# Patient Record
Sex: FEMALE | Race: BLACK OR AFRICAN AMERICAN | NOT HISPANIC OR LATINO | Employment: OTHER | URBAN - METROPOLITAN AREA
[De-identification: names, ages, dates, MRNs, and addresses within clinical notes are randomized per-mention and may not be internally consistent; named-entity substitution may affect disease eponyms.]

---

## 2017-05-18 PROBLEM — M81.0 OSTEOPOROSIS: Status: ACTIVE | Noted: 2017-05-18

## 2017-10-31 PROBLEM — M85.89 OSTEOPENIA OF MULTIPLE SITES: Status: ACTIVE | Noted: 2017-05-18

## 2018-01-08 ENCOUNTER — TELEPHONE (OUTPATIENT)
Dept: ADMINISTRATIVE | Facility: HOSPITAL | Age: 78
End: 2018-01-08

## 2018-03-14 PROBLEM — M05.749 RHEUMATOID ARTHRITIS INVOLVING HAND WITH POSITIVE RHEUMATOID FACTOR: Status: ACTIVE | Noted: 2018-03-14

## 2018-03-14 PROBLEM — M81.8 OTHER OSTEOPOROSIS WITHOUT CURRENT PATHOLOGICAL FRACTURE: Status: ACTIVE | Noted: 2018-03-14

## 2018-03-14 PROBLEM — Z78.9 INTOLERANCE OF ORAL BISPHOSPHONATE THERAPY: Status: ACTIVE | Noted: 2018-03-14

## 2019-01-10 ENCOUNTER — TELEPHONE (OUTPATIENT)
Dept: ADMINISTRATIVE | Facility: HOSPITAL | Age: 79
End: 2019-01-10

## 2019-01-31 PROBLEM — R00.0 TACHYCARDIA: Status: ACTIVE | Noted: 2019-01-31

## 2019-01-31 PROBLEM — I48.3 TYPICAL ATRIAL FLUTTER: Status: ACTIVE | Noted: 2019-01-31

## 2019-03-08 PROBLEM — I48.91 ATRIAL FIBRILLATION WITH RVR: Status: ACTIVE | Noted: 2019-03-08

## 2019-03-08 PROBLEM — I48.92 ATRIAL FLUTTER, PAROXYSMAL: Status: ACTIVE | Noted: 2019-03-08

## 2019-03-08 PROBLEM — I50.9 CHF EXACERBATION: Status: ACTIVE | Noted: 2019-03-08

## 2020-01-07 PROBLEM — D64.9 SYMPTOMATIC ANEMIA: Status: ACTIVE | Noted: 2020-01-07

## 2020-01-07 PROBLEM — I50.30 (HFPEF) HEART FAILURE WITH PRESERVED EJECTION FRACTION: Status: ACTIVE | Noted: 2020-01-07

## 2020-01-08 PROBLEM — D64.9 SYMPTOMATIC ANEMIA: Status: RESOLVED | Noted: 2020-01-07 | Resolved: 2020-01-08

## 2020-01-08 PROBLEM — D64.9 SEVERE ANEMIA: Status: ACTIVE | Noted: 2020-01-08

## 2020-01-09 PROBLEM — D64.9 SYMPTOMATIC ANEMIA: Status: RESOLVED | Noted: 2020-01-07 | Resolved: 2020-01-09

## 2020-01-13 ENCOUNTER — PATIENT OUTREACH (OUTPATIENT)
Dept: ADMINISTRATIVE | Facility: CLINIC | Age: 80
End: 2020-01-13

## 2020-01-13 NOTE — PATIENT INSTRUCTIONS

## 2020-01-13 NOTE — PROGRESS NOTES
C3 nurse attempted to contact patient. No answer. The following message was left for the patient to return the call:  Good morning  I am a nurse calling on behalf of [facility] from the [facility].  This is a Transitional Care Call for Eneida. When you have a moment please contact us at [phone] or [phone] Monday through Friday, between the hours of 8 am to 4 pm. We look forward to speaking with you. On behalf of [facility] have a nice day.    The patient has a scheduled [facility] appointment with Paula Cooper NP on 1/22/20 @ 1300. Message sent to Physician staff.

## 2020-02-26 ENCOUNTER — OFFICE VISIT (OUTPATIENT)
Dept: URGENT CARE | Facility: CLINIC | Age: 80
End: 2020-02-26
Payer: MEDICARE

## 2020-02-26 VITALS
TEMPERATURE: 96 F | HEIGHT: 59 IN | SYSTOLIC BLOOD PRESSURE: 114 MMHG | RESPIRATION RATE: 20 BRPM | OXYGEN SATURATION: 98 % | DIASTOLIC BLOOD PRESSURE: 63 MMHG | BODY MASS INDEX: 24.8 KG/M2 | HEART RATE: 63 BPM | WEIGHT: 123 LBS

## 2020-02-26 DIAGNOSIS — B96.89 ACUTE BACTERIAL SINUSITIS: Primary | ICD-10-CM

## 2020-02-26 DIAGNOSIS — R05.9 COUGH: ICD-10-CM

## 2020-02-26 DIAGNOSIS — J01.90 ACUTE BACTERIAL SINUSITIS: Primary | ICD-10-CM

## 2020-02-26 PROCEDURE — 71046 X-RAY EXAM CHEST 2 VIEWS: CPT | Mod: S$GLB,,, | Performed by: RADIOLOGY

## 2020-02-26 PROCEDURE — 99214 OFFICE O/P EST MOD 30 MIN: CPT | Mod: S$GLB,,, | Performed by: NURSE PRACTITIONER

## 2020-02-26 PROCEDURE — 99214 PR OFFICE/OUTPT VISIT, EST, LEVL IV, 30-39 MIN: ICD-10-PCS | Mod: S$GLB,,, | Performed by: NURSE PRACTITIONER

## 2020-02-26 PROCEDURE — 71046 XR CHEST PA AND LATERAL: ICD-10-PCS | Mod: S$GLB,,, | Performed by: RADIOLOGY

## 2020-02-26 RX ORDER — AMOXICILLIN AND CLAVULANATE POTASSIUM 875; 125 MG/1; MG/1
1 TABLET, FILM COATED ORAL 2 TIMES DAILY
Qty: 20 TABLET | Refills: 0 | Status: SHIPPED | OUTPATIENT
Start: 2020-02-26 | End: 2020-03-07

## 2020-02-26 NOTE — PATIENT INSTRUCTIONS
· Follow up with Dr Cooper in 3-5 days for recheck.  ·   · Seek emergency care any shortness of breath, difficulty breathing, weakness, chest pain or any other concerning or worsening symptoms immediately.  ·   · Continue flonase and montelukast as prescribed per Dr Cooper as well for allergic rhinitis symptoms.    Elevated Blood Pressure  Your blood pressure was elevated during your visit to the urgent care today.  It was not so high that immediate care was needed, but it is recommended that you monitor your blood pressure over the next week or two to make sure that it is not staying elevated.  If you are on blood pressure medication currently, continue as already prescribed. Please have your blood pressure taken 2-3 times daily at different times of the day.  Keep a log of these blood pressure readings and take it with you to see your Primary Care Physician.  Bring today's discharge papers as well to your follow up appointment. If your blood pressure is consistently above 140/90, you should follow-up with your PCP without delay. If you develop chest pain, shortness of breath, dizziness, vision changes, or any other concerning symptoms, you should seek immediate care in the Emergency Department.        1.  Take all antibiotics as prescribed.  It is imperative that once you start them, you take them to completion unless otherwise directed.  You should not have left over antibiotics.     2.  For patients above 6 months of age who are not allergic to and are not on anticoagulants, you can alternate Tylenol and Motrin every 4-6 hours for fever above 100.4F and/or pain.  For patients less than 6 months of age, allergic to or intolerant to NSAIDS, have gastritis, gastric ulcers, or history of GI bleeds, are pregnant, or are on anticoagulant therapy, you can take Tylenol every 4 hours as needed for fever above 100.4F and/or pain.     3.  Rest and keep yourself well hydrated.  Drink hot liquids (coffee, water, tea, hot  chocolate, or soup) 10-12 times a day for 5-7 days.  Put liquid in a mug and place in microwave for 2.5 - 3 minutes. Pour hot liquid into another mug not used to microwave the liquid (to avoid burning your mouth) then sniff the steam from the cup and sip the heated liquid.    4.  You can use these over the counter medications/remedies to help with your symptoms:     Runny Nose:  Use an antihistamine such as Claritin, Zyrtec or Allegra to help dry you out.     Congestion:  Coricidin HBP is okay to use if you have high blood pressure.     Use mucinex (guaifenisin) up to 1200mg/day to break up/loosen any mucous. MucinexDM has a cough suppressant that can be used for cough and at night to stop the tickle in the back of your throat.    Use Nasal Saline to mechanically move any post nasal drip from your eustachian tubes or from the back of your throat.      Use Flonase 1-2 sprays/nostril per day. It is a local acting steroid nasal spray.  If you develop a bloody nose, stop using the medication immediately.    Sore throat:  Use warm, salt water gargles to ease your throat pain- 1/2 tsp salt to 1 cup warm water, gargle as desired.  Chloraseptic sprays and throat lozenges will also help to ease throat pain.           These things will help you to feel better and will speed your recovery.  If your condition fails to improve in a timely manner, you should receive another evaluation by your Primary Care Provider/Pediatrician to discuss your concerns or return to urgent care for a recheck.  If your condition worsens at any time, you should report immediately to your nearest Emergency Department for further evaluation. **You must understand that you have received Urgent Care treatment only and that you may be released before all of your medical problems are known or treated. You, the patient, are responsible to arrange for follow-up care as instructed.     ·   ·   ·   · Follow up with your primary care in 2-5 days if symptoms  have not improved, or you may return here.  · If you were referred to a specialist, please follow up with that specialty.  · If you were prescribed antibiotics, please take them to completion.  · If you were prescribed a narcotic or any medication with sedative effects, do not drive or operate heavy equipment or machinery while taking these medications.  · You must understand that you have received treatment at an Urgent Care facility only, and that you may be released before all of your medical problems are known or treated. Urgent Care facilities are not equipped to handle life threatening emergencies. It is recommended that you go to an Emergency Department for further evaluation of worsening or concerning symptoms, or possibly life threatening conditions as discussed.                                        If you  smoke, please stop smoking

## 2020-02-26 NOTE — PROGRESS NOTES
"Subjective:       Patient ID: Eneida Majano is a 79 y.o. female.    Vitals:  height is 4' 11" (1.499 m) and weight is 55.8 kg (123 lb). Her tympanic temperature is 96 °F (35.6 °C). Her blood pressure is 114/63 and her pulse is 63. Her respiration is 20 and oxygen saturation is 98%.     Chief Complaint: Cough    Pt saw pcp earlier this month for cough and congestion. No relief with flonase, singulair she states "said it was allergies". Pt is notably sneezing at present.     Cough   This is a recurrent problem. The current episode started more than 1 month ago (2 months). The problem has been waxing and waning. The problem occurs constantly. The cough is productive of sputum (clear congestion). Associated symptoms include nasal congestion, postnasal drip and a sore throat. Pertinent negatives include no chest pain, chills, ear congestion, ear pain, fever, headaches, myalgias, rash, shortness of breath or wheezing. Nothing aggravates the symptoms. She has tried OTC cough suppressant (flonase, montelukast) for the symptoms. The treatment provided no relief. There is no history of asthma, bronchitis or COPD.       Constitution: Negative for chills, fatigue and fever.   HENT: Positive for congestion (green), postnasal drip, sinus pressure and sore throat. Negative for ear pain, ear discharge, nosebleeds, trouble swallowing and voice change.    Neck: Negative for neck pain, neck stiffness and painful lymph nodes.   Cardiovascular: Negative for chest pain, leg swelling and sob on exertion.   Eyes: Negative for eye discharge, eye itching, double vision and blurred vision.   Respiratory: Positive for cough and sputum production (clear). Negative for chest tightness, COPD, shortness of breath, wheezing and asthma.    Gastrointestinal: Negative for abdominal pain, nausea, vomiting and diarrhea.   Genitourinary: Negative for dysuria, frequency, urgency, urine decreased and history of kidney stones.   Musculoskeletal: " Negative for joint pain, joint swelling, muscle cramps and muscle ache.   Skin: Negative for color change, pale and rash.   Allergic/Immunologic: Negative for seasonal allergies and asthma.   Neurological: Negative for dizziness, history of vertigo, light-headedness, passing out and headaches.   Hematologic/Lymphatic: Negative for swollen lymph nodes and easy bruising/bleeding. Does not bruise/bleed easily.   Psychiatric/Behavioral: Negative for nervous/anxious, sleep disturbance and depression. The patient is not nervous/anxious.        Objective:      Physical Exam   Constitutional: She is oriented to person, place, and time. She appears well-developed and well-nourished. She is cooperative.  Non-toxic appearance. She does not have a sickly appearance. She does not appear ill. No distress.   HENT:   Head: Normocephalic and atraumatic.   Right Ear: Hearing, tympanic membrane, external ear and ear canal normal. No mastoid tenderness. Tympanic membrane is not erythematous. No middle ear effusion.   Left Ear: Hearing, tympanic membrane, external ear and ear canal normal. No mastoid tenderness. Tympanic membrane is not erythematous.  No middle ear effusion.   Nose: Mucosal edema and purulent discharge present. No rhinorrhea or nasal deformity. No epistaxis. Right sinus exhibits maxillary sinus tenderness. Right sinus exhibits no frontal sinus tenderness. Left sinus exhibits maxillary sinus tenderness. Left sinus exhibits no frontal sinus tenderness.   Mouth/Throat: Uvula is midline and mucous membranes are normal. Mucous membranes are not pale. No trismus in the jaw. Normal dentition. No uvula swelling. Posterior oropharyngeal erythema (mild with purulent post nasal drainage) present. No oropharyngeal exudate, posterior oropharyngeal edema or tonsillar abscesses. Tonsils are 0 on the right. Tonsils are 0 on the left. No tonsillar exudate.   Airway patent, normal phonation. Green streaked congestion on kleenex.    Eyes:  Conjunctivae and lids are normal. Right eye exhibits no discharge. Left eye exhibits no discharge. No scleral icterus.   Neck: Trachea normal, normal range of motion, full passive range of motion without pain and phonation normal. Neck supple. No neck rigidity. No tracheal deviation, no edema and no erythema present.   Cardiovascular: Normal rate, regular rhythm, normal heart sounds, intact distal pulses and normal pulses. PMI is not displaced.   No murmur heard.  Pulmonary/Chest: Effort normal and breath sounds normal. No accessory muscle usage or stridor. No tachypnea and no bradypnea. No respiratory distress. She has no decreased breath sounds. She has no wheezes. She has no rhonchi.   Normal RR and RA sats. Speaking in full sentences without difficulty.    Abdominal: Normal appearance.   Musculoskeletal: Normal range of motion. She exhibits no edema or deformity.   Lymphadenopathy:     She has no cervical adenopathy.   Neurological: She is alert and oriented to person, place, and time. She exhibits normal muscle tone. Coordination normal.   Skin: Skin is warm, dry, intact, not diaphoretic and not pale.   Psychiatric: She has a normal mood and affect. Her speech is normal and behavior is normal. Judgment and thought content normal. Cognition and memory are normal.   Nursing note and vitals reviewed.        Assessment:       1. Acute bacterial sinusitis    2. Cough        Plan:     Alert, nontoxic and in NAD. Afebrile. Pt with sinusitis on exam, she is repeatedly sneezing as well and dose have an allergic rhinitis component to this.  Lungs cta with e/u respirations, no wheezing or seth/sob or evidence respiratory distress. Given age and pmh with length of reported cough, will do chest xray. No acute findings on cxr of decompensation or pneumonia. Will treat for sinusitis and to continue allergic rhinitis as already prescribed by pcp and follow up with pcp in 3-4 days for recheck. Advised on s/s to seek emergency  care, return to clinic. BP elevation, recheck 114/63 when returned for chest xray to clinic.     Acute bacterial sinusitis  -     amoxicillin-clavulanate 875-125mg (AUGMENTIN) 875-125 mg per tablet; Take 1 tablet by mouth 2 (two) times daily. for 10 days  Dispense: 20 tablet; Refill: 0    Cough  -     XR CHEST PA AND LATERAL; Future; Expected date: 02/26/2020        Xr Chest Pa And Lateral    Result Date: 2/26/2020  EXAMINATION: XR CHEST PA AND LATERAL CLINICAL HISTORY: Cough TECHNIQUE: PA and lateral views of the chest were performed. COMPARISON: 01/07/2020.  03/08/2019. FINDINGS: Mediastinal structures are midline. Cardiac silhouette is stable with evidence of left atrial enlargement.  Pulmonary vessels appear large in their central portions with peripheral pruning concerning for pulmonary artery hypertension in this 79-year-old woman. Lungs are deeply and symmetrically inflated without convincing evidence of overinflation.  I detect no pulmonary disease, pleural fluid, lymph node enlargement, cardiac decompensation, pneumothorax, pneumomediastinum, pneumoperitoneum or significant osseous abnormality.     No pneumonia or other source of cough identified. The patient has known left atrial enlargement without cardiac decompensation. I can not exclude pulmonary arterial hypertension. Electronically signed by: Kate Ritter MD Date:    02/26/2020 Time:    15:38        Patient Instructions   · Follow up with Dr Cooper in 3-5 days for recheck.  ·   · Seek emergency care any shortness of breath, difficulty breathing, weakness, chest pain or any other concerning or worsening symptoms immediately.  ·   · Continue flonase and montelukast as prescribed per Dr Cooper as well for allergic rhinitis symptoms.    Elevated Blood Pressure  Your blood pressure was elevated during your visit to the urgent care today.  It was not so high that immediate care was needed, but it is recommended that you monitor your blood pressure over  the next week or two to make sure that it is not staying elevated.  If you are on blood pressure medication currently, continue as already prescribed. Please have your blood pressure taken 2-3 times daily at different times of the day.  Keep a log of these blood pressure readings and take it with you to see your Primary Care Physician.  Bring today's discharge papers as well to your follow up appointment. If your blood pressure is consistently above 140/90, you should follow-up with your PCP without delay. If you develop chest pain, shortness of breath, dizziness, vision changes, or any other concerning symptoms, you should seek immediate care in the Emergency Department.        1.  Take all antibiotics as prescribed.  It is imperative that once you start them, you take them to completion unless otherwise directed.  You should not have left over antibiotics.     2.  For patients above 6 months of age who are not allergic to and are not on anticoagulants, you can alternate Tylenol and Motrin every 4-6 hours for fever above 100.4F and/or pain.  For patients less than 6 months of age, allergic to or intolerant to NSAIDS, have gastritis, gastric ulcers, or history of GI bleeds, are pregnant, or are on anticoagulant therapy, you can take Tylenol every 4 hours as needed for fever above 100.4F and/or pain.     3.  Rest and keep yourself well hydrated.  Drink hot liquids (coffee, water, tea, hot chocolate, or soup) 10-12 times a day for 5-7 days.  Put liquid in a mug and place in microwave for 2.5 - 3 minutes. Pour hot liquid into another mug not used to microwave the liquid (to avoid burning your mouth) then sniff the steam from the cup and sip the heated liquid.    4.  You can use these over the counter medications/remedies to help with your symptoms:     Runny Nose:  Use an antihistamine such as Claritin, Zyrtec or Allegra to help dry you out.     Congestion:  Coricidin HBP is okay to use if you have high blood pressure.      Use mucinex (guaifenisin) up to 1200mg/day to break up/loosen any mucous. MucinexDM has a cough suppressant that can be used for cough and at night to stop the tickle in the back of your throat.    Use Nasal Saline to mechanically move any post nasal drip from your eustachian tubes or from the back of your throat.      Use Flonase 1-2 sprays/nostril per day. It is a local acting steroid nasal spray.  If you develop a bloody nose, stop using the medication immediately.    Sore throat:  Use warm, salt water gargles to ease your throat pain- 1/2 tsp salt to 1 cup warm water, gargle as desired.  Chloraseptic sprays and throat lozenges will also help to ease throat pain.           These things will help you to feel better and will speed your recovery.  If your condition fails to improve in a timely manner, you should receive another evaluation by your Primary Care Provider/Pediatrician to discuss your concerns or return to urgent care for a recheck.  If your condition worsens at any time, you should report immediately to your nearest Emergency Department for further evaluation. **You must understand that you have received Urgent Care treatment only and that you may be released before all of your medical problems are known or treated. You, the patient, are responsible to arrange for follow-up care as instructed.     ·   ·   ·   · Follow up with your primary care in 2-5 days if symptoms have not improved, or you may return here.  · If you were referred to a specialist, please follow up with that specialty.  · If you were prescribed antibiotics, please take them to completion.  · If you were prescribed a narcotic or any medication with sedative effects, do not drive or operate heavy equipment or machinery while taking these medications.  · You must understand that you have received treatment at an Urgent Care facility only, and that you may be released before all of your medical problems are known or treated. Urgent  Care facilities are not equipped to handle life threatening emergencies. It is recommended that you go to an Emergency Department for further evaluation of worsening or concerning symptoms, or possibly life threatening conditions as discussed.                                        If you  smoke, please stop smoking

## 2020-04-06 PROBLEM — R05.3 CHRONIC COUGH: Status: ACTIVE | Noted: 2020-04-06

## 2020-04-06 PROBLEM — J30.2 SEASONAL ALLERGIES: Status: ACTIVE | Noted: 2020-04-06

## 2020-05-22 PROBLEM — R19.5 OCCULT BLOOD POSITIVE STOOL: Status: ACTIVE | Noted: 2020-05-22

## 2020-05-22 PROBLEM — D64.9 SYMPTOMATIC ANEMIA: Status: ACTIVE | Noted: 2020-05-22

## 2020-05-23 PROBLEM — D64.9 SYMPTOMATIC ANEMIA: Status: RESOLVED | Noted: 2020-05-22 | Resolved: 2020-05-23

## 2020-05-23 PROBLEM — R19.5 OCCULT BLOOD POSITIVE STOOL: Status: RESOLVED | Noted: 2020-05-22 | Resolved: 2020-05-23

## 2020-05-28 PROBLEM — D64.9 ANEMIA: Status: ACTIVE | Noted: 2020-05-28

## 2020-06-10 ENCOUNTER — NURSE TRIAGE (OUTPATIENT)
Dept: ADMINISTRATIVE | Facility: CLINIC | Age: 80
End: 2020-06-10

## 2020-06-11 NOTE — TELEPHONE ENCOUNTER
Pt contacted regarding day 14 of [facility].  Pt denies cough, fever or difficulty breathing since procedure.  Instructed pt to call back with further questions or concerns, pt verbalized understanding.    Reason for Disposition   General information question, no triage required and triager able to answer question    Additional Information   Negative: [1] Caller is not with the adult (patient) AND [2] reporting urgent symptoms   Negative: Lab result questions   Negative: Medication questions   Negative: Caller can't be reached by phone   Negative: Caller has already spoken to PCP or another triager   Negative: RN needs further essential information from caller in order to complete triage   Negative: Requesting regular office appointment   Negative: [1] Caller requesting NON-URGENT health information AND [2] PCP's office is the best resource    Protocols used: INFORMATION ONLY CALL-A-
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
## 2020-06-16 PROBLEM — R13.10 DYSPHAGIA: Status: ACTIVE | Noted: 2020-06-16

## 2020-06-16 PROBLEM — T17.320A CHOKING DUE TO FOOD IN LARYNX: Status: ACTIVE | Noted: 2020-06-16

## 2020-07-21 ENCOUNTER — NURSE TRIAGE (OUTPATIENT)
Dept: ADMINISTRATIVE | Facility: CLINIC | Age: 80
End: 2020-07-21

## 2021-03-03 ENCOUNTER — OFFICE VISIT (OUTPATIENT)
Dept: URGENT CARE | Facility: CLINIC | Age: 81
End: 2021-03-03
Payer: MEDICARE

## 2021-03-03 VITALS
WEIGHT: 111 LBS | SYSTOLIC BLOOD PRESSURE: 148 MMHG | BODY MASS INDEX: 22.38 KG/M2 | RESPIRATION RATE: 18 BRPM | OXYGEN SATURATION: 98 % | DIASTOLIC BLOOD PRESSURE: 71 MMHG | HEART RATE: 63 BPM | TEMPERATURE: 98 F | HEIGHT: 59 IN

## 2021-03-03 DIAGNOSIS — R21 RASH: Primary | ICD-10-CM

## 2021-03-03 PROCEDURE — 99213 OFFICE O/P EST LOW 20 MIN: CPT | Mod: S$GLB,,, | Performed by: NURSE PRACTITIONER

## 2021-03-03 PROCEDURE — 3072F PR LOW RISK FOR RETINOPATHY: ICD-10-PCS | Mod: S$GLB,,, | Performed by: NURSE PRACTITIONER

## 2021-03-03 PROCEDURE — 3072F LOW RISK FOR RETINOPATHY: CPT | Mod: S$GLB,,, | Performed by: NURSE PRACTITIONER

## 2021-03-03 PROCEDURE — 99213 PR OFFICE/OUTPT VISIT, EST, LEVL III, 20-29 MIN: ICD-10-PCS | Mod: S$GLB,,, | Performed by: NURSE PRACTITIONER

## 2021-03-03 RX ORDER — PREDNISONE 10 MG/1
10 TABLET ORAL DAILY
Qty: 4 TABLET | Refills: 0 | Status: SHIPPED | OUTPATIENT
Start: 2021-03-03 | End: 2021-03-07

## 2021-03-03 RX ORDER — CETIRIZINE HYDROCHLORIDE 10 MG/1
10 TABLET ORAL DAILY
Qty: 10 TABLET | Refills: 0 | Status: ON HOLD | OUTPATIENT
Start: 2021-03-03 | End: 2021-07-02 | Stop reason: HOSPADM

## 2021-03-03 RX ORDER — TRIAMCINOLONE ACETONIDE 1 MG/G
CREAM TOPICAL 2 TIMES DAILY
Qty: 45 G | Refills: 0 | Status: SHIPPED | OUTPATIENT
Start: 2021-03-03 | End: 2021-05-07 | Stop reason: SDUPTHER

## 2023-02-19 DIAGNOSIS — U07.1 COVID-19 VIRUS DETECTED: ICD-10-CM

## 2023-06-13 ENCOUNTER — OFFICE VISIT (OUTPATIENT)
Dept: URGENT CARE | Facility: CLINIC | Age: 83
End: 2023-06-13
Payer: MEDICARE

## 2023-06-13 VITALS
SYSTOLIC BLOOD PRESSURE: 149 MMHG | OXYGEN SATURATION: 96 % | HEIGHT: 59 IN | TEMPERATURE: 99 F | HEART RATE: 64 BPM | BODY MASS INDEX: 21.97 KG/M2 | DIASTOLIC BLOOD PRESSURE: 75 MMHG | RESPIRATION RATE: 16 BRPM | WEIGHT: 109 LBS

## 2023-06-13 DIAGNOSIS — L03.011 ACUTE PARONYCHIA OF RIGHT THUMB: Primary | ICD-10-CM

## 2023-06-13 PROCEDURE — 10060 I&D ABSCESS SIMPLE/SINGLE: CPT | Mod: S$GLB,,, | Performed by: PHYSICIAN ASSISTANT

## 2023-06-13 PROCEDURE — 99213 PR OFFICE/OUTPT VISIT, EST, LEVL III, 20-29 MIN: ICD-10-PCS | Mod: 25,S$GLB,, | Performed by: PHYSICIAN ASSISTANT

## 2023-06-13 PROCEDURE — 10060 INCISION & DRAINAGE: ICD-10-PCS | Mod: S$GLB,,, | Performed by: PHYSICIAN ASSISTANT

## 2023-06-13 PROCEDURE — 99213 OFFICE O/P EST LOW 20 MIN: CPT | Mod: 25,S$GLB,, | Performed by: PHYSICIAN ASSISTANT

## 2023-06-13 RX ORDER — SULFAMETHOXAZOLE AND TRIMETHOPRIM 400; 80 MG/1; MG/1
1 TABLET ORAL 2 TIMES DAILY
Qty: 20 TABLET | Refills: 0 | Status: SHIPPED | OUTPATIENT
Start: 2023-06-13 | End: 2023-07-13

## 2023-06-13 NOTE — PROCEDURES
"Incision & Drainage    Date/Time: 6/13/2023 11:15 AM  Performed by: Alta Sun PA-C  Authorized by: Alta Sun PA-C     Time out: Immediately prior to procedure a "time out" was called to verify the correct patient, procedure, equipment, support staff and site/side marked as required.    Consent Done?:  Yes (Verbal)    Type:  Abscess (paronychia)  Body area:  Upper extremity  Location details:  Right thumb  Scalpel size:  11  Incision type:  Single straight  Drainage:  Pus  Drainage amount:  Copious  Wound treatment:  Incision, drainage and expression of material  Patient tolerance:  Patient tolerated the procedure well with no immediate complications  "

## 2023-06-13 NOTE — PROGRESS NOTES
"Subjective:      Patient ID: Eneida Majano is a 83 y.o. female.    Vitals:  height is 4' 11" (1.499 m) and weight is 49.4 kg (109 lb). Her oral temperature is 98.9 °F (37.2 °C). Her blood pressure is 149/75 (abnormal) and her pulse is 64. Her respiration is 16 and oxygen saturation is 96%.     Chief Complaint: thumb swelling    Other  This is a new problem. Episode onset: 4 days ago. The problem occurs constantly. The problem has been gradually worsening. Pertinent negatives include no chills, fever, headaches, nausea or vomiting. Associated symptoms comments: Infected right thumb. Exacerbated by: touching it. Treatments tried: alcohol.     Constitution: Negative for chills and fever.   Gastrointestinal:  Negative for nausea and vomiting.   Neurological:  Negative for headaches.    Objective:     Physical Exam   Constitutional: She is oriented to person, place, and time. She appears well-developed. She is cooperative.  Non-toxic appearance. She does not appear ill. No distress.   HENT:   Head: Normocephalic and atraumatic.   Ears:   Right Ear: Hearing normal.   Left Ear: Hearing normal.   Eyes: Conjunctivae and lids are normal. No scleral icterus.   Neck: Phonation normal. Neck supple.   Cardiovascular: Normal rate.   Pulmonary/Chest: Effort normal. No respiratory distress.   Abdominal: Normal appearance.   Musculoskeletal:        Hands:    Neurological: She is alert and oriented to person, place, and time. Coordination normal.   Skin: Skin is intact, not diaphoretic and not pale.   Psychiatric: Her speech is normal and behavior is normal. Judgment and thought content normal.   Nursing note and vitals reviewed.    Assessment:     1. Acute paronychia of right thumb        Plan:       Acute paronychia of right thumb  -     Incision & Drainage  -     sulfamethoxazole-trimethoprim 400-80mg (BACTRIM,SEPTRA) 400-80 mg per tablet; Take 1 tablet by mouth 2 (two) times daily. for 10 days  Dispense: 20 tablet; Refill: " 0      Dosed bactrim lower due to creatine clearance of 24-26 per calculation. Urged to monitor for worsening infection. Return PRN. Discussed with patient the importance of f/u with their primary care provider. Urged to go to the ER for any worsening signs or symptoms.

## 2023-11-28 ENCOUNTER — OFFICE VISIT (OUTPATIENT)
Dept: URGENT CARE | Facility: CLINIC | Age: 83
End: 2023-11-28
Payer: MEDICARE

## 2023-11-28 VITALS
RESPIRATION RATE: 17 BRPM | BODY MASS INDEX: 21.77 KG/M2 | WEIGHT: 108 LBS | HEART RATE: 58 BPM | TEMPERATURE: 97 F | SYSTOLIC BLOOD PRESSURE: 149 MMHG | HEIGHT: 59 IN | DIASTOLIC BLOOD PRESSURE: 72 MMHG | OXYGEN SATURATION: 97 %

## 2023-11-28 DIAGNOSIS — M54.2 NECK PAIN: ICD-10-CM

## 2023-11-28 DIAGNOSIS — R07.9 CHEST PAIN, UNSPECIFIED TYPE: Primary | ICD-10-CM

## 2023-11-28 PROCEDURE — 99214 PR OFFICE/OUTPT VISIT, EST, LEVL IV, 30-39 MIN: ICD-10-PCS | Mod: S$GLB,,,

## 2023-11-28 PROCEDURE — 93005 EKG 12-LEAD: ICD-10-PCS | Mod: S$GLB,,,

## 2023-11-28 PROCEDURE — 93005 ELECTROCARDIOGRAM TRACING: CPT | Mod: S$GLB,,,

## 2023-11-28 PROCEDURE — 99214 OFFICE O/P EST MOD 30 MIN: CPT | Mod: S$GLB,,,

## 2023-11-28 PROCEDURE — 93010 ELECTROCARDIOGRAM REPORT: CPT | Mod: S$GLB,,, | Performed by: INTERNAL MEDICINE

## 2023-11-28 PROCEDURE — 93010 EKG 12-LEAD: ICD-10-PCS | Mod: S$GLB,,, | Performed by: INTERNAL MEDICINE

## 2023-11-28 NOTE — PROGRESS NOTES
"Subjective:      Patient ID: Eneida Majano is a 83 y.o. female.    Vitals:  height is 4' 11" (1.499 m) and weight is 49 kg (108 lb). Her oral temperature is 96.9 °F (36.1 °C). Her blood pressure is 149/72 (abnormal) and her pulse is 58 (abnormal). Her respiration is 17 and oxygen saturation is 97%.     Chief Complaint: Neck Pain and Chest Pain    Patient with PMHx of a.fib, HFpEF, T2DM, RA, CKD stage 3A, chronic neck pain reports to clinic with one week of substernal chest pain that occurs off and on. Pain last 5 mins then resolves. Pt reports nothing makes pain worse and it does not radiate. Pt stated she notices the pain with exertion and sometimes at rest. Denies SOB, fever, dizziness, LOC. Denies chest wall tenderness or injury. Pt reports right neck pain that has been going on for months. Pt denies active CP in clinic.     Neck Pain   This is a new problem. The current episode started more than 1 month ago (6 days). The problem occurs constantly. The problem has been waxing and waning. The pain is associated with a sleep position. The pain is present in the right side and left side. The pain is at a severity of 8/10. The symptoms are aggravated by position and twisting. The pain is Worse during the night. Stiffness is present: denies any stifness. Associated symptoms include chest pain. Pertinent negatives include no headaches, leg pain, numbness or pain with swallowing. Treatments tried: pain cream, tylenol. The treatment provided mild relief.   Chest Pain   This is a new problem. The current episode started in the past 7 days. The problem occurs intermittently. The problem has been waxing and waning. The pain is present in the substernal region. The pain is at a severity of 0/10. The patient is experiencing no pain. The pain does not radiate. Pertinent negatives include no cough, dizziness, headaches, leg pain or numbness. The pain is aggravated by nothing. The treatment provided no relief. Risk factors " include being elderly.   Her past medical history is significant for hypertension.   Pertinent negatives for past medical history include no recent injury and no strokes.   Her family medical history is significant for diabetes.   Pertinent negatives for family medical history include: no heart disease.       Neck: Positive for neck pain.   Cardiovascular:  Positive for chest pain.   Respiratory:  Negative for cough.    Neurological:  Negative for dizziness, headaches and numbness.      Objective:     Physical Exam   Constitutional: She is oriented to person, place, and time. She appears well-developed. She is cooperative.  Non-toxic appearance. She does not appear ill. No distress.   HENT:   Head: Normocephalic and atraumatic.   Ears:   Right Ear: Hearing and external ear normal.   Left Ear: Hearing and external ear normal.   Nose: Nose normal. No mucosal edema, rhinorrhea or nasal deformity. No epistaxis. Right sinus exhibits no maxillary sinus tenderness and no frontal sinus tenderness. Left sinus exhibits no maxillary sinus tenderness and no frontal sinus tenderness.   Mouth/Throat: Uvula is midline, oropharynx is clear and moist and mucous membranes are normal. No trismus in the jaw. Normal dentition. No uvula swelling. No posterior oropharyngeal erythema.   Eyes: Conjunctivae and lids are normal. Right eye exhibits no discharge. Left eye exhibits no discharge. No scleral icterus.   Neck: Trachea normal and phonation normal. Neck supple. No neck rigidity present.   Cardiovascular: Normal rate and regular rhythm.   Pulmonary/Chest: Effort normal and breath sounds normal. No respiratory distress. She has no wheezes.   Abdominal: Normal appearance.   Musculoskeletal: Normal range of motion.         General: No deformity. Normal range of motion.   Neurological: She is alert and oriented to person, place, and time. She displays facial symmetry. She exhibits normal muscle tone. Coordination normal.      Comments:   strength equal bilaterally. Patient is able to move upper and lower extremities. Sensation intact in BUE/BLE.    Skin: Skin is warm, dry, intact, not diaphoretic and not pale.   Psychiatric: Her speech is normal and behavior is normal. Judgment and thought content normal.   Nursing note and vitals reviewed.  ECG- 56bpm sinus silva cardia with 1st degree AV block. NO STEMI.    Vent. Rate : 056 BPM     Atrial Rate : 056 BPM      P-R Int : 214 ms          QRS Dur : 088 ms       QT Int : 426 ms       P-R-T Axes : 087 007 118 degrees      QTc Int : 411 ms     Sinus bradycardia with 1st degree A-V block   Nonspecific T wave abnormality   Abnormal ECG   When compared with ECG of 19-FEB-2023 14:33,   Sinus rhythm has replaced Ectopic atrial rhythm   Vent. rate has decreased BY  32 BPM   Nonspecific T wave abnormality has replaced inverted T waves in Lateral   leads   QT has shortened   Confirmed by ARACELI ESCALANTE MD ([id number]) on 11/28/2023 3:40:12 PM     Referred By:             Confirmed By:ARACELI ESCALANTE MD   Assessment:     1. Chest pain, unspecified type    2. Neck pain        Plan:       Chest pain, unspecified type  -     IN OFFICE EKG 12-LEAD (to Muse)    Neck pain      Medical Decision Making:   History:   Old Medical Records: I decided to obtain old medical records.  Initial Assessment:   Previous cervical spine xray findings: Frontal and lateral views of the cervical spine show anatomic alignment.  Moderate degenerative changes are noted from C5-C7 with anterior and posterior spur formation.  There is loss of joint space at the C6-C7 disc level   Flexion and extension views show no abnormal motion.   Impression:   1.  Moderate degenerative changes in lower cervical spine with no evidence of abnormal motion on flexion or extension views  Other:   I have discussed this case with another health care provider.       <> Summary of the Discussion: Discussed patient's symptoms, history, diagnostics and plan with   [facility] via Collaborating physician chat. Agrees with plan for close outpatient cardiology appointment with ER precautions.   Patient follows with Cardiology outpatient. Patient reports her cardiologist is located at [facility]. Advised patient to have Cardiology follow up in 1-2 days. Go to ER for worsening symptoms.      Patient is AAO and NAD. Patient denies active chest pains in clinic. Discussed ECG results with patient in clinic and discussed previous cervical xray images from 5/18/23. Pt verbalized understanding and stated he will have cardiology evaluation outpatient. Advised to follow up with Paula Cooper NP if neck pain continues. Tylenol for pain every 6 hours.     Discussed with patient the importance of f/u with their primary care provider. Urged to go to the ER for any worsening signs or symptoms.

## 2023-11-28 NOTE — PATIENT INSTRUCTIONS
You must understand that you have received treatment at an Urgent Care facility only, and that you may be  released before all of your medical problems are known or treated. Urgent Care facilities are not equipped to  handle life threatening emergencies. It is recommended that you seek care at an Emergency Department for  further evaluation of worsening or concerning symptoms, or possibly life threatening conditions as  discussed.    Close follow up with Cardiology in 1-2 days. Go to ER for worsening symptoms or if symptoms continue. Tylenol for neck pain and rest. Follow up with Primary Care Doctor for cervical z-ray follow up and if symptoms continue.

## 2024-01-26 ENCOUNTER — OFFICE VISIT (OUTPATIENT)
Dept: URGENT CARE | Facility: CLINIC | Age: 84
End: 2024-01-26
Payer: MEDICARE

## 2024-01-26 VITALS
OXYGEN SATURATION: 95 % | WEIGHT: 108 LBS | HEART RATE: 95 BPM | BODY MASS INDEX: 21.77 KG/M2 | DIASTOLIC BLOOD PRESSURE: 67 MMHG | SYSTOLIC BLOOD PRESSURE: 114 MMHG | HEIGHT: 59 IN | RESPIRATION RATE: 18 BRPM | TEMPERATURE: 99 F

## 2024-01-26 DIAGNOSIS — N39.0 URINARY TRACT INFECTION WITHOUT HEMATURIA, SITE UNSPECIFIED: ICD-10-CM

## 2024-01-26 DIAGNOSIS — R35.0 FREQUENCY OF URINATION: Primary | ICD-10-CM

## 2024-01-26 LAB
BILIRUB UR QL STRIP: POSITIVE
GLUCOSE UR QL STRIP: NEGATIVE
KETONES UR QL STRIP: NEGATIVE
LEUKOCYTE ESTERASE UR QL STRIP: NEGATIVE
PH, POC UA: 6.5 (ref 5–8)
POC BLOOD, URINE: NEGATIVE
POC NITRATES, URINE: NEGATIVE
PROT UR QL STRIP: NEGATIVE
SP GR UR STRIP: 1.01 (ref 1–1.03)
UROBILINOGEN UR STRIP-ACNC: NORMAL (ref 0.1–1.1)

## 2024-01-26 PROCEDURE — 99214 OFFICE O/P EST MOD 30 MIN: CPT | Mod: S$GLB,,, | Performed by: FAMILY MEDICINE

## 2024-01-26 PROCEDURE — 81003 URINALYSIS AUTO W/O SCOPE: CPT | Mod: QW,S$GLB,, | Performed by: FAMILY MEDICINE

## 2024-01-26 RX ORDER — PHENAZOPYRIDINE HYDROCHLORIDE 200 MG/1
200 TABLET, FILM COATED ORAL 3 TIMES DAILY PRN
Qty: 12 TABLET | Refills: 0 | Status: SHIPPED | OUTPATIENT
Start: 2024-01-26 | End: 2025-01-25

## 2024-01-26 RX ORDER — SULFAMETHOXAZOLE AND TRIMETHOPRIM 800; 160 MG/1; MG/1
1 TABLET ORAL 2 TIMES DAILY
Qty: 20 TABLET | Refills: 0 | Status: SHIPPED | OUTPATIENT
Start: 2024-01-26

## 2024-01-26 NOTE — PROGRESS NOTES
"Subjective:      Patient ID: Eneida Majano is a 83 y.o. female.    Vitals:  height is 4' 11" (1.499 m) and weight is 49 kg (108 lb). Her oral temperature is 98.7 °F (37.1 °C). Her blood pressure is 114/67 and her pulse is 95. Her respiration is 18 and oxygen saturation is 95%.     Chief Complaint: Dysuria    Pt is coming in for frequency when urinating that began this morning. Pt states that she also is having blood from her behind when she wipes that has been going on for about a month.    Dysuria   This is a new problem. The current episode started today. The problem occurs every urination. The problem has been unchanged. Quality: no pain. The pain is at a severity of 0/10. The patient is experiencing no pain. There has been no fever. Associated symptoms include frequency and urgency. Pertinent negatives include no hematuria. She has tried nothing for the symptoms. The treatment provided no relief. Her past medical history is significant for hypertension. There is no history of kidney stones or recurrent UTIs.       Constitution: Negative.   HENT: Negative.     Cardiovascular: Negative.    Eyes: Negative.    Respiratory: Negative.     Gastrointestinal: Negative.    Endocrine: negative.   Genitourinary:  Positive for dysuria, frequency and urgency. Negative for hematuria.   Musculoskeletal: Negative.    Skin: Negative.    Allergic/Immunologic: Negative.    Neurological: Negative.    Hematologic/Lymphatic: Negative.    Psychiatric/Behavioral: Negative.        Objective:     Physical Exam   Constitutional: She is oriented to person, place, and time. She appears well-developed.   HENT:   Head: Normocephalic and atraumatic.   Ears:   Right Ear: External ear normal.   Left Ear: External ear normal.   Nose: Nose normal. No nasal deformity. No epistaxis.   Mouth/Throat: Oropharynx is clear and moist and mucous membranes are normal.   Eyes: Lids are normal.   Neck: Trachea normal and phonation normal. Neck supple. "   Cardiovascular: Normal pulses.   Pulmonary/Chest: Effort normal.   Abdominal: Normal appearance and bowel sounds are normal. She exhibits no distension. Soft. There is abdominal tenderness in the suprapubic area. There is no rigidity, no rebound, no guarding, no tenderness at McBurney's point and negative Sandoval's sign.   Neurological: She is alert and oriented to person, place, and time.   Skin: Skin is warm, dry and intact.   Psychiatric: Her speech is normal and behavior is normal.   Nursing note and vitals reviewed.    Results for orders placed or performed in visit on 01/26/24   POCT Urinalysis, Dipstick, Automated, W/O Scope   Result Value Ref Range    POC Blood, Urine Negative Negative    POC Bilirubin, Urine Positive (A) Negative    POC Urobilinogen, Urine normal 0.1 - 1.1    POC Ketones, Urine Negative Negative    POC Protein, Urine Negative Negative    POC Nitrates, Urine Negative Negative    POC Glucose, Urine Negative Negative    pH, UA 6.5 5 - 8    POC Specific Gravity, Urine 1.015 1.003 - 1.029    POC Leukocytes, Urine Negative Negative         Assessment:     1. Frequency of urination    2. Urinary tract infection without hematuria, site unspecified        Plan:       Frequency of urination  -     POCT Urinalysis, Dipstick, Automated, W/O Scope    Urinary tract infection without hematuria, site unspecified  -     sulfamethoxazole-trimethoprim 800-160mg (BACTRIM DS) 800-160 mg Tab; Take 1 tablet by mouth 2 (two) times daily.  Dispense: 20 tablet; Refill: 0  -     phenazopyridine (PYRIDIUM) 200 MG tablet; Take 1 tablet (200 mg total) by mouth 3 (three) times daily as needed for Pain.  Dispense: 12 tablet; Refill: 0      Please return here or go to the Emergency Department for any concerns or worsening of condition.  If you were prescribed antibiotics, please take them to completion.  If you were prescribed a narcotic medication, do not drive or operate heavy equipment or machinery while taking these  medications.  Please follow up with your primary care doctor or specialist as needed.  Please drink plenty of fluids.  Please get plenty of rest.  If you were prescribed Pyridium (phenazopyridine), please be aware that if you wear contact lens that this medication may stain your contacts.  While taking this medication it is recommended that you do not wear your contacts until 24 hours after your last dose.    Push fluids aggressively to improve symptoms and wash through the infection.  Cranberry juice can help relieve symptoms  If you  smoke, please stop smoking.    Please follow up with your primary care doctor or specialist as needed.    Paula Cooper NP  [phone]    You must understand that you have received treatment at an Urgent Care facility only, and that you may be  released before all of your medical problems are known or treated. Urgent Care facilities are not equipped to  handle life threatening emergencies. It is recommended that you seek care at an Emergency Department for  further evaluation of worsening or concerning symptoms, or possibly life threatening conditions as  discussed.

## 2024-01-26 NOTE — LETTER
January 26, 2024  Eneida Majano  [address] [facility] [address]                [facility]  [address] [place] [address] A  [facility] [address]  Phone: [phone]  Fax: [phone] Eneida Majano was seen and treated in our Urgent Care department on 1/26/2024. She may return to work in 2 - 3 days.      If you have any questions or concerns, please don't hesitate to call.        Sincerely,        Dex Pablo MD

## 2024-01-26 NOTE — PATIENT INSTRUCTIONS
Please return here or go to the Emergency Department for any concerns or worsening of condition.  If you were prescribed antibiotics, please take them to completion.  If you were prescribed a narcotic medication, do not drive or operate heavy equipment or machinery while taking these medications.  Please follow up with your primary care doctor or specialist as needed.  Please drink plenty of fluids.  Please get plenty of rest.  If you were prescribed Pyridium (phenazopyridine), please be aware that if you wear contact lens that this medication may stain your contacts.  While taking this medication it is recommended that you do not wear your contacts until 24 hours after your last dose.    Push fluids aggressively to improve symptoms and wash through the infection.  Cranberry juice can help relieve symptoms  If you  smoke, please stop smoking.    Please follow up with your primary care doctor or specialist as needed.    Paula Cooper NP  [phone]    You must understand that you have received treatment at an Urgent Care facility only, and that you may be  released before all of your medical problems are known or treated. Urgent Care facilities are not equipped to  handle life threatening emergencies. It is recommended that you seek care at an Emergency Department for  further evaluation of worsening or concerning symptoms, or possibly life threatening conditions as  discussed.    Bladder Infection, Female (Adult)    Urine is normally doesn't have any bacteria in it. But bacteria can get into the urinary tract from the skin around the rectum. Or they can travel in the blood from elsewhere in the body. Once they are in your urinary tract, they can cause infection in the urethra (urethritis), the bladder (cystitis), or the kidneys (pyelonephritis).  The most common place for an infection is in the bladder. This is called a bladder infection. This is one of the most common infections in women. Most bladder  "infections are easily treated. They are not serious unless the infection spreads to the kidney.  The phrases "bladder infection," "UTI," and "cystitis" are often used to describe the same thing. But they are not always the same. Cystitis is an inflammation of the bladder. The most common cause of cystitis is an infection.  Symptoms  The infection causes inflammation in the urethra and bladder. This causes many of the symptoms. The most common symptoms of a bladder infection are:  Pain or burning when urinating  Having to urinate more often than usual  Urgent need to urinate  Only a small amount of urine comes out  Blood in urine  Abdominal discomfort. This is usually in the lower abdomen above the pubic bone.  Cloudy urine  Strong- or bad-smelling urine  Unable to urinate (urinary retention)  Unable to hold urine in (urinary incontinence)  Fever  Loss of appetite  Confusion (in older adults)  Causes  Bladder infections are not contagious. You can't get one from someone else, from a toilet seat, or from sharing a bath.  The most common cause of bladder infections is bacteria from the bowels. The bacteria get onto the skin around the opening of the urethra. From there, they can get into the urine and travel up to the bladder, causing inflammation and infection. This usually happens because of:  Wiping improperly after urinating. Always wipe from front to back.  Bowel incontinence  Pregnancy  Procedures such as having a catheter inserted  Older age  Not emptying your bladder. This can allow bacteria a chance to grow in your urine.  Dehydration  Constipation  Sex  Use of a diaphragm for birth control   Treatment  Bladder infections are diagnosed by a urine test. They are treated with antibiotics and usually clear up quickly without complications. Treatment helps prevent a more serious kidney infection.  Medicines  Medicines can help in the treatment of a bladder infection:  Take antibiotics until they are used up, even " if you feel better. It is important to finish them to make sure the infection has cleared.  You can use acetaminophen or ibuprofen for pain, fever, or discomfort, unless another medicine was prescribed. If you have chronic liver or kidney disease, talk with your healthcare provider before using these medicines. Also talk with your provider if you've ever had a stomach ulcer or gastrointestinal bleeding, or are taking blood-thinner medicines.  If you are given phenazopydridine to reduce burning with urination, it will cause your urine to become a bright orange color. This can stain clothing.  Care and prevention  These self-care steps can help prevent future infections:  Drink plenty of fluids to prevent dehydration and flush out your bladder. Do this unless you must restrict fluids for other health reasons, or your doctor told you not to.  Proper cleaning after going to the bathroom is important. Wipe from front to back after using the toilet to prevent the spread of bacteria.  Urinate more often. Don't try to hold urine in for a long time.  Wear loose-fitting clothes and cotton underwear. Avoid tight-fitting pants.  Improve your diet and prevent constipation. Eat more fresh fruit and vegetables, and fiber, and less junk and fatty foods.  Avoid sex until your symptoms are gone.  Avoid caffeine, alcohol, and spicy foods. These can irritate your bladder.  Urinate right after intercourse to flush out your bladder.  If you use birth control pills and have frequent bladder infections, discuss it with your doctor.  Follow-up care  Call your healthcare provider if all symptoms are not gone after 3 days of treatment. This is especially important if you have repeat infections.  If a culture was done, you will be told if your treatment needs to be changed. If directed, you can call to find out the results.  If X-rays were done, you will be told if the results will affect your treatment.  Call 911  Call 911 if any of the  following occur:  Trouble breathing  Hard to wake up or confusion  Fainting or loss of consciousness  Rapid heart rate  When to seek medical advice  Call your healthcare provider right away if any of these occur:  Fever of 100.4ºF (38.0ºC) or higher, or as directed by your healthcare provider  Symptoms are not better by the third day of treatment  Back or belly (abdominal) pain that gets worse  Repeated vomiting, or unable to keep medicine down  Weakness or dizziness  Vaginal discharge  Pain, redness, or swelling in the outer vaginal area (labia)  Date Last Reviewed: 10/1/2016  © 5119-4940 [employer]. [address] [place] [address]. All rights reserved. This information is not intended as a substitute for professional medical care. Always follow your healthcare professional's instructions.

## 2024-10-28 PROBLEM — R55 NEAR SYNCOPE: Status: ACTIVE | Noted: 2024-10-28

## 2024-10-28 PROBLEM — R42 LIGHTHEADEDNESS: Status: ACTIVE | Noted: 2024-10-28

## 2024-10-28 PROBLEM — I48.92 ATRIAL FLUTTER: Status: ACTIVE | Noted: 2024-10-28

## 2024-10-28 PROBLEM — N17.9 AKI (ACUTE KIDNEY INJURY): Status: ACTIVE | Noted: 2024-10-28

## 2024-10-28 PROBLEM — M06.9 RHEUMATOID ARTHRITIS: Status: ACTIVE | Noted: 2018-03-14

## 2024-10-29 PROBLEM — N17.9 AKI (ACUTE KIDNEY INJURY): Status: RESOLVED | Noted: 2024-10-28 | Resolved: 2024-10-29

## 2024-10-29 PROBLEM — R55 NEAR SYNCOPE: Status: RESOLVED | Noted: 2024-10-28 | Resolved: 2024-10-29

## 2024-12-17 PROBLEM — R91.1 PULMONARY NODULE: Status: ACTIVE | Noted: 2024-12-17

## 2024-12-23 ENCOUNTER — PATIENT MESSAGE (OUTPATIENT)
Dept: ADMINISTRATIVE | Facility: HOSPITAL | Age: 84
End: 2024-12-23
Payer: MEDICARE

## 2025-01-28 PROBLEM — I63.511 STROKE DUE TO OCCLUSION OF RIGHT MIDDLE CEREBRAL ARTERY: Status: ACTIVE | Noted: 2025-01-28

## 2025-01-29 PROBLEM — R17 SERUM TOTAL BILIRUBIN ELEVATED: Status: ACTIVE | Noted: 2025-01-29

## 2025-01-29 PROBLEM — I63.411 CEREBROVASCULAR ACCIDENT (CVA) DUE TO EMBOLISM OF RIGHT MIDDLE CEREBRAL ARTERY: Status: ACTIVE | Noted: 2025-01-29

## 2025-01-29 PROBLEM — R79.89 ELEVATED TROPONIN LEVEL NOT DUE TO ACUTE CORONARY SYNDROME: Status: ACTIVE | Noted: 2025-01-29

## 2025-01-29 PROBLEM — G93.40 ACUTE ENCEPHALOPATHY: Status: ACTIVE | Noted: 2025-01-29

## 2025-01-30 PROBLEM — I63.412 EMBOLIC STROKE INVOLVING LEFT MIDDLE CEREBRAL ARTERY: Status: ACTIVE | Noted: 2025-01-30

## 2025-02-01 PROBLEM — I50.9 ACUTE ON CHRONIC HEART FAILURE: Status: ACTIVE | Noted: 2025-02-01

## 2025-02-03 ENCOUNTER — HOSPITAL ENCOUNTER (INPATIENT)
Facility: HOSPITAL | Age: 85
LOS: 9 days | Discharge: ANOTHER HOSPITAL | DRG: 064 | End: 2025-02-12
Payer: MEDICARE

## 2025-02-03 DIAGNOSIS — I63.411 CEREBROVASCULAR ACCIDENT (CVA) DUE TO EMBOLISM OF RIGHT MIDDLE CEREBRAL ARTERY: ICD-10-CM

## 2025-02-03 DIAGNOSIS — G93.40 ACUTE ENCEPHALOPATHY: ICD-10-CM

## 2025-02-03 DIAGNOSIS — I48.91 ATRIAL FIBRILLATION WITH RVR: ICD-10-CM

## 2025-02-03 DIAGNOSIS — I63.412 EMBOLIC STROKE INVOLVING LEFT MIDDLE CEREBRAL ARTERY: Primary | ICD-10-CM

## 2025-02-03 LAB
ALBUMIN SERPL BCP-MCNC: 3.2 G/DL (ref 3.5–5.2)
ALLENS TEST: ABNORMAL
ALP SERPL-CCNC: 57 U/L (ref 40–150)
ALT SERPL W/O P-5'-P-CCNC: 13 U/L (ref 10–44)
AMMONIA PLAS-SCNC: 28 UMOL/L (ref 10–50)
AMYLASE SERPL-CCNC: 33 U/L (ref 20–110)
ANION GAP SERPL CALC-SCNC: 13 MMOL/L (ref 8–16)
APTT PPP: 26.5 SEC (ref 21–32)
AST SERPL-CCNC: 26 U/L (ref 10–40)
BASOPHILS # BLD AUTO: 0.03 K/UL (ref 0–0.2)
BASOPHILS NFR BLD: 0.4 % (ref 0–1.9)
BILIRUB SERPL-MCNC: 1.3 MG/DL (ref 0.1–1)
BNP SERPL-MCNC: 949 PG/ML (ref 0–99)
BUN SERPL-MCNC: 16 MG/DL (ref 8–23)
CA-I BLDV-SCNC: 1.12 MMOL/L (ref 1.06–1.42)
CALCIUM SERPL-MCNC: 9.3 MG/DL (ref 8.7–10.5)
CHLORIDE SERPL-SCNC: 107 MMOL/L (ref 95–110)
CO2 SERPL-SCNC: 20 MMOL/L (ref 23–29)
CREAT SERPL-MCNC: 1.1 MG/DL (ref 0.5–1.4)
DIFFERENTIAL METHOD BLD: ABNORMAL
DIGOXIN SERPL-MCNC: <0.1 NG/ML (ref 0.8–2)
EOSINOPHIL # BLD AUTO: 0.1 K/UL (ref 0–0.5)
EOSINOPHIL NFR BLD: 0.6 % (ref 0–8)
ERYTHROCYTE [DISTWIDTH] IN BLOOD BY AUTOMATED COUNT: 15.5 % (ref 11.5–14.5)
ERYTHROCYTE [DISTWIDTH] IN BLOOD BY AUTOMATED COUNT: 15.5 % (ref 11.5–14.5)
EST. GFR  (NO RACE VARIABLE): 50 ML/MIN/1.73 M^2
FIO2: 21 %
FIO2: 21 %
GLUCOSE SERPL-MCNC: 108 MG/DL (ref 70–110)
HCT VFR BLD AUTO: 37.4 % (ref 37–48.5)
HCT VFR BLD AUTO: 37.4 % (ref 37–48.5)
HGB BLD-MCNC: 12 G/DL (ref 12–16)
HGB BLD-MCNC: 12 G/DL (ref 12–16)
IMM GRANULOCYTES # BLD AUTO: 0.02 K/UL (ref 0–0.04)
IMM GRANULOCYTES NFR BLD AUTO: 0.2 % (ref 0–0.5)
INR PPP: 1.2 (ref 0.8–1.2)
LACTATE SERPL-SCNC: 1.7 MMOL/L (ref 0.5–2.2)
LIPASE SERPL-CCNC: 14 U/L (ref 4–60)
LYMPHOCYTES # BLD AUTO: 1.4 K/UL (ref 1–4.8)
LYMPHOCYTES NFR BLD: 16.1 % (ref 18–48)
MCH RBC QN AUTO: 28.7 PG (ref 27–31)
MCH RBC QN AUTO: 28.7 PG (ref 27–31)
MCHC RBC AUTO-ENTMCNC: 32.1 G/DL (ref 32–36)
MCHC RBC AUTO-ENTMCNC: 32.1 G/DL (ref 32–36)
MCV RBC AUTO: 90 FL (ref 82–98)
MCV RBC AUTO: 90 FL (ref 82–98)
MONOCYTES # BLD AUTO: 0.6 K/UL (ref 0.3–1)
MONOCYTES NFR BLD: 6.8 % (ref 4–15)
NEUTROPHILS # BLD AUTO: 6.5 K/UL (ref 1.8–7.7)
NEUTROPHILS NFR BLD: 75.9 % (ref 38–73)
NRBC BLD-RTO: 0 /100 WBC
PCO2 BLDA: 18.2 MMHG (ref 35–45)
PCO2 BLDA: 36.8 MMHG (ref 35–45)
PH SMN: 7.41 [PH] (ref 7.35–7.45)
PH SMN: 7.58 [PH] (ref 7.35–7.45)
PLATELET # BLD AUTO: 253 K/UL (ref 150–450)
PLATELET # BLD AUTO: 253 K/UL (ref 150–450)
PMV BLD AUTO: 10.3 FL (ref 9.2–12.9)
PMV BLD AUTO: 10.3 FL (ref 9.2–12.9)
PO2 BLDA: 161 MMHG (ref 80–100)
PO2 BLDA: 30.3 MMHG (ref 40–60)
POC BASE DEFICIT: -0.8 MMOL/L (ref -2–2)
POC BASE DEFICIT: -2.2 MMOL/L (ref -2–2)
POC HCO3: 17.1 MMOL/L (ref 24–28)
POC HCO3: 23.5 MMOL/L (ref 24–28)
POC PERFORMED BY: ABNORMAL
POC PERFORMED BY: ABNORMAL
POC SATURATED O2: 54 % (ref 95–100)
POC SATURATED O2: 98.8 % (ref 95–100)
POCT GLUCOSE: 89 MG/DL (ref 70–110)
POTASSIUM SERPL-SCNC: 4.6 MMOL/L (ref 3.5–5.1)
PROT SERPL-MCNC: 6.8 G/DL (ref 6–8.4)
PROTHROMBIN TIME: 13.1 SEC (ref 9–12.5)
RBC # BLD AUTO: 4.18 M/UL (ref 4–5.4)
RBC # BLD AUTO: 4.18 M/UL (ref 4–5.4)
SODIUM SERPL-SCNC: 140 MMOL/L (ref 136–145)
SPECIMEN SOURCE: ABNORMAL
SPECIMEN SOURCE: ABNORMAL
T4 FREE SERPL-MCNC: 1.16 NG/DL (ref 0.71–1.51)
TROPONIN I SERPL DL<=0.01 NG/ML-MCNC: 0.22 NG/ML (ref 0–0.03)
TSH SERPL DL<=0.005 MIU/L-ACNC: 3.16 UIU/ML (ref 0.4–4)
WBC # BLD AUTO: 8.5 K/UL (ref 3.9–12.7)
WBC # BLD AUTO: 8.5 K/UL (ref 3.9–12.7)

## 2025-02-03 PROCEDURE — 84443 ASSAY THYROID STIM HORMONE: CPT

## 2025-02-03 PROCEDURE — 63600175 PHARM REV CODE 636 W HCPCS: Performed by: STUDENT IN AN ORGANIZED HEALTH CARE EDUCATION/TRAINING PROGRAM

## 2025-02-03 PROCEDURE — 51798 US URINE CAPACITY MEASURE: CPT

## 2025-02-03 PROCEDURE — 82140 ASSAY OF AMMONIA: CPT

## 2025-02-03 PROCEDURE — 94761 N-INVAS EAR/PLS OXIMETRY MLT: CPT | Mod: XB

## 2025-02-03 PROCEDURE — 80053 COMPREHEN METABOLIC PANEL: CPT

## 2025-02-03 PROCEDURE — 82803 BLOOD GASES ANY COMBINATION: CPT

## 2025-02-03 PROCEDURE — 83880 ASSAY OF NATRIURETIC PEPTIDE: CPT

## 2025-02-03 PROCEDURE — 36410 VNPNXR 3YR/> PHY/QHP DX/THER: CPT

## 2025-02-03 PROCEDURE — 85730 THROMBOPLASTIN TIME PARTIAL: CPT

## 2025-02-03 PROCEDURE — 83690 ASSAY OF LIPASE: CPT

## 2025-02-03 PROCEDURE — 20000000 HC ICU ROOM

## 2025-02-03 PROCEDURE — 36415 COLL VENOUS BLD VENIPUNCTURE: CPT

## 2025-02-03 PROCEDURE — 84484 ASSAY OF TROPONIN QUANT: CPT

## 2025-02-03 PROCEDURE — 82330 ASSAY OF CALCIUM: CPT

## 2025-02-03 PROCEDURE — 84439 ASSAY OF FREE THYROXINE: CPT

## 2025-02-03 PROCEDURE — 85610 PROTHROMBIN TIME: CPT

## 2025-02-03 PROCEDURE — 36415 COLL VENOUS BLD VENIPUNCTURE: CPT | Performed by: NURSE PRACTITIONER

## 2025-02-03 PROCEDURE — 63600175 PHARM REV CODE 636 W HCPCS: Performed by: NURSE PRACTITIONER

## 2025-02-03 PROCEDURE — C1751 CATH, INF, PER/CENT/MIDLINE: HCPCS

## 2025-02-03 PROCEDURE — 87040 BLOOD CULTURE FOR BACTERIA: CPT | Mod: 59

## 2025-02-03 PROCEDURE — 83605 ASSAY OF LACTIC ACID: CPT

## 2025-02-03 PROCEDURE — 27000221 HC OXYGEN, UP TO 24 HOURS

## 2025-02-03 PROCEDURE — 80162 ASSAY OF DIGOXIN TOTAL: CPT | Performed by: NURSE PRACTITIONER

## 2025-02-03 PROCEDURE — 25000003 PHARM REV CODE 250: Performed by: INTERNAL MEDICINE

## 2025-02-03 PROCEDURE — 63600175 PHARM REV CODE 636 W HCPCS

## 2025-02-03 PROCEDURE — 63600175 PHARM REV CODE 636 W HCPCS: Performed by: INTERNAL MEDICINE

## 2025-02-03 PROCEDURE — 82150 ASSAY OF AMYLASE: CPT

## 2025-02-03 PROCEDURE — 25000003 PHARM REV CODE 250

## 2025-02-03 PROCEDURE — 99900035 HC TECH TIME PER 15 MIN (STAT)

## 2025-02-03 PROCEDURE — 36600 WITHDRAWAL OF ARTERIAL BLOOD: CPT

## 2025-02-03 PROCEDURE — 85025 COMPLETE CBC W/AUTO DIFF WBC: CPT | Mod: 91

## 2025-02-03 RX ORDER — GLUCAGON 1 MG
1 KIT INJECTION
Status: DISCONTINUED | OUTPATIENT
Start: 2025-02-03 | End: 2025-02-12 | Stop reason: HOSPADM

## 2025-02-03 RX ORDER — CALCIUM GLUCONATE 20 MG/ML
1 INJECTION, SOLUTION INTRAVENOUS
Status: DISCONTINUED | OUTPATIENT
Start: 2025-02-03 | End: 2025-02-12 | Stop reason: HOSPADM

## 2025-02-03 RX ORDER — HALOPERIDOL 5 MG/ML
1 INJECTION INTRAMUSCULAR EVERY 6 HOURS PRN
Status: DISCONTINUED | OUTPATIENT
Start: 2025-02-03 | End: 2025-02-12 | Stop reason: HOSPADM

## 2025-02-03 RX ORDER — FAMOTIDINE 10 MG/ML
20 INJECTION INTRAVENOUS EVERY 12 HOURS
Status: DISCONTINUED | OUTPATIENT
Start: 2025-02-03 | End: 2025-02-03 | Stop reason: DRUGHIGH

## 2025-02-03 RX ORDER — ELECTROLYTES/DEXTROSE
200 SOLUTION, ORAL ORAL
Status: DISCONTINUED | OUTPATIENT
Start: 2025-02-03 | End: 2025-02-12

## 2025-02-03 RX ORDER — LABETALOL HYDROCHLORIDE 5 MG/ML
10 INJECTION, SOLUTION INTRAVENOUS EVERY 6 HOURS PRN
Status: DISCONTINUED | OUTPATIENT
Start: 2025-02-03 | End: 2025-02-03

## 2025-02-03 RX ORDER — CALCIUM GLUCONATE 20 MG/ML
2 INJECTION, SOLUTION INTRAVENOUS
Status: DISCONTINUED | OUTPATIENT
Start: 2025-02-03 | End: 2025-02-12 | Stop reason: HOSPADM

## 2025-02-03 RX ORDER — DIGOXIN 0.25 MG/ML
125 INJECTION INTRAMUSCULAR; INTRAVENOUS ONCE
Status: COMPLETED | OUTPATIENT
Start: 2025-02-03 | End: 2025-02-03

## 2025-02-03 RX ORDER — HYDROXYZINE PAMOATE 25 MG/1
25 CAPSULE ORAL EVERY 6 HOURS PRN
Status: DISCONTINUED | OUTPATIENT
Start: 2025-02-03 | End: 2025-02-10

## 2025-02-03 RX ORDER — CLOPIDOGREL BISULFATE 75 MG/1
75 TABLET ORAL DAILY
Status: DISCONTINUED | OUTPATIENT
Start: 2025-02-04 | End: 2025-02-03

## 2025-02-03 RX ORDER — ATORVASTATIN CALCIUM 40 MG/1
80 TABLET, FILM COATED ORAL DAILY
Status: DISCONTINUED | OUTPATIENT
Start: 2025-02-03 | End: 2025-02-10

## 2025-02-03 RX ORDER — ACETAMINOPHEN 325 MG/1
650 TABLET ORAL EVERY 4 HOURS PRN
Status: DISCONTINUED | OUTPATIENT
Start: 2025-02-03 | End: 2025-02-03

## 2025-02-03 RX ORDER — MAGNESIUM SULFATE HEPTAHYDRATE 40 MG/ML
2 INJECTION, SOLUTION INTRAVENOUS
Status: DISCONTINUED | OUTPATIENT
Start: 2025-02-03 | End: 2025-02-12 | Stop reason: HOSPADM

## 2025-02-03 RX ORDER — ACETAMINOPHEN 650 MG/1
650 SUPPOSITORY RECTAL EVERY 6 HOURS PRN
Status: DISCONTINUED | OUTPATIENT
Start: 2025-02-03 | End: 2025-02-08

## 2025-02-03 RX ORDER — SODIUM CHLORIDE 0.9 % (FLUSH) 0.9 %
10 SYRINGE (ML) INJECTION
Status: DISCONTINUED | OUTPATIENT
Start: 2025-02-03 | End: 2025-02-12 | Stop reason: HOSPADM

## 2025-02-03 RX ORDER — METOPROLOL TARTRATE 50 MG/1
50 TABLET ORAL EVERY 6 HOURS
Status: DISCONTINUED | OUTPATIENT
Start: 2025-02-04 | End: 2025-02-06

## 2025-02-03 RX ORDER — ESMOLOL HYDROCHLORIDE 20 MG/ML
0-300 INJECTION, SOLUTION INTRAVENOUS CONTINUOUS
Status: DISCONTINUED | OUTPATIENT
Start: 2025-02-03 | End: 2025-02-06

## 2025-02-03 RX ORDER — METOPROLOL TARTRATE 50 MG/1
50 TABLET ORAL EVERY 6 HOURS
Status: DISCONTINUED | OUTPATIENT
Start: 2025-02-03 | End: 2025-02-03

## 2025-02-03 RX ORDER — INSULIN ASPART 100 [IU]/ML
0-5 INJECTION, SOLUTION INTRAVENOUS; SUBCUTANEOUS EVERY 6 HOURS PRN
Status: DISCONTINUED | OUTPATIENT
Start: 2025-02-03 | End: 2025-02-12 | Stop reason: HOSPADM

## 2025-02-03 RX ORDER — AMOXICILLIN 250 MG
1 CAPSULE ORAL 2 TIMES DAILY
Status: DISCONTINUED | OUTPATIENT
Start: 2025-02-03 | End: 2025-02-10

## 2025-02-03 RX ORDER — BISACODYL 10 MG/1
10 SUPPOSITORY RECTAL DAILY PRN
Status: DISCONTINUED | OUTPATIENT
Start: 2025-02-03 | End: 2025-02-12 | Stop reason: HOSPADM

## 2025-02-03 RX ORDER — SODIUM CHLORIDE, SODIUM LACTATE, POTASSIUM CHLORIDE, CALCIUM CHLORIDE 600; 310; 30; 20 MG/100ML; MG/100ML; MG/100ML; MG/100ML
INJECTION, SOLUTION INTRAVENOUS CONTINUOUS
Status: DISCONTINUED | OUTPATIENT
Start: 2025-02-03 | End: 2025-02-03

## 2025-02-03 RX ORDER — METOPROLOL TARTRATE 1 MG/ML
5 INJECTION, SOLUTION INTRAVENOUS EVERY 6 HOURS PRN
Status: DISCONTINUED | OUTPATIENT
Start: 2025-02-03 | End: 2025-02-12 | Stop reason: HOSPADM

## 2025-02-03 RX ORDER — MUPIROCIN 20 MG/G
OINTMENT TOPICAL 2 TIMES DAILY
Status: COMPLETED | OUTPATIENT
Start: 2025-02-03 | End: 2025-02-03

## 2025-02-03 RX ORDER — CALCIUM GLUCONATE 20 MG/ML
3 INJECTION, SOLUTION INTRAVENOUS
Status: DISCONTINUED | OUTPATIENT
Start: 2025-02-03 | End: 2025-02-12 | Stop reason: HOSPADM

## 2025-02-03 RX ORDER — ONDANSETRON HYDROCHLORIDE 2 MG/ML
4 INJECTION, SOLUTION INTRAVENOUS EVERY 8 HOURS PRN
Status: DISCONTINUED | OUTPATIENT
Start: 2025-02-03 | End: 2025-02-12 | Stop reason: HOSPADM

## 2025-02-03 RX ORDER — FAMOTIDINE 10 MG/ML
20 INJECTION INTRAVENOUS DAILY
Status: DISCONTINUED | OUTPATIENT
Start: 2025-02-03 | End: 2025-02-12

## 2025-02-03 RX ORDER — ESMOLOL HYDROCHLORIDE 20 MG/ML
0-350 INJECTION, SOLUTION INTRAVENOUS CONTINUOUS
Status: DISCONTINUED | OUTPATIENT
Start: 2025-02-03 | End: 2025-02-03

## 2025-02-03 RX ORDER — DIGOXIN 0.25 MG/ML
250 INJECTION INTRAMUSCULAR; INTRAVENOUS ONCE
Status: COMPLETED | OUTPATIENT
Start: 2025-02-03 | End: 2025-02-03

## 2025-02-03 RX ORDER — HYDROXYCHLOROQUINE SULFATE 200 MG/1
200 TABLET, FILM COATED ORAL 2 TIMES DAILY
Status: DISCONTINUED | OUTPATIENT
Start: 2025-02-03 | End: 2025-02-10

## 2025-02-03 RX ADMIN — ESMOLOL HYDROCHLORIDE IN SODIUM CHLORIDE 300 MCG/KG/MIN: 20 INJECTION INTRAVENOUS at 09:02

## 2025-02-03 RX ADMIN — DIGOXIN 250 MCG: 0.25 INJECTION INTRAMUSCULAR; INTRAVENOUS at 05:02

## 2025-02-03 RX ADMIN — SODIUM CHLORIDE, POTASSIUM CHLORIDE, SODIUM LACTATE AND CALCIUM CHLORIDE 250 ML: 600; 310; 30; 20 INJECTION, SOLUTION INTRAVENOUS at 11:02

## 2025-02-03 RX ADMIN — ESMOLOL HYDROCHLORIDE IN SODIUM CHLORIDE 300 MCG/KG/MIN: 20 INJECTION INTRAVENOUS at 07:02

## 2025-02-03 RX ADMIN — ESMOLOL HYDROCHLORIDE IN SODIUM CHLORIDE 300 MCG/KG/MIN: 20 INJECTION INTRAVENOUS at 05:02

## 2025-02-03 RX ADMIN — DIGOXIN 125 MCG: 0.25 INJECTION INTRAMUSCULAR; INTRAVENOUS at 09:02

## 2025-02-03 RX ADMIN — MUPIROCIN: 20 OINTMENT TOPICAL at 09:02

## 2025-02-03 RX ADMIN — SODIUM CHLORIDE, POTASSIUM CHLORIDE, SODIUM LACTATE AND CALCIUM CHLORIDE: 600; 310; 30; 20 INJECTION, SOLUTION INTRAVENOUS at 08:02

## 2025-02-03 RX ADMIN — FAMOTIDINE 20 MG: 10 INJECTION, SOLUTION INTRAVENOUS at 03:02

## 2025-02-03 RX ADMIN — SODIUM CHLORIDE, POTASSIUM CHLORIDE, SODIUM LACTATE AND CALCIUM CHLORIDE 250 ML: 600; 310; 30; 20 INJECTION, SOLUTION INTRAVENOUS at 09:02

## 2025-02-03 RX ADMIN — ESMOLOL HYDROCHLORIDE IN SODIUM CHLORIDE 50 MCG/KG/MIN: 20 INJECTION INTRAVENOUS at 01:02

## 2025-02-03 NOTE — CONSULTS
Pulmonary & Critical Care Medicine Note    Primary Attending Physician: Dr. Nelson  ICU Attending: Dr. Sanford      Subjective:      History of Present Illness:  Eneida Majano is a 84 y.o. female with a PMHx of atrial fibrillation (on eliquis), RA, T2DM, GERD, HTN, HLD, and recent R MCA acute ischemic stroke with occlusion of right M2 branch who [place] [facility] [place] as a transfer from OSH for atrial fibrillation with RVR. In brief, patient initially presented to [facility] on 1/29/25 due to stroke-like symptoms such as dysphagia, left-sided weakness and fixed right lateral gaze. She was transferred to [facility] for MRI which showed areas of infarction in the right MCA distribution and a smaller lateral right parietoccipital cortical infarct with localized mass effect. CTA showed occlusion of the right M2 branch supplying the anterior temporal lobe. She was not a candidate for IV thrombolysis at the time as she was out of the window. AC was held due to concern for hemorrhagic conversion but patient was started on Plavix.  It was suspected that the etiology of stroke was cardio embolic in nature in the setting of afib and non-compliance of eliquis. Per chart review, it was noted that the patient had some improvement in mental status on 1/31 before becoming more confused on 2/1. Pt went into afib with RVR on 2/3 and emsolol gtt was initiated at OSH. TTE revealed EF 26%,  Patient [place] [facility] [place]. On arrival to [facility] [place], patient continues to be in atrial fibrillation with RVR to the 130s. Continues on esmolol gtt. Seen by cardiology who plan to load with Digoxin. Neurology consulted; repeat CT head ordered.       Past Medical History:  Atrial fibrillation  RA  T2DM  GERD  HTN  HLD  R MCA Acute Ischemic Stroke    Past Surgical History:  Cardioversion in 2019  Other surgeries reviewed    Allergies:  Review of patient's allergies indicates:   Allergen  "Reactions    Asa [aspirin] Nausea Only     Family History:  Unable to review with patient 2/2 to mental status    Social History:  Unable to review with patient 2/2 mental status    Review of Systems:  Pertinent items are noted in HPI. All other systems are reviewed and are negative.     Objective:   Last 24 Hour Vital Signs:  BP  Min: 91/60  Max: 189/116  Temp  Av.2 °F (36.8 °C)  Min: 98 °F (36.7 °C)  Max: 98.4 °F (36.9 °C)  Pulse  Av.8  Min: 100  Max: 250  Resp  Av.7  Min: 0  Max: 48  SpO2  Av %  Min: 69 %  Max: 100 %  Height  Av' 11" (149.9 cm)  Min: 4' 11" (149.9 cm)  Max: 4' 11" (149.9 cm)  Weight  Av.8 kg (114 lb 3.2 oz)  Min: 51.8 kg (114 lb 3.2 oz)  Max: 51.8 kg (114 lb 3.2 oz)  No intake/output data recorded.    Physical Examination:  Physical Exam  Constitutional:       Comments: Not alert or oriented. Not following commands. Minimal response to painful stimulation   HENT:      Head: Normocephalic and atraumatic.   Eyes:      Pupils: Pupils are equal, round, and reactive to light.   Cardiovascular:      Rate and Rhythm: Normal rate. Rhythm irregular.      Pulses: Normal pulses.      Heart sounds: Normal heart sounds.   Pulmonary:      Effort: Pulmonary effort is normal.      Comments: Minimal bibasilar crackles  Abdominal:      General: Abdomen is flat.      Palpations: Abdomen is soft.   Musculoskeletal:      Cervical back: Neck supple.   Skin:     General: Skin is warm and dry.   Neurological:      Mental Status: She is disoriented.      Comments: Unable to assess 2/2 to mental status          Laboratory:  Trended Lab Data:  Recent Labs     25  0525 25  0533 25  0534 25  1353   WBC 5.01  --  7.04 8.50  8.50   HGB 12.7  --  12.8 12.0  12.0   HCT 38.5  --  39.8 37.4  37.4     --  256 253  253    138  --  140   K 3.3* 4.1  --  4.6    108  --  107   CO2 23 19*  --  20*   BUN 12 13  --  16   CREATININE 0.8 0.8  --  1.1   * " 125*  --  108   BILITOT  --   --   --  1.3*   AST  --   --   --  26   ALT  --   --   --  13   ALKPHOS  --   --   --  57   CALCIUM 9.2 9.2  --  9.3   ALBUMIN 2.8* 2.9*  --  3.2*   PROT  --   --   --  6.8   MG 1.9 1.9  --   --    PHOS 2.3* 2.5*  --   --    INR  --   --   --  1.2       Cardiac:   Recent Labs   Lab 01/30/25  0338 01/30/25  0934 02/02/25  0525 02/03/25  1353   TROPONINI 0.060* 0.062*  --  0.218*   BNP  --   --  708* 949*       Urinalysis:   Lab Results   Component Value Date    LABURIN  10/28/2024     Multiple organisms isolated. None in predominance.  Repeat if    LABURIN clinically necessary. 10/28/2024    COLORU Yellow 01/28/2025    SPECGRAV 1.030 01/28/2025    NITRITE Negative 01/28/2025    KETONESU Trace (A) 01/28/2025    UROBILINOGEN 2.0 02/04/2020       Microbiology:  Microbiology Results (last 7 days)       Procedure Component Value Units Date/Time    Blood culture [[id number]] Collected: 02/03/25 1325    Order Status: Sent Specimen: Blood     Blood culture [[id number]] Collected: 02/03/25 1325    Order Status: Sent Specimen: Blood     Blood culture [[id number]]     Order Status: Canceled Specimen: Blood             Radiology:  CT Head Without Contrast   Final Result      Evolving right MCA vascular territory infarcts         Electronically signed by: Willy Francois Jr   Date:    02/03/2025   Time:    16:22      X-ray chest AP portable   Final Result      1. Chronic appearing interstitial findings suggesting edema.  No large focal consolidation.         Electronically signed by: Gabriel Gilmore MD   Date:    02/03/2025   Time:    15:02           I have personally reviewed the above labs and imaging.    Current Medications:     Infusions:   esmolol  0-300 mcg/kg/min Intravenous Continuous 27.2 mL/hr at 02/03/25 1512 175 mcg/kg/min at 02/03/25 1512        Scheduled:   atorvastatin  80 mg Oral Daily    electrolytes-dextrose  200 mL Oral Q4H    famotidine (PF)  20 mg Intravenous Daily     hydroxychloroquine  200 mg Oral BID    metoprolol tartrate  50 mg Oral Q6H    mupirocin   Nasal BID    senna-docusate 8.6-50 mg  1 tablet Oral BID        PRN:    Current Facility-Administered Medications:     acetaminophen, 650 mg, Rectal, Q6H PRN    bisacodyL, 10 mg, Rectal, Daily PRN    calcium gluconate IVPB, 1 g, Intravenous, PRN    calcium gluconate IVPB, 2 g, Intravenous, PRN    calcium gluconate IVPB, 3 g, Intravenous, PRN    dextrose 50%, 12.5 g, Intravenous, PRN    glucagon (human recombinant), 1 mg, Intramuscular, PRN    haloperidol lactate, 1 mg, Intravenous, Q6H PRN    hydrOXYzine pamoate, 25 mg, Oral, Q6H PRN    insulin aspart U-100, 0-5 Units, Subcutaneous, Q6H PRN    labetaloL, 10 mg, Intravenous, Q6H PRN    magnesium sulfate 2 g IVPB, 2 g, Intravenous, PRN    magnesium sulfate 2 g IVPB, 2 g, Intravenous, PRN    ondansetron, 4 mg, Intravenous, Q8H PRN    sodium chloride 0.9%, 10 mL, Intravenous, PRN    sodium phosphate 15 mmol in D5W 250 mL IVPB, 15 mmol, Intravenous, PRN    sodium phosphate 20.01 mmol in D5W 250 mL IVPB, 20.01 mmol, Intravenous, PRN    sodium phosphate 30 mmol in D5W 250 mL IVPB, 30 mmol, Intravenous, PRN     Assessment:   Eneida Majano is a 84 y.o. female with a PMHx of atrial fibrillation (on eliquis), RA, T2DM, GERD, HTN, HLD, and recent R MCA acute ischemic stroke with occlusion of right M2 branch who presented to [facility] on 2/3/25 as a transfer from OSH for atrial fibrillation with RVR. Upon presentation to [facility], patient encephalopathic with minimal response to painful stimuli. Protecting airway and saturating well on 2L NC with episodes of apnea. Pt initially in afib with RVR to 140s and normotensive, however, later became hypotensive with MAPS in the 50s. There is concern that recent stroke vs. Volume overload is the etiology for afib with RVR.    Plan by systems:   Neuro:  #Right MCA Stroke   #Acute Encephalopathy  - patient presents with R MCA stroke seen on MRI at OSH with  occlusion of right M2 branch; patient not a candidate for tPA due to last known normal >4.5 hours.   - initial deficits were dysphagia, left sided weakness and confusion  - AIS likely cardioembolic in origin due to eliquis non-adherence per chart review  - Neurology consulted; appreciate recommendations   -continue high intensity statin   - OK to start eliquis 2-14 days after stroke    - consider more permanent intervention for atrial fibrillation    -repeat CT head- evolving right MCA vascular territory infarct without evidence of hemorrhage    - EEG ordered per neuro       Cardiovascular/Hemodynamics:  #Atrial Fibrillation with RVR  #HFrEF with EF 28%  - patient in afib with RVR to the 140s-150s with normal BP  - placed on esmolol gtt at OSH; continue  - lopressor 50 mg q6  - dig level < 0.1; will load with digoxin  - consider diuresis as patient BNP elevated to 900 and CXR with pulmonary edema  - continue atorvastatin 80 mg  - keep K >4, Mg >2    Respiratory:   #Acute Hypoxic Respiratory Failure  - CXR without focal consolidation- no concern for PNA at this time; WBC wnl  - continue on 2L NC    GI/FEN:  - no acute concerns  - replete lytes prn  - bowel regimen with bisacodyl suppository, senna    ID:   - no acute concerns for infectious etiology as patient has been afebrile and without lekocytosis  -UA pending  - blood cultures pending    Renal:   -no acute concerns  - cr stable    Heme/Onc:   - no acute concerns    Endocrine:  - SSI + Accuchecks  - Goal glucose 140-180 while in ICU    Rheum/MSK:  - no acute concerns    ICU Checklist  RASS/CAM:   Feeding: NPO until SLP eval  Analgesia: None  Sedation: None  Thrombo PPX: holding in setting of recent stroke  Head of Bed: > 30 degrees  Ulcer PPX: famotidine  Glucose: goal 140-180s, hypoglycemic ppx  SAT/SBT: NA  Bowel Regimen: senna, bisacodyl  Indwelling Lines: PIV  Abx: none  Family Discussions: not present at bedside   Code Status: Full  Dispo: Continue ICU  Care      Migueleamonluz elena Cecilioil [facility] Medicine PGY1    Patient seen with Dr. Sanford. Please appreciate attending attestation.    Portions of the record may have been created with voice-recognition software. Occasional wrong-word or sound-a-like substitutions may have occurred due to the inherent limitations of voice-recognition software. Read the chart carefully and recognize, using context, where substitutions have occurred.     Pt seen and examined with Pulmonary/Critical Care team and this note was reviewed and validated with the following additional comments: Poorly responsive to verbal stimuli. A-fib with HR 140s in spite of high dose esmolol. LVEF reduced. Has some pulmonary congestion.  Diuresing. Discussed with Cardiology their plan for rate control. Start digoxin.    Critical Care time was spent validating the history and physical exam, reviewing the lab and imaging results, and discussing the care of the patient with the bedside nurse and the patient and/or surrogates. This critical care time did not overlap with that of any other provider or involve time for any procedures.  This patient has a high probability of sudden clinically significant deterioration which requires the highest level of physician preparedness to intervene urgently. I managed/supervised life or organ supporting interventions that required frequent physician assessments. I devoted my full attention in the ICU to the direct care of this patient for this period of time. Organ systems which are failing and require intensive, critical care support are: cardiovascular, pulmonary  Critical Care time: 35 minutes    Marcos Sanford MD  Phone [phone]

## 2025-02-03 NOTE — HPI
HPI retrieved from chart, patient largely nonverbal and no family at bedside. 84-year-old female with a history of anemia, atrial fibrillation/flutter (prescribed Eliquis as OP), rheumatoid arthritis, diabetes, gastroesophageal reflux disease, hypertension, hyperlipidemia, and insomnia transferred to [facility] on January 28 with aphasia/dysarthria, right lateral gaze, and left-sided weakness.  CT head showed evidence of a right MCA distribution stroke along with other areas of infarct.  CTA of the head and neck showed occlusion of the right M2 branch.  She had Vascular Neurology tele-consultation and was felt not to be a thrombolytic candidate.  She was also not a thrombectomy candidate with a large core infarct on imaging.  MRI brain also showed the areas of infarct.  She was admitted with right MCA stroke, atrial flutter/fibrillation with rapid ventricular response, and encephalopathy.  Anticoagulation was held due to concern for potential hemorrhagic conversion.  Of note, troponin was elevated and she had echocardiogram that showed EF 28% with decreased right ventricular systolic function and moderate pulmonary hypertension.  Mentation improved somewhat by January 31.  She was placed on Plavix with plans to resume anticoagulation after 7 days.  By February 1, she had increased confusion.  She was more tachycardic.  On the morning of February 3, tachycardia persisted and she was upgraded in status and started on esmolol infusion.  She was seen by Cardiology.  On the morning of February 3 she was awake but confused and not following commands.  She was unable to swallow oral medicines.  Blood pressure remained stable.  [facility] does not have capability to continue management with esmolol infusion.  Case discussed with Cardiology.  Will plan transfer to Hospital Medicine at [facility] in ICU status for continued rate control with esmolol.  She will need Cardiology consultation for  additional management.  She would also benefit neurology consultation.     February 3:  Sodium 138, potassium 4.1, chloride 108, CO2 19, BUN 13, creatinine 0.8, glucose 125, magnesium 1.9, white blood cells 7.04, hemoglobin 12.8, hematocrit 39.8, platelets 256  -EKG showed atrial fibrillation with ventricular rate 123.  T-wave abnormality.      Patient transferred to [place] for higher level of care. Cardiology consulted for CHF and AF RVR. HR currently 120s with periods up to 140.

## 2025-02-03 NOTE — ASSESSMENT & PLAN NOTE
Presented on January 28, 2025 with large stroke, patient was not a candidate for tPA versus thrombectomy  Tele stroke was consulted.  The all anticoagulants to be started after in 7 days on February 4th  Neurology consulted at [facility] after arrival on 02/03       Antithrombotics for secondary stroke prevention: Antiplatelets: None:  Large size stroke on 01/28 patient to wait total of 7 days to restart anticoagulants on 2/for    Statins for secondary stroke prevention and hyperlipidemia, if present:   Statins: Atorvastatin- 40 mg daily    Aggressive risk factor modification: HTN, DM, A-Fib, CAD     Rehab efforts: The patient has been evaluated by a stroke team provider and the therapy needs have been fully considered based off the presenting complaints and exam findings. The following therapy evaluations are needed: PT evaluate and treat, OT evaluate and treat, SLP evaluate and treat    Diagnostics ordered/pending: Carotid ultrasound to assess vasculature, CT scan of head without contrast to asses brain parenchyma, CTA Head to assess vasculature , HgbA1C to assess blood glucose levels, Lipid Profile to assess cholesterol levels, MRA head to assess vasculature, MRA neck/arch to assess vasculature, MRI head without contrast to assess brain parenchyma, TTE to assess cardiac function/status , TSH to assess thyroid function    VTE prophylaxis: None: Reason for No Pharmacological VTE Prophylaxis:  Due to large stroke per Neurology recommendation had [facility] patient to be started after 7 days (2/4    BP parameters: Infarct:  Out of the window at this moment to maintain blood pressure within normal values

## 2025-02-03 NOTE — ASSESSMENT & PLAN NOTE
TTE    Left Ventricle: The left ventricle is normal in size. Mildly increased   wall thickness. Global hypokinesis present. There is severely reduced   systolic function. Quantitated ejection fraction is 28%. Unable to assess   diastolic function due to atrial fibrillation.    Right Ventricle: Right ventricular enlargement. Systolic function is   reduced.    Left Atrium: Left atrium is severely dilated. Agitated saline study of   the atrial septum is negative after vasalva maneuver, suggesting absence   of intracardiac shunt at the atrial level.    Right Atrium: Right atrium is dilated.    Mitral Valve: There is moderate regurgitation.  EROA 0.31 cm2    Tricuspid Valve: There is moderate regurgitation.    Pulmonary Artery: There is moderate pulmonary hypertension. The   estimated pulmonary artery systolic pressure is 60 mmHg.    IVC/SVC: Intermediate venous pressure at 8 mmHg.    Previous TTEs with normal LVEF  Will proceed with ischemic evaluation, likely as OP given acute CVA  No CP reported PTA or since admission     Not overloaded on exam

## 2025-02-03 NOTE — ASSESSMENT & PLAN NOTE
Patient's FSGs are controlled on current medication regimen.  Last A1c reviewed-   Lab Results   Component Value Date    HGBA1C 5.7 (H) 01/29/2025     Most recent fingerstick glucose reviewed-   Recent Labs   Lab 02/03/25  0300 02/03/25  0537 02/03/25  0814 02/03/25  1244   POCTGLUCOSE 137* 121* 126* 102     Current correctional scale  Low  Maintain anti-hyperglycemic dose as follows-   Antihyperglycemics (From admission, onward)      Start     Stop Route Frequency Ordered    02/03/25 1413  insulin aspart U-100 pen 0-5 Units         -- SubQ Every 6 hours PRN 02/03/25 1313          Hold Oral hypoglycemics while patient is in the hospital.

## 2025-02-03 NOTE — HPI
84-year-old female with a history of anemia, atrial fibrillation/flutter (on Eliquis), rheumatoid arthritis, diabetes, gastroesophageal reflux disease, hypertension, hyperlipidemia, and insomnia , pt was transferred to [facility] on January 28 with aphasia/dysarthria, right lateral gaze, and left-sided weakness. CT head showed evidence of a right MCA distribution stroke along with other areas of infarct. CTA of the head and neck showed occlusion of the right M2 branch. She had Vascular Neurology tele-consultation and was felt not to be a thrombolytic candidate. She was also not a thrombectomy candidate with a large core infarct on imaging. MRI brain on 1/29 also showed the areas of infarct. She was admitted with right MCA stroke, atrial flutter/fibrillation with rapid ventricular response, and encephalopathy. Anticoagulation was held due to concern for potential hemorrhagic conversion. Also troponin was elevated and she had echocardiogram that showed EF 28% with decreased right ventricular systolic function and moderate pulmonary hypertension. Mentation improved somewhat by January 31. She was placed on Plavix with plans to resume anticoagulation after 7 days. By February 1, she had increased confusion. She was more tachycardic. On the morning of February 3, tachycardia persisted and she was upgraded in status and started on esmolol infusion. She was seen by Cardiology. On the morning of February 3 she was awake but confused and not following commands. She was unable to swallow oral medicines. Blood pressure remained stable. Pt was transfer to Hospital Medicine at [facility] in ICU status for continued rate control with esmolol.       On day of transfer to [facility] :  February 3: Sodium 138, potassium 4.1, chloride 108, CO2 19, BUN 13, creatinine 0.8, glucose 125, magnesium 1.9, white blood cells 7.04, hemoglobin 12.8, hematocrit 39.8, platelets 256  -EKG showed atrial  fibrillation with ventricular rate 123. T-wave abnormality.    January 31: AST 23, ALT 20  -abdominal ultrasound had no acute findings    January 30: CT head showed redemonstration of acute right MCA infarct. No acute intracranial hemorrhage.  -INR 1.1  -echocardiogram had EF 28%. Unable to assess diastolic function due to atrial fibrillation. Decreased right ventricular systolic function. Right atrium is dilated. Moderate mitral regurgitation. Moderate tricuspid regurgitation. Moderate pulmonary hypertension with estimated PA systolic pressure 60 mmHg. Intermediate venous pressure at mmHg.    January 29: MRI brain had areas of acute infarction in the right MCA distribution largest involving the right perisylvian and lateral right frontal regions and smaller lateral right parieto-occipital cortical infarct with associated localized mass effect including effacement of adjacent portions of the right sylvian fissure and overlying cortical sulci. Non acute posterior left frontal subcortical white matter infarct. Moderate presumed microvascular ischemic change

## 2025-02-03 NOTE — SUBJECTIVE & OBJECTIVE
Past Medical History:   Diagnosis Date    Acid reflux     Anemia     Arthritis     Atrial fibrillation     Carpal tunnel syndrome     Cataract     Dry mouth     GERD (gastroesophageal reflux disease)     Hyperlipidemia     Hypertension     Insomnia     Other osteoporosis without current pathological fracture 3/14/2018    PONV (postoperative nausea and vomiting)     short term       Past Surgical History:   Procedure Laterality Date    APPENDECTOMY      CARDIOVERSION N/A 2019    Procedure: Cardioversion;  Surgeon: Emmanuel Matthew MD;  Location: [facility] CATH LAB;  Service: Cardiology;  Laterality: N/A;    COLONOSCOPY      COLONOSCOPY N/A 2020    Procedure: COLONOSCOPY;  Surgeon: Gopi Brooke MD;  Location: [facility] ENDO;  Service: Endoscopy;  Laterality: N/A;    COLONOSCOPY N/A 2020    Procedure: COLONOSCOPY;  Surgeon: Gopi Brooke MD;  Location: [facility] ENDO;  Service: Endoscopy;  Laterality: N/A;    ESOPHAGOGASTRODUODENOSCOPY N/A 2020    Procedure: EGD (ESOPHAGOGASTRODUODENOSCOPY);  Surgeon: Gopi Brooke MD;  Location: [facility];  Service: Endoscopy;  Laterality: N/A;    FOOT SURGERY Left     HAND SURGERY      HERNIA REPAIR      x2    HYSTERECTOMY  age 32    fibroids    OOPHORECTOMY         Review of patient's allergies indicates:   Allergen Reactions    Asa [aspirin] Nausea Only       Current Facility-Administered Medications on File Prior to Encounter   Medication    [COMPLETED] esmoloL injection 24,000 mcg    [COMPLETED] esmoloL injection 24,000 mcg    [COMPLETED] esmoloL injection 24,000 mcg    [] lactated ringers infusion    [COMPLETED] metoprolol injection 5 mg    [DISCONTINUED] acetaminophen suppository 650 mg    [DISCONTINUED] acetaminophen tablet 650 mg    [DISCONTINUED] atorvastatin tablet 40 mg    [DISCONTINUED] bisacodyL suppository 10 mg    [DISCONTINUED] calcium carbonate 200 mg calcium (500 mg) chewable tablet 500 mg    [DISCONTINUED] clopidogreL tablet 75  mg    [DISCONTINUED] dextrose 50% injection 12.5 g    [DISCONTINUED] electrolytes-dextrose (Pedialyte) oral solution 200 mL    [DISCONTINUED] enoxaparin injection 30 mg    [DISCONTINUED] esmolol 2000 mg in sodium chloride 0.9% 100 mL (20 mg/mL)    [DISCONTINUED] glucagon (human recombinant) injection 1 mg    [DISCONTINUED] haloperidol lactate injection 1 mg    [DISCONTINUED] hydroxychloroquine tablet 200 mg    [DISCONTINUED] hydrOXYzine pamoate capsule 25 mg    [DISCONTINUED] insulin aspart U-100 pen 0-5 Units    [DISCONTINUED] labetaloL injection 10 mg    [DISCONTINUED] metoprolol tartrate (LOPRESSOR) tablet 50 mg    [DISCONTINUED] metoprolol tartrate (LOPRESSOR) tablet 50 mg    [DISCONTINUED] mupirocin 2 % ointment    [DISCONTINUED] ondansetron injection 4 mg    [DISCONTINUED] prochlorperazine injection Soln 5 mg    [DISCONTINUED] senna-docusate 8.6-50 mg per tablet 1 tablet    [DISCONTINUED] sodium chloride 0.9% flush 10 mL     Current Outpatient Medications on File Prior to Encounter   Medication Sig    acetaminophen (TYLENOL) 650 MG TbSR Take 1 tablet (650 mg total) by mouth 3 (three) times daily as needed.    albuterol (PROVENTIL/VENTOLIN HFA) 90 mcg/actuation inhaler INHALE 2 PUFFS INTO THE LUNGS EVERY 6 HOURS AS NEEDED WHEEZING RESCUE    apixaban (ELIQUIS) 2.5 mg Tab Take 1 tablet (2.5 mg total) by mouth 2 (two) times daily.    aspirin 81 MG Chew Take 1 tablet (81 mg total) by mouth once daily.    baclofen (LIORESAL) 5 mg Tab tablet Take 2 tablets (10 mg total) by mouth after lunch for 3 days, THEN 1 tablet (5 mg total) after lunch for 2 days, THEN 0.5 tablets (2.5 mg total) after lunch for 2 days.    benzonatate (TESSALON) 200 MG capsule Take 1 capsule (200 mg total) by mouth 3 (three) times daily as needed for Cough.    blood pressure test kit-large Kit 1 kit by Misc.(Non-Drug; Combo Route) route 2 (two) times daily as needed.    blood sugar diagnostic (TRUE METRIX GLUCOSE TEST STRIP) Strp Check BS once  daily    blood-glucose meter (TRUE METRIX GLUCOSE METER) kit Use to check BS once daily    diltiaZEM (CARDIZEM CD) 180 MG 24 hr capsule Take 1 capsule (180 mg total) by mouth once daily.    fluticasone propionate (FLONASE) 50 mcg/actuation nasal spray 1 spray to each nostril twice daily for 7 days, then may continue 1 spray to each nostril ONCE daily as needed for sinus congestion.    furosemide (LASIX) 20 MG tablet Take 1 tablet (20 mg total) by mouth daily as needed (edema).    hydroxychloroquine (PLAQUENIL) 200 mg tablet Take 1 tablet (200 mg total) by mouth 2 (two) times daily.    lancets (LANCETS,THIN) Misc Check BS once daily    lisinopriL 10 MG tablet Take 1 tablet (10 mg total) by mouth every evening. for 120 doses    metoprolol succinate (TOPROL-XL) 50 MG 24 hr tablet Take 1 tablet (50 mg total) by mouth 2 (two) times daily.    nystatin (MYCOSTATIN) cream Apply topically 2 (two) times daily.    nystatin (MYCOSTATIN) powder Apply topically 4 (four) times daily.    polyethylene glycol (GLYCOLAX) 17 gram/dose powder Take 17 g by mouth once daily.    triamcinolone acetonide 0.1% (KENALOG) 0.1 % cream Apply topically 2 (two) times daily. for 7 days     Family History       Problem Relation (Age of Onset)    Arthritis Father    Asthma Daughter    Cancer Father, Sister    Diabetes Sister    Heart disease Sister, Brother    Leukemia Mother          Tobacco Use    Smoking status: Never     Passive exposure: Never    Smokeless tobacco: Never   Substance and Sexual Activity    Alcohol use: No     Alcohol/week: 0.0 standard drinks of alcohol    Drug use: No    Sexual activity: Not Currently     Partners: Male     Birth control/protection: Surgical     Review of Systems   Unable to perform ROS: Mental status change     Objective:     Vital Signs (Most Recent):  Temp: 98 °F (36.7 °C) (02/03/25 1305)  Pulse: (!) 116 (02/03/25 1305)  Resp: 16 (02/03/25 1305)  BP: 117/75 (02/03/25 1305)  SpO2: 100 % (02/03/25 1305) Vital  Signs (24h Range):  Temp:  [98 °F (36.7 °C)-98.4 °F (36.9 °C)] 98 °F (36.7 °C)  Pulse:  [110-250] 116  Resp:  [0-48] 16  SpO2:  [69 %-100 %] 100 %  BP: (114-189)/() 117/75     Weight: 51.8 kg (114 lb 3.2 oz)  Body mass index is 23.07 kg/m².     Physical Exam  Constitutional:       General: She is in acute distress.      Appearance: She is ill-appearing and toxic-appearing.      Comments: Very cachectic   HENT:      Head: Normocephalic and atraumatic.      Nose: Nose normal.      Mouth/Throat:      Mouth: Mucous membranes are dry.   Cardiovascular:      Rate and Rhythm: Tachycardia present. Rhythm irregular.   Pulmonary:      Effort: Pulmonary effort is normal.      Breath sounds: Normal breath sounds.   Abdominal:      General: Abdomen is flat.      Palpations: Abdomen is soft.   Musculoskeletal:         General: No swelling or tenderness.      Cervical back: Normal range of motion.      Right lower leg: No edema.      Left lower leg: No edema.   Skin:     General: Skin is dry.      Capillary Refill: Capillary refill takes less than 2 seconds.      Coloration: Skin is pale.   Neurological:      Mental Status: She is disoriented.                Significant Labs: All pertinent labs within the past 24 hours have been reviewed.  Recent Lab Results  (Last 5 results in the past 24 hours)        02/03/25  1353   02/03/25  1244   02/03/25  0814   02/03/25  0537   02/03/25  0536        Allens Test         N/A       Ammonia 28               PTT 26.5  Comment: Refer to local heparin nomogram for intensity/dose specific   therapeutic   range.  LOT^050^APTT [facility]^[id number]                 Baso # 0.03               Basophil % 0.4                 Comment: Values of less than 100 pg/ml are consistent with non-CHF populations.               Site         Other       Calcium Level Ionized 1.12               DelSys         Nasal Can       Differential Method Automated               Eos # 0.1               Eos % 0.6                Gran # (ANC) 6.5               Gran % 75.9               Hematocrit 37.4                37.4               Hemoglobin 12.0                12.0               Immature Grans (Abs) 0.02  Comment: Mild elevation in immature granulocytes is non specific and   can be seen in a variety of conditions including stress response,   acute inflammation, trauma and pregnancy. Correlation with other   laboratory and clinical findings is essential.                 Immature Granulocytes 0.2               INR 1.2  Comment: Coumadin Therapy:  2.0 - 3.0 for INR for all indicators except mechanical heart valves  and antiphospholipid syndromes which should use 2.5 - 3.5.  LOT^040^PT [facility]^[id number]                 Lactic Acid Level 1.7  Comment: Falsely low lactic acid results can be found in samples   containing >=13.0 mg/dL total bilirubin and/or >=3.5 mg/dL   direct bilirubin.                 Lymph # 1.4               Lymph % 16.1               MCH 28.7                28.7               MCHC 32.1                32.1               MCV 90                90               Mono # 0.6               Mono % 6.8               MPV 10.3                10.3               nRBC 0               Platelet Count 253                253               POC BE         -1       POC HCO3         22.9       POC PCO2         31.0       POC PH         7.477       POC PO2         31       POC SATURATED O2         65       POC TCO2         24       POCT Glucose   102   126   121         PT 13.1               RBC 4.18                4.18               RDW 15.5                15.5               Sample         VENOUS       Troponin I 0.218  Comment: The reference interval for Troponin I represents the 99th percentile   cutoff   for our facility and is consistent with 3rd generation assay   performance.                 WBC 8.50                8.50                                      Significant Imaging: I have reviewed all pertinent imaging results/findings within the  past 24 hours.      Echo Saline Bubble? Yes; Ultrasound enhancing contrast? Yes    Result Date: 1/30/2025    Left Ventricle: The left ventricle is normal in size. Mildly increased   wall thickness. Global hypokinesis present. There is severely reduced   systolic function. Quantitated ejection fraction is 28%. Unable to assess   diastolic function due to atrial fibrillation.    Right Ventricle: Right ventricular enlargement. Systolic function is   reduced.    Left Atrium: Left atrium is severely dilated. Agitated saline study of   the atrial septum is negative after vasalva maneuver, suggesting absence   of intracardiac shunt at the atrial level.    Right Atrium: Right atrium is dilated.    Mitral Valve: There is moderate regurgitation.  EROA 0.31 cm2    Tricuspid Valve: There is moderate regurgitation.    Pulmonary Artery: There is moderate pulmonary hypertension. The   estimated pulmonary artery systolic pressure is 60 mmHg.    IVC/SVC: Intermediate venous pressure at 8 mmHg.     EXAMINATION:  CTA HEAD AND NECK (XPD)     CLINICAL HISTORY:  Neuro deficit, acute, stroke suspected;Stroke/TIA, determine embolic source;     TECHNIQUE:  Thin section post IV contrast images were obtained through the cerebral vasculature.  Multiplanar MIP reformations were performed.  Iterative reconstruction technique was used.     CT/cardiac nuclear exam/s in prior 12 months:  1     COMPARISON:  Head CT 01/28/2025.     FINDINGS:  Aortic arch: Three-vessel arch with mild plaque.  Origins of the great vessels patent     Right carotid: No significant plaque.  Patent ICA.  Patent CCA.     Left carotid: No significant plaque.  Patent ICA.  Patent CCA.     Vertebrals: Patent.  Left dominant.     Anterior circulation: Anterior cerebral arteries patent.  Occlusion M2 branch supplying the right anterior temporal lobe.     Posterior circulation: Basilar and posterior cerebral arteries patent.     No evidence of neck mass or lymph node enlargement.   Small bilateral pleural fluid collections.     Impression:     1. Occlusion of the right M2 branch supplying the anterior temporal lobe.  2. Small bilateral pleural fluid collections.   MRI BRAIN WITHOUT CONTRAST     CLINICAL HISTORY:  Stroke, follow up;     TECHNIQUE:  Brain studied using sagittal and axial T1, axial T2, FLAIR and DW Images.     COMPARISON:  CT head 01/28/2025     FINDINGS:  Areas of acute infarction are evident the largest of which right MCA distribution involving the anterior right perisylvian and lateral right frontal regions measuring approximately 4.9 x 2.9 cm and smaller lateral right parietooccipital cortical infarct measuring approximately 2.6 x 2.1 cm as seen on the prior CT.  Associated localized mass effect again noted including effacement of the adjacent portions ipsilateral right sylvian fissure and overlying cortical sulci.     Small possibly subacute or chronic posterior left frontal subcortical infarct measuring approximately 1.5 cm with small focus involvement of the overlying cortex noted.  Additional nonspecific T2 hyperintensities white matter may reflect moderate microvascular ischemic change.  Old lacunar infarcts and or incidental prominent perivascular spaces bilateral basal ganglia and thalami.  Presumed microvascular ischemic change central ange.     Visualized paranasal sinuses are clear as are mastoid air cells.     Impression:     Areas of acute infarction right MCA distribution largest involving the right perisylvian and lateral right frontal regions measuring 4.9 x 2.9 cm and smaller lateral right parietooccipital cortical infarct measuring 2.6 x 2.1 cm with associated localized mass effect including effacement of adjacent portions right sylvian fissure and overlying cortical sulci.     Nonacute posterior left frontal subcortical white matter infarct with small focus overlying cortical involvement.     Moderate presumed microvascular ischemic change white matter.    CT HEAD WITHOUT CONTRAST     CLINICAL HISTORY:  Stroke, follow up;     TECHNIQUE:  Low dose axial images were obtained through the head.  Coronal and sagittal reformations were also performed. Contrast was not administered.     COMPARISON:  Same day CT and MR     FINDINGS:  Intracranial compartment:     No hydrocephalus.  No extra-axial blood or fluid collections.     Redemonstrated acute right MCA infarct.  Background of moderate chronic microvascular ischemia.  No acute intracranial hemorrhage.     Skull/extracranial contents (limited evaluation): No fracture. Mastoid air cells and paranasal sinuses are essentially clear.     Impression:     Acute right MCA infarct.  No acute intracranial hemorrhage.        Electronically signed by:Derrick Magdaleno  Date:                                            01/30/2025  Time:                                           17:29

## 2025-02-03 NOTE — ASSESSMENT & PLAN NOTE
Patient has paroxysmal (<7 days) atrial fibrillation. Patient is currently in atrial fibrillation. [id number] Score: 5. The patients heart rate in the last 24 hours is as follows:  Pulse  Min: 110  Max: 250     Antiarrhythmics  esmolol 2000 mg in sodium chloride 0.9% 100 mL (20 mg/mL), Continuous, Intravenous  metoprolol tartrate (LOPRESSOR) tablet 50 mg, Every 6 hours, Oral    Anticoagulants   On hold 2/2 acute CVA due to risk of hemorrhagic conversion      Plan  - Replete lytes with a goal of K>4, Mg >2  - Patient is not anticoagulated due to acute CVA  - Patient's afib is currently uncontrolled. Will adjust treatment as follows: Checking Digoxin level and will load with Digoxin

## 2025-02-03 NOTE — ASSESSMENT & PLAN NOTE
Patient's blood pressure range in the last 24 hours was: BP  Min: 114/94  Max: 189/116.The patient's inpatient anti-hypertensive regimen is listed below:  Current Antihypertensives  esmolol 2000 mg in sodium chloride 0.9% 100 mL (20 mg/mL), Continuous, Intravenous  labetaloL injection 10 mg, Every 6 hours PRN, Intravenous  metoprolol tartrate (LOPRESSOR) tablet 50 mg, Every 6 hours, Oral    Plan  - BP is controlled, no changes needed to their regimen  - controlled current medications are to control AFib RVR

## 2025-02-03 NOTE — H&P
[facility] Medicine  History & Physical    Patient Name: Eneida Majano  MRN: [id number]  Patient Class: IP- Inpatient  Admission Date: 2/3/2025  Attending Physician: Fozia Avila MD  Primary Care Provider: Paula Cooper NP         Patient information was obtained from ER records.     Subjective:     Principal Problem:<principal problem not specified>    Chief Complaint: No chief complaint on file.       HPI: 84-year-old female with a history of anemia, atrial fibrillation/flutter (on Eliquis), rheumatoid arthritis, diabetes, gastroesophageal reflux disease, hypertension, hyperlipidemia, and insomnia , pt was transferred to [facility] on January 28 with aphasia/dysarthria, right lateral gaze, and left-sided weakness. CT head showed evidence of a right MCA distribution stroke along with other areas of infarct. CTA of the head and neck showed occlusion of the right M2 branch. She had Vascular Neurology tele-consultation and was felt not to be a thrombolytic candidate. She was also not a thrombectomy candidate with a large core infarct on imaging. MRI brain on 1/29 also showed the areas of infarct. She was admitted with right MCA stroke, atrial flutter/fibrillation with rapid ventricular response, and encephalopathy. Anticoagulation was held due to concern for potential hemorrhagic conversion. Also troponin was elevated and she had echocardiogram that showed EF 28% with decreased right ventricular systolic function and moderate pulmonary hypertension. Mentation improved somewhat by January 31. She was placed on Plavix with plans to resume anticoagulation after 7 days. By February 1, she had increased confusion. She was more tachycardic. On the morning of February 3, tachycardia persisted and she was upgraded in status and started on esmolol infusion. She was seen by Cardiology. On the morning of February 3 she was awake but confused and not following commands. She was  unable to swallow oral medicines. Blood pressure remained stable. Pt was transfer to Hospital Medicine at [facility] in ICU status for continued rate control with esmolol.       On day of transfer to [facility] :  February 3: Sodium 138, potassium 4.1, chloride 108, CO2 19, BUN 13, creatinine 0.8, glucose 125, magnesium 1.9, white blood cells 7.04, hemoglobin 12.8, hematocrit 39.8, platelets 256  -EKG showed atrial fibrillation with ventricular rate 123. T-wave abnormality.    January 31: AST 23, ALT 20  -abdominal ultrasound had no acute findings    January 30: CT head showed redemonstration of acute right MCA infarct. No acute intracranial hemorrhage.  -INR 1.1  -echocardiogram had EF 28%. Unable to assess diastolic function due to atrial fibrillation. Decreased right ventricular systolic function. Right atrium is dilated. Moderate mitral regurgitation. Moderate tricuspid regurgitation. Moderate pulmonary hypertension with estimated PA systolic pressure 60 mmHg. Intermediate venous pressure at mmHg.    January 29: MRI brain had areas of acute infarction in the right MCA distribution largest involving the right perisylvian and lateral right frontal regions and smaller lateral right parieto-occipital cortical infarct with associated localized mass effect including effacement of adjacent portions of the right sylvian fissure and overlying cortical sulci. Non acute posterior left frontal subcortical white matter infarct. Moderate presumed microvascular ischemic change     Past Medical History:   Diagnosis Date    Acid reflux     Anemia     Arthritis     Atrial fibrillation     Carpal tunnel syndrome     Cataract     Dry mouth     GERD (gastroesophageal reflux disease)     Hyperlipidemia     Hypertension     Insomnia     Other osteoporosis without current pathological fracture 3/14/2018    PONV (postoperative nausea and vomiting)     short term       Past Surgical History:   Procedure Laterality Date     APPENDECTOMY      CARDIOVERSION N/A 2019    Procedure: Cardioversion;  Surgeon: Emmanuel Matthew MD;  Location: [facility] CATH LAB;  Service: Cardiology;  Laterality: N/A;    COLONOSCOPY      COLONOSCOPY N/A 2020    Procedure: COLONOSCOPY;  Surgeon: Gopi Brooke MD;  Location: [facility] ENDO;  Service: Endoscopy;  Laterality: N/A;    COLONOSCOPY N/A 2020    Procedure: COLONOSCOPY;  Surgeon: Gopi Brooke MD;  Location: [facility] ENDO;  Service: Endoscopy;  Laterality: N/A;    ESOPHAGOGASTRODUODENOSCOPY N/A 2020    Procedure: EGD (ESOPHAGOGASTRODUODENOSCOPY);  Surgeon: Gopi Brooke MD;  Location: [facility] ENDO;  Service: Endoscopy;  Laterality: N/A;    FOOT SURGERY Left     HAND SURGERY      HERNIA REPAIR      x2    HYSTERECTOMY  age 32    fibroids    OOPHORECTOMY         Review of patient's allergies indicates:   Allergen Reactions    Asa [aspirin] Nausea Only       Current Facility-Administered Medications on File Prior to Encounter   Medication    [COMPLETED] esmoloL injection 24,000 mcg    [COMPLETED] esmoloL injection 24,000 mcg    [COMPLETED] esmoloL injection 24,000 mcg    [] lactated ringers infusion    [COMPLETED] metoprolol injection 5 mg    [DISCONTINUED] acetaminophen suppository 650 mg    [DISCONTINUED] acetaminophen tablet 650 mg    [DISCONTINUED] atorvastatin tablet 40 mg    [DISCONTINUED] bisacodyL suppository 10 mg    [DISCONTINUED] calcium carbonate 200 mg calcium (500 mg) chewable tablet 500 mg    [DISCONTINUED] clopidogreL tablet 75 mg    [DISCONTINUED] dextrose 50% injection 12.5 g    [DISCONTINUED] electrolytes-dextrose (Pedialyte) oral solution 200 mL    [DISCONTINUED] enoxaparin injection 30 mg    [DISCONTINUED] esmolol 2000 mg in sodium chloride 0.9% 100 mL (20 mg/mL)    [DISCONTINUED] glucagon (human recombinant) injection 1 mg    [DISCONTINUED] haloperidol lactate injection 1 mg    [DISCONTINUED] hydroxychloroquine tablet 200 mg    [DISCONTINUED]  hydrOXYzine pamoate capsule 25 mg    [DISCONTINUED] insulin aspart U-100 pen 0-5 Units    [DISCONTINUED] labetaloL injection 10 mg    [DISCONTINUED] metoprolol tartrate (LOPRESSOR) tablet 50 mg    [DISCONTINUED] metoprolol tartrate (LOPRESSOR) tablet 50 mg    [DISCONTINUED] mupirocin 2 % ointment    [DISCONTINUED] ondansetron injection 4 mg    [DISCONTINUED] prochlorperazine injection Soln 5 mg    [DISCONTINUED] senna-docusate 8.6-50 mg per tablet 1 tablet    [DISCONTINUED] sodium chloride 0.9% flush 10 mL     Current Outpatient Medications on File Prior to Encounter   Medication Sig    acetaminophen (TYLENOL) 650 MG TbSR Take 1 tablet (650 mg total) by mouth 3 (three) times daily as needed.    albuterol (PROVENTIL/VENTOLIN HFA) 90 mcg/actuation inhaler INHALE 2 PUFFS INTO THE LUNGS EVERY 6 HOURS AS NEEDED WHEEZING RESCUE    apixaban (ELIQUIS) 2.5 mg Tab Take 1 tablet (2.5 mg total) by mouth 2 (two) times daily.    aspirin 81 MG Chew Take 1 tablet (81 mg total) by mouth once daily.    baclofen (LIORESAL) 5 mg Tab tablet Take 2 tablets (10 mg total) by mouth after lunch for 3 days, THEN 1 tablet (5 mg total) after lunch for 2 days, THEN 0.5 tablets (2.5 mg total) after lunch for 2 days.    benzonatate (TESSALON) 200 MG capsule Take 1 capsule (200 mg total) by mouth 3 (three) times daily as needed for Cough.    blood pressure test kit-large Kit 1 kit by Misc.(Non-Drug; Combo Route) route 2 (two) times daily as needed.    blood sugar diagnostic (TRUE METRIX GLUCOSE TEST STRIP) Strp Check BS once daily    blood-glucose meter (TRUE METRIX GLUCOSE METER) kit Use to check BS once daily    diltiaZEM (CARDIZEM CD) 180 MG 24 hr capsule Take 1 capsule (180 mg total) by mouth once daily.    fluticasone propionate (FLONASE) 50 mcg/actuation nasal spray 1 spray to each nostril twice daily for 7 days, then may continue 1 spray to each nostril ONCE daily as needed for sinus congestion.    furosemide (LASIX) 20 MG tablet Take 1  tablet (20 mg total) by mouth daily as needed (edema).    hydroxychloroquine (PLAQUENIL) 200 mg tablet Take 1 tablet (200 mg total) by mouth 2 (two) times daily.    lancets (LANCETS,THIN) Misc Check BS once daily    lisinopriL 10 MG tablet Take 1 tablet (10 mg total) by mouth every evening. for 120 doses    metoprolol succinate (TOPROL-XL) 50 MG 24 hr tablet Take 1 tablet (50 mg total) by mouth 2 (two) times daily.    nystatin (MYCOSTATIN) cream Apply topically 2 (two) times daily.    nystatin (MYCOSTATIN) powder Apply topically 4 (four) times daily.    polyethylene glycol (GLYCOLAX) 17 gram/dose powder Take 17 g by mouth once daily.    triamcinolone acetonide 0.1% (KENALOG) 0.1 % cream Apply topically 2 (two) times daily. for 7 days     Family History       Problem Relation (Age of Onset)    Arthritis Father    Asthma Daughter    Cancer Father, Sister    Diabetes Sister    Heart disease Sister, Brother    Leukemia Mother          Tobacco Use    Smoking status: Never     Passive exposure: Never    Smokeless tobacco: Never   Substance and Sexual Activity    Alcohol use: No     Alcohol/week: 0.0 standard drinks of alcohol    Drug use: No    Sexual activity: Not Currently     Partners: Male     Birth control/protection: Surgical     Review of Systems   Unable to perform ROS: Mental status change     Objective:     Vital Signs (Most Recent):  Temp: 98 °F (36.7 °C) (02/03/25 1305)  Pulse: (!) 116 (02/03/25 1305)  Resp: 16 (02/03/25 1305)  BP: 117/75 (02/03/25 1305)  SpO2: 100 % (02/03/25 1305) Vital Signs (24h Range):  Temp:  [98 °F (36.7 °C)-98.4 °F (36.9 °C)] 98 °F (36.7 °C)  Pulse:  [110-250] 116  Resp:  [0-48] 16  SpO2:  [69 %-100 %] 100 %  BP: (114-189)/() 117/75     Weight: 51.8 kg (114 lb 3.2 oz)  Body mass index is 23.07 kg/m².     Physical Exam  Constitutional:       General: She is in acute distress.      Appearance: She is ill-appearing and toxic-appearing.      Comments: Very cachectic   HENT:       Head: Normocephalic and atraumatic.      Nose: Nose normal.      Mouth/Throat:      Mouth: Mucous membranes are dry.   Cardiovascular:      Rate and Rhythm: Tachycardia present. Rhythm irregular.   Pulmonary:      Effort: Pulmonary effort is normal.      Breath sounds: Normal breath sounds.   Abdominal:      General: Abdomen is flat.      Palpations: Abdomen is soft.   Musculoskeletal:         General: No swelling or tenderness.      Cervical back: Normal range of motion.      Right lower leg: No edema.      Left lower leg: No edema.   Skin:     General: Skin is dry.      Capillary Refill: Capillary refill takes less than 2 seconds.      Coloration: Skin is pale.   Neurological:      Mental Status: She is disoriented.                Significant Labs: All pertinent labs within the past 24 hours have been reviewed.  Recent Lab Results  (Last 5 results in the past 24 hours)        02/03/25  1353   02/03/25  1244   02/03/25  0814   02/03/25  0537   02/03/25  0536        Allens Test         N/A       Ammonia 28               PTT 26.5  Comment: Refer to local heparin nomogram for intensity/dose specific   therapeutic   range.  LOT^050^APTT [id number]                 Baso # 0.03               Basophil % 0.4                 Comment: Values of less than 100 pg/ml are consistent with non-CHF populations.               Site         Other       Calcium Level Ionized 1.12               DelSys         Nasal Can       Differential Method Automated               Eos # 0.1               Eos % 0.6               Gran # (ANC) 6.5               Gran % 75.9               Hematocrit 37.4                37.4               Hemoglobin 12.0                12.0               Immature Grans (Abs) 0.02  Comment: Mild elevation in immature granulocytes is non specific and   can be seen in a variety of conditions including stress response,   acute inflammation, trauma and pregnancy. Correlation with other   laboratory and clinical findings  is essential.                 Immature Granulocytes 0.2               INR 1.2  Comment: Coumadin Therapy:  2.0 - 3.0 for INR for all indicators except mechanical heart valves  and antiphospholipid syndromes which should use 2.5 - 3.5.  LOT^040^PT Inn^[id number]                 Lactic Acid Level 1.7  Comment: Falsely low lactic acid results can be found in samples   containing >=13.0 mg/dL total bilirubin and/or >=3.5 mg/dL   direct bilirubin.                 Lymph # 1.4               Lymph % 16.1               MCH 28.7                28.7               MCHC 32.1                32.1               MCV 90                90               Mono # 0.6               Mono % 6.8               MPV 10.3                10.3               nRBC 0               Platelet Count 253                253               POC BE         -1       POC HCO3         22.9       POC PCO2         31.0       POC PH         7.477       POC PO2         31       POC SATURATED O2         65       POC TCO2         24       POCT Glucose   102   126   121         PT 13.1               RBC 4.18                4.18               RDW 15.5                15.5               Sample         VENOUS       Troponin I 0.218  Comment: The reference interval for Troponin I represents the 99th percentile   cutoff   for our facility and is consistent with 3rd generation assay   performance.                 WBC 8.50                8.50                                      Significant Imaging: I have reviewed all pertinent imaging results/findings within the past 24 hours.      Echo Saline Bubble? Yes; Ultrasound enhancing contrast? Yes    Result Date: 1/30/2025    Left Ventricle: The left ventricle is normal in size. Mildly increased   wall thickness. Global hypokinesis present. There is severely reduced   systolic function. Quantitated ejection fraction is 28%. Unable to assess   diastolic function due to atrial fibrillation.    Right Ventricle: Right ventricular  enlargement. Systolic function is   reduced.    Left Atrium: Left atrium is severely dilated. Agitated saline study of   the atrial septum is negative after vasalva maneuver, suggesting absence   of intracardiac shunt at the atrial level.    Right Atrium: Right atrium is dilated.    Mitral Valve: There is moderate regurgitation.  EROA 0.31 cm2    Tricuspid Valve: There is moderate regurgitation.    Pulmonary Artery: There is moderate pulmonary hypertension. The   estimated pulmonary artery systolic pressure is 60 mmHg.    IVC/SVC: Intermediate venous pressure at 8 mmHg.     EXAMINATION:  CTA HEAD AND NECK (XPD)     CLINICAL HISTORY:  Neuro deficit, acute, stroke suspected;Stroke/TIA, determine embolic source;     TECHNIQUE:  Thin section post IV contrast images were obtained through the cerebral vasculature.  Multiplanar MIP reformations were performed.  Iterative reconstruction technique was used.     CT/cardiac nuclear exam/s in prior 12 months:  1     COMPARISON:  Head CT 01/28/2025.     FINDINGS:  Aortic arch: Three-vessel arch with mild plaque.  Origins of the great vessels patent     Right carotid: No significant plaque.  Patent ICA.  Patent CCA.     Left carotid: No significant plaque.  Patent ICA.  Patent CCA.     Vertebrals: Patent.  Left dominant.     Anterior circulation: Anterior cerebral arteries patent.  Occlusion M2 branch supplying the right anterior temporal lobe.     Posterior circulation: Basilar and posterior cerebral arteries patent.     No evidence of neck mass or lymph node enlargement.  Small bilateral pleural fluid collections.     Impression:     1. Occlusion of the right M2 branch supplying the anterior temporal lobe.  2. Small bilateral pleural fluid collections.   MRI BRAIN WITHOUT CONTRAST     CLINICAL HISTORY:  Stroke, follow up;     TECHNIQUE:  Brain studied using sagittal and axial T1, axial T2, FLAIR and DW Images.     COMPARISON:  CT head 01/28/2025     FINDINGS:  Areas of acute  infarction are evident the largest of which right MCA distribution involving the anterior right perisylvian and lateral right frontal regions measuring approximately 4.9 x 2.9 cm and smaller lateral right parietooccipital cortical infarct measuring approximately 2.6 x 2.1 cm as seen on the prior CT.  Associated localized mass effect again noted including effacement of the adjacent portions ipsilateral right sylvian fissure and overlying cortical sulci.     Small possibly subacute or chronic posterior left frontal subcortical infarct measuring approximately 1.5 cm with small focus involvement of the overlying cortex noted.  Additional nonspecific T2 hyperintensities white matter may reflect moderate microvascular ischemic change.  Old lacunar infarcts and or incidental prominent perivascular spaces bilateral basal ganglia and thalami.  Presumed microvascular ischemic change central nage.     Visualized paranasal sinuses are clear as are mastoid air cells.     Impression:     Areas of acute infarction right MCA distribution largest involving the right perisylvian and lateral right frontal regions measuring 4.9 x 2.9 cm and smaller lateral right parietooccipital cortical infarct measuring 2.6 x 2.1 cm with associated localized mass effect including effacement of adjacent portions right sylvian fissure and overlying cortical sulci.     Nonacute posterior left frontal subcortical white matter infarct with small focus overlying cortical involvement.     Moderate presumed microvascular ischemic change white matter.   CT HEAD WITHOUT CONTRAST     CLINICAL HISTORY:  Stroke, follow up;     TECHNIQUE:  Low dose axial images were obtained through the head.  Coronal and sagittal reformations were also performed. Contrast was not administered.     COMPARISON:  Same day CT and MR     FINDINGS:  Intracranial compartment:     No hydrocephalus.  No extra-axial blood or fluid collections.     Redemonstrated acute right MCA infarct.   Background of moderate chronic microvascular ischemia.  No acute intracranial hemorrhage.     Skull/extracranial contents (limited evaluation): No fracture. Mastoid air cells and paranasal sinuses are essentially clear.     Impression:     Acute right MCA infarct.  No acute intracranial hemorrhage.        Electronically signed by:Derrick Magdaleno  Date:                                            01/30/2025  Time:                                           17:29  Assessment/Plan:     Acute on chronic heart failure  Cardiology was consulted currently patient is on esmolol drip.  Beta blockers scheduled  Patient is a euvolemic  Patient received loading dose of digoxin for AFib with RVR uncontrolled  Echo Saline Bubble? Yes; Ultrasound enhancing contrast? Yes    Result Date: 1/30/2025    Left Ventricle: The left ventricle is normal in size. Mildly increased   wall thickness. Global hypokinesis present. There is severely reduced   systolic function. Quantitated ejection fraction is 28%. Unable to assess   diastolic function due to atrial fibrillation.    Right Ventricle: Right ventricular enlargement. Systolic function is   reduced.    Left Atrium: Left atrium is severely dilated. Agitated saline study of   the atrial septum is negative after vasalva maneuver, suggesting absence   of intracardiac shunt at the atrial level.    Right Atrium: Right atrium is dilated.    Mitral Valve: There is moderate regurgitation.  EROA 0.31 cm2    Tricuspid Valve: There is moderate regurgitation.    Pulmonary Artery: There is moderate pulmonary hypertension. The   estimated pulmonary artery systolic pressure is 60 mmHg.    IVC/SVC: Intermediate venous pressure at 8 mmHg.         Acute encephalopathy  2/3 at [facility] sent for stat CT head  -TSH T4 ammonia levels  -a neurology consulted      Cerebrovascular accident (CVA) due to embolism of right middle cerebral artery  Presented on January 28, 2025 with large stroke, patient was  not a candidate for tPA versus thrombectomy  Tele stroke was consulted.  The all anticoagulants to be started after in 7 days on February 4th  Neurology consulted at [facility] after arrival on 02/03       Antithrombotics for secondary stroke prevention: Antiplatelets: None:  Large size stroke on 01/28 patient to wait total of 7 days to restart anticoagulants on 2/for    Statins for secondary stroke prevention and hyperlipidemia, if present:   Statins: Atorvastatin- 40 mg daily    Aggressive risk factor modification: HTN, DM, A-Fib, CAD     Rehab efforts: The patient has been evaluated by a stroke team provider and the therapy needs have been fully considered based off the presenting complaints and exam findings. The following therapy evaluations are needed: PT evaluate and treat, OT evaluate and treat, SLP evaluate and treat    Diagnostics ordered/pending: Carotid ultrasound to assess vasculature, CT scan of head without contrast to asses brain parenchyma, CTA Head to assess vasculature , HgbA1C to assess blood glucose levels, Lipid Profile to assess cholesterol levels, MRA head to assess vasculature, MRA neck/arch to assess vasculature, MRI head without contrast to assess brain parenchyma, TTE to assess cardiac function/status , TSH to assess thyroid function    VTE prophylaxis: None: Reason for No Pharmacological VTE Prophylaxis:  Due to large stroke per Neurology recommendation had [facility] patient to be started after 7 days (2/4    BP parameters: Infarct:  Out of the window at this moment to maintain blood pressure within normal values    Stroke due to occlusion of right middle cerebral artery  Presented on January 28, 2025 with large stroke, patient was not a candidate for tPA versus thrombectomy  Tele stroke was consulted.  The all anticoagulants to be started after in 7 days on February 4th  Neurology consulted at [facility] after arrival on 02/03       Antithrombotics for secondary  stroke prevention: Antiplatelets: None:  Large size stroke on 01/28 patient to wait total of 7 days to restart anticoagulants on 2/for    Statins for secondary stroke prevention and hyperlipidemia, if present:   Statins: Atorvastatin- 40 mg daily    Aggressive risk factor modification: HTN, DM, A-Fib, CAD     Rehab efforts: The patient has been evaluated by a stroke team provider and the therapy needs have been fully considered based off the presenting complaints and exam findings. The following therapy evaluations are needed: PT evaluate and treat, OT evaluate and treat, SLP evaluate and treat    Diagnostics ordered/pending: Carotid ultrasound to assess vasculature, CT scan of head without contrast to asses brain parenchyma, CTA Head to assess vasculature , HgbA1C to assess blood glucose levels, Lipid Profile to assess cholesterol levels, MRA head to assess vasculature, MRA neck/arch to assess vasculature, MRI head without contrast to assess brain parenchyma, TTE to assess cardiac function/status , TSH to assess thyroid function    VTE prophylaxis: None: Reason for No Pharmacological VTE Prophylaxis:  Due to large stroke per Neurology recommendation had [facility] patient to be started after 7 days (2/4    BP parameters: Infarct:  Out of the window at this moment to maintain blood pressure within normal values        Atrial fibrillation with RVR  Patient has paroxysmal (<7 days) atrial fibrillation. Patient is currently in atrial fibrillation. [id number] Score: 5. The patients heart rate in the last 24 hours is as follows:  Pulse  Min: 110  Max: 250     Antiarrhythmics  esmolol 2000 mg in sodium chloride 0.9% 100 mL (20 mg/mL), Continuous, Intravenous  metoprolol tartrate (LOPRESSOR) tablet 50 mg, Every 6 hours, Oral    Anticoagulants       Plan  - Replete lytes with a goal of K>4, Mg >2  - Patient is not anticoagulated due to recent history of massive stroke, after clearance from Neurology can consider  starting patient on anticoagulated on 2/for  - Patient's afib is currently uncontrolled. Will adjust treatment as follows:  Cardiology consulted currently on esmolol drip, digoxin level than load with digoxin          Essential hypertension  Patient's blood pressure range in the last 24 hours was: BP  Min: 114/94  Max: 189/116.The patient's inpatient anti-hypertensive regimen is listed below:  Current Antihypertensives  esmolol 2000 mg in sodium chloride 0.9% 100 mL (20 mg/mL), Continuous, Intravenous  labetaloL injection 10 mg, Every 6 hours PRN, Intravenous  metoprolol tartrate (LOPRESSOR) tablet 50 mg, Every 6 hours, Oral    Plan  - BP is controlled, no changes needed to their regimen  - controlled current medications are to control AFib RVR    Gastroesophageal reflux disease  On famotidine       T2DM (type 2 diabetes mellitus)  Patient's FSGs are controlled on current medication regimen.  Last A1c reviewed-   Lab Results   Component Value Date    HGBA1C 5.7 (H) 01/29/2025     Most recent fingerstick glucose reviewed-   Recent Labs   Lab 02/03/25  0300 02/03/25  0537 02/03/25  0814 02/03/25  1244   POCTGLUCOSE 137* 121* 126* 102     Current correctional scale  Low  Maintain anti-hyperglycemic dose as follows-   Antihyperglycemics (From admission, onward)      Start     Stop Route Frequency Ordered    02/03/25 1413  insulin aspart U-100 pen 0-5 Units         -- SubQ Every 6 hours PRN 02/03/25 1313          Hold Oral hypoglycemics while patient is in the hospital.      VTE Risk Mitigation (From admission, onward)           Ordered     IP VTE HIGH RISK PATIENT  Once         02/03/25 1250     Place sequential compression device  Until discontinued         02/03/25 1250                  Critical care time spent on the evaluation and treatment of severe organ dysfunction, review of pertinent labs and imaging studies, discussions with consulting providers and discussions with patient/family: 45 minutes.              Pharmacist Renal Dose Adjustment Note    Eneida Majano is a 84 y.o. female being treated with the medication famotidine     Patient Data:    Vital Signs (Most Recent):    Vital Signs (72h Range):  Temp:  [97.1 °F (36.2 °C)-98.4 °F (36.9 °C)]   Pulse:  []   Resp:  [0-48]   BP: (116-189)/()   SpO2:  [69 %-100 %]      Recent Labs   Lab 01/31/25  0520 02/02/25  0525 02/03/25  0533   CREATININE 0.7 0.8 0.8     Serum creatinine: 0.8 mg/dL 02/03/25 0533  Estimated creatinine clearance: 40.3 mL/min    Medication:famotidine dose: 20 mg frequency Q 12 hrs will be changed to medication:famotidine dose:20 mg frequency:Q 24 hrs     Pharmacist's Name: Jessie Huff  Pharmacist's Extension: [id number]      Fozia Avila MD  Department of Hospital Medicine  [facility] - Intensive [facility]

## 2025-02-03 NOTE — HOSPITAL COURSE
02/03/2025 Per HPI   02/04/2025 HR 90s-120s on Esmolol and Digoxin IV. Patient unable to tolerate p.o. or follow commands.   02/05/2025 Hemodynamically stable, HR improved. NGT in place. Patient on BB, digoxin and weaning Esmolol gtt. T max 100.7 this AM. Family at bedside updated on POC.

## 2025-02-03 NOTE — ASSESSMENT & PLAN NOTE
Patient has paroxysmal (<7 days) atrial fibrillation. Patient is currently in atrial fibrillation. [id number] Score: 5. The patients heart rate in the last 24 hours is as follows:  Pulse  Min: 110  Max: 250     Antiarrhythmics  esmolol 2000 mg in sodium chloride 0.9% 100 mL (20 mg/mL), Continuous, Intravenous  metoprolol tartrate (LOPRESSOR) tablet 50 mg, Every 6 hours, Oral    Anticoagulants       Plan  - Replete lytes with a goal of K>4, Mg >2  - Patient is not anticoagulated due to recent history of massive stroke, after clearance from Neurology can consider starting patient on anticoagulated on 2/for  - Patient's afib is currently uncontrolled. Will adjust treatment as follows:  Cardiology consulted currently on esmolol drip, digoxin level than load with digoxin

## 2025-02-03 NOTE — SUBJECTIVE & OBJECTIVE
Past Medical History:   Diagnosis Date    Acid reflux     Anemia     Arthritis     Atrial fibrillation     Carpal tunnel syndrome     Cataract     Dry mouth     GERD (gastroesophageal reflux disease)     Hyperlipidemia     Hypertension     Insomnia     Other osteoporosis without current pathological fracture 3/14/2018    PONV (postoperative nausea and vomiting)     short term       Past Surgical History:   Procedure Laterality Date    APPENDECTOMY      CARDIOVERSION N/A 2019    Procedure: Cardioversion;  Surgeon: Emmanuel Matthew MD;  Location: [facility] CATH LAB;  Service: Cardiology;  Laterality: N/A;    COLONOSCOPY      COLONOSCOPY N/A 2020    Procedure: COLONOSCOPY;  Surgeon: Gopi Brooke MD;  Location: [facility] ENDO;  Service: Endoscopy;  Laterality: N/A;    COLONOSCOPY N/A 2020    Procedure: COLONOSCOPY;  Surgeon: Gopi Brooke MD;  Location: [facility] ENDO;  Service: Endoscopy;  Laterality: N/A;    ESOPHAGOGASTRODUODENOSCOPY N/A 2020    Procedure: EGD (ESOPHAGOGASTRODUODENOSCOPY);  Surgeon: Gopi Brooke MD;  Location: [facility];  Service: Endoscopy;  Laterality: N/A;    FOOT SURGERY Left     HAND SURGERY      HERNIA REPAIR      x2    HYSTERECTOMY  age 32    fibroids    OOPHORECTOMY         Review of patient's allergies indicates:   Allergen Reactions    Asa [aspirin] Nausea Only       Current Facility-Administered Medications on File Prior to Encounter   Medication    [COMPLETED] esmoloL injection 24,000 mcg    [COMPLETED] esmoloL injection 24,000 mcg    [COMPLETED] esmoloL injection 24,000 mcg    [] lactated ringers infusion    [COMPLETED] metoprolol injection 5 mg    [DISCONTINUED] acetaminophen suppository 650 mg    [DISCONTINUED] acetaminophen tablet 650 mg    [DISCONTINUED] atorvastatin tablet 40 mg    [DISCONTINUED] bisacodyL suppository 10 mg    [DISCONTINUED] calcium carbonate 200 mg calcium (500 mg) chewable tablet 500 mg    [DISCONTINUED] clopidogreL tablet 75  mg    [DISCONTINUED] dextrose 50% injection 12.5 g    [DISCONTINUED] electrolytes-dextrose (Pedialyte) oral solution 200 mL    [DISCONTINUED] enoxaparin injection 30 mg    [DISCONTINUED] esmolol 2000 mg in sodium chloride 0.9% 100 mL (20 mg/mL)    [DISCONTINUED] glucagon (human recombinant) injection 1 mg    [DISCONTINUED] haloperidol lactate injection 1 mg    [DISCONTINUED] hydroxychloroquine tablet 200 mg    [DISCONTINUED] hydrOXYzine pamoate capsule 25 mg    [DISCONTINUED] insulin aspart U-100 pen 0-5 Units    [DISCONTINUED] labetaloL injection 10 mg    [DISCONTINUED] metoprolol tartrate (LOPRESSOR) tablet 50 mg    [DISCONTINUED] metoprolol tartrate (LOPRESSOR) tablet 50 mg    [DISCONTINUED] mupirocin 2 % ointment    [DISCONTINUED] ondansetron injection 4 mg    [DISCONTINUED] prochlorperazine injection Soln 5 mg    [DISCONTINUED] senna-docusate 8.6-50 mg per tablet 1 tablet    [DISCONTINUED] sodium chloride 0.9% flush 10 mL     Current Outpatient Medications on File Prior to Encounter   Medication Sig    acetaminophen (TYLENOL) 650 MG TbSR Take 1 tablet (650 mg total) by mouth 3 (three) times daily as needed.    albuterol (PROVENTIL/VENTOLIN HFA) 90 mcg/actuation inhaler INHALE 2 PUFFS INTO THE LUNGS EVERY 6 HOURS AS NEEDED WHEEZING RESCUE    apixaban (ELIQUIS) 2.5 mg Tab Take 1 tablet (2.5 mg total) by mouth 2 (two) times daily.    aspirin 81 MG Chew Take 1 tablet (81 mg total) by mouth once daily.    baclofen (LIORESAL) 5 mg Tab tablet Take 2 tablets (10 mg total) by mouth after lunch for 3 days, THEN 1 tablet (5 mg total) after lunch for 2 days, THEN 0.5 tablets (2.5 mg total) after lunch for 2 days.    benzonatate (TESSALON) 200 MG capsule Take 1 capsule (200 mg total) by mouth 3 (three) times daily as needed for Cough.    blood pressure test kit-large Kit 1 kit by Misc.(Non-Drug; Combo Route) route 2 (two) times daily as needed.    blood sugar diagnostic (TRUE METRIX GLUCOSE TEST STRIP) Strp Check BS once  daily    blood-glucose meter (TRUE METRIX GLUCOSE METER) kit Use to check BS once daily    diltiaZEM (CARDIZEM CD) 180 MG 24 hr capsule Take 1 capsule (180 mg total) by mouth once daily.    fluticasone propionate (FLONASE) 50 mcg/actuation nasal spray 1 spray to each nostril twice daily for 7 days, then may continue 1 spray to each nostril ONCE daily as needed for sinus congestion.    furosemide (LASIX) 20 MG tablet Take 1 tablet (20 mg total) by mouth daily as needed (edema).    hydroxychloroquine (PLAQUENIL) 200 mg tablet Take 1 tablet (200 mg total) by mouth 2 (two) times daily.    lancets (LANCETS,THIN) Misc Check BS once daily    lisinopriL 10 MG tablet Take 1 tablet (10 mg total) by mouth every evening. for 120 doses    metoprolol succinate (TOPROL-XL) 50 MG 24 hr tablet Take 1 tablet (50 mg total) by mouth 2 (two) times daily.    nystatin (MYCOSTATIN) cream Apply topically 2 (two) times daily.    nystatin (MYCOSTATIN) powder Apply topically 4 (four) times daily.    polyethylene glycol (GLYCOLAX) 17 gram/dose powder Take 17 g by mouth once daily.    triamcinolone acetonide 0.1% (KENALOG) 0.1 % cream Apply topically 2 (two) times daily. for 7 days     Family History       Problem Relation (Age of Onset)    Arthritis Father    Asthma Daughter    Cancer Father, Sister    Diabetes Sister    Heart disease Sister, Brother    Leukemia Mother          Tobacco Use    Smoking status: Never     Passive exposure: Never    Smokeless tobacco: Never   Substance and Sexual Activity    Alcohol use: No     Alcohol/week: 0.0 standard drinks of alcohol    Drug use: No    Sexual activity: Not Currently     Partners: Male     Birth control/protection: Surgical     Review of Systems   Unable to perform ROS: Other     Objective:     Vital Signs (Most Recent):    Vital Signs (24h Range):  Temp:  [98 °F (36.7 °C)-98.4 °F (36.9 °C)] 98.3 °F (36.8 °C)  Pulse:  [110-250] 138  Resp:  [0-48] 11  SpO2:  [69 %-100 %] 99 %  BP:  "(114-189)/() 119/96     Weight: 51.8 kg (114 lb 3.2 oz)  Body mass index is 23.07 kg/m².            No intake or output data in the 24 hours ending 02/03/25 1351    Lines/Drains/Airways       Drain  Duration             Female External Urinary Catheter w/ Suction 02/03/25 0600 <1 day              Peripheral Intravenous Line  Duration                  Peripheral IV - Single Lumen 02/02/25 0634 22 G Posterior;Right Hand 1 day         Peripheral IV - Single Lumen 02/03/25 0530 20 G Anterior;Left Forearm <1 day                     Physical Exam  Constitutional:       General: She is not in acute distress.     Appearance: She is not diaphoretic.   Eyes:      General:         Right eye: No discharge.         Left eye: No discharge.   Cardiovascular:      Rate and Rhythm: Tachycardia present. Rhythm irregular.      Heart sounds: Murmur heard.   Neurological:      Mental Status: She is alert.          Significant Labs: BMP:   Recent Labs   Lab 02/02/25  0525 02/03/25  0533   * 125*    138   K 3.3* 4.1    108   CO2 23 19*   BUN 12 13   CREATININE 0.8 0.8   CALCIUM 9.2 9.2   MG 1.9 1.9   , CMP   Recent Labs   Lab 02/02/25  0525 02/03/25  0533    138   K 3.3* 4.1    108   CO2 23 19*   * 125*   BUN 12 13   CREATININE 0.8 0.8   CALCIUM 9.2 9.2   ALBUMIN 2.8* 2.9*   ANIONGAP 10 11   , CBC   Recent Labs   Lab 02/02/25  0525 02/03/25  0534   WBC 5.01 7.04   HGB 12.7 12.8   HCT 38.5 39.8    256   , INR No results for input(s): "INR", "PROTIME" in the last 48 hours., Lipid Panel No results for input(s): "CHOL", "HDL", "LDLCALC", "TRIG", "CHOLHDL" in the last 48 hours., Troponin No results for input(s): "TROPONINIHS" in the last 48 hours., and All pertinent lab results from the last 24 hours have been reviewed.    Significant Imaging: Echocardiogram: Transthoracic echo (TTE) complete (Cupid Only):   Results for orders placed or performed during the hospital encounter of 01/29/25 "   Echo Saline Bubble? Yes; Ultrasound enhancing contrast? Yes   Result Value Ref Range    BSA 1.41 m2    Martin's Biplane MOD Ejection Fraction 28 %    A2C EF 26 %    A4C EF 32 %    LVOT stroke volume 35.1 cm3    LVIDd 3.4 (A) 3.5 - 6.0 cm    LV Systolic Volume 24.71 mL    LV Systolic Volume Index 17.5 mL/m2    LVIDs 2.6 2.1 - 4.0 cm    LV ESV A2C 79.54 mL    LV Diastolic Volume 47.31 mL    LV ESV A4C 56.20 mL    LV Diastolic Volume Index 33.55 mL/m2    LV EDV A2C [id number] mL    LV EDV A4C 31.71 mL    Left Ventricular End Systolic Volume by Teichholz Method 24.71 mL    Left Ventricular End Diastolic Volume by Teichholz Method 47.31 mL    IVS 1.1 0.6 - 1.1 cm    LVOT diameter 1.9 cm    LVOT area 2.8 cm2    FS 23.5 (A) 28 - 44 %    Left Ventricle Relative Wall Thickness 0.65 cm    PW 1.1 0.6 - 1.1 cm    LV mass 114.0 g    LV Mass Index 80.9 g/m2    MV Peak E Slick 1.15 m/s    TDI LATERAL 0.09 m/s    TDI SEPTAL 0.07 m/s    E/E' ratio 14 m/s    MV Peak A Slick 0.43 m/s    TR Max Slick 3.6 m/s    E/A ratio 2.67     E wave deceleration time 149 msec    LV SEPTAL E/E' RATIO 16.4 m/s    LV LATERAL E/E' RATIO 12.8 m/s    LVOT peak slick 0.6 m/s    Left Ventricular Outflow Tract Mean Velocity 0.40 cm/s    Left Ventricular Outflow Tract Mean Gradient 0.70 mmHg    RV- pimentel basal diam 2.2 cm    RV S' 8.81 cm/s    TAPSE 1.19 cm    RV/LV Ratio 0.65 cm    LA size 4.1 cm    Left Atrium Minor Axis 4.5 cm    Left Atrium Major Axis 5.6 cm    LA Vol (MOD) 68 mL    MYRIAM (MOD) 48 mL/m2    RA area length vol 48.49 mL    RA Area 18.7 cm2    RA Major Axis 5.17 cm    RA Width 3.67 cm    RA Vol 51.53 mL    AV mean gradient 2 mmHg    AV peak gradient 3 mmHg    Ao peak slick 0.9 m/s    Ao VTI 20.1 cm    LVOT peak VTI 12.4 cm    AV valve area 1.7 cm²    AV Velocity Ratio 0.67     AV index (prosthetic) 0.62     MARGI by Velocity Ratio 1.9 cm²    Mr max slick 6.48 m/s    MV stenosis pressure 1/2 time 43.20 ms    MV valve area p 1/2 method 5.09 cm2     TV mean gradient 34 mmHg    Triscuspid Valve Regurgitation Peak Gradient 52 mmHg    PV PEAK VELOCITY 0.79 m/s    PV peak gradient 2 mmHg    Pulmonary Valve Mean Velocity 0.61 m/s    Sinus 2.53 cm    STJ 1.94 cm    Ascending aorta 2.61 cm    IVC diameter 1.92 cm    Mean e' 0.08 m/s    ZLVIDS -0.31     ZLVIDD -2.47     LA area A4C 19.98 cm2    LA area A2C 24.67 cm2    RVDD 2.15 cm    TV resting pulmonary artery pressure 60 mmHg    RV TB RVSP 12 mmHg    Est. RA pres 8 mmHg    Narrative      Left Ventricle: The left ventricle is normal in size. Mildly increased   wall thickness. Global hypokinesis present. There is severely reduced   systolic function. Quantitated ejection fraction is 28%. Unable to assess   diastolic function due to atrial fibrillation.    Right Ventricle: Right ventricular enlargement. Systolic function is   reduced.    Left Atrium: Left atrium is severely dilated. Agitated saline study of   the atrial septum is negative after vasalva maneuver, suggesting absence   of intracardiac shunt at the atrial level.    Right Atrium: Right atrium is dilated.    Mitral Valve: There is moderate regurgitation.  EROA 0.31 cm2    Tricuspid Valve: There is moderate regurgitation.    Pulmonary Artery: There is moderate pulmonary hypertension. The   estimated pulmonary artery systolic pressure is 60 mmHg.    IVC/SVC: Intermediate venous pressure at 8 mmHg.

## 2025-02-03 NOTE — NURSING
Single lumen 20G, 2.25IN AccuCath placed in the right basilic vein. Needle advanced into the vessel under real time ultrasound guidance.    Max dwell date: 2/13/25   Lot number: [id number]

## 2025-02-03 NOTE — ASSESSMENT & PLAN NOTE
Presented on January 28, 2025 with large stroke, patient was not a candidate for tPA versus thrombectomy  Tele stroke was consulted.  The all anticoagulants to be started after in 7 days on February 4th  Neurology consulted at [facility] after arrival on 02/03       Antithrombotics for secondary stroke prevention: Antiplatelets: None:  Large size stroke on 01/28 patient to wait total of 7 days to restart anticoagulants on 2/for    Statins for secondary stroke prevention and hyperlipidemia, if present:   Statins: Atorvastatin- 40 mg daily    Aggressive risk factor modification: HTN, DM, A-Fib, CAD     Rehab efforts: The patient has been evaluated by a stroke team provider and the therapy needs have been fully considered based off the presenting complaints and exam findings. The following therapy evaluations are needed: PT evaluate and treat, OT evaluate and treat, SLP evaluate and treat    Diagnostics ordered/pending: Carotid ultrasound to assess vasculature, CT scan of head without contrast to asses brain parenchyma, CTA Head to assess vasculature , HgbA1C to assess blood glucose levels, Lipid Profile to assess cholesterol levels, MRA head to assess vasculature, MRA neck/arch to assess vasculature, MRI head without contrast to assess brain parenchyma, TTE to assess cardiac function/status , TSH to assess thyroid function    VTE prophylaxis: None: Reason for No Pharmacological VTE Prophylaxis:  Due to large stroke per Neurology recommendation had [facility] patient to be started after 7 days (2/4    BP parameters: Infarct:  Out of the window at this moment to maintain blood pressure within normal values

## 2025-02-03 NOTE — CONSULTS
[facility]  Consult Note    Date of Service: 2/3/2025  Patient seen at the request of: Marco Fiore MD    Reason for Consultation  stroke    Assessment:  Eneida Majano is a 84 y.o. female who presents with stroke in the setting of atrial fibrillation.    Patient's right M2 occulusion stroke is suggestive of an embolic etiology, likely cardioembolic given the patient's history.  Patient was prescribed Eliquis for atrial fibrillation, however, there is some concern for non-compliance.      Exam today is limited by the patient's participation.  She is notably dysarthric and not following commands.  Query altered mental status due to secondary etiology.  No leukocytosis.  Swelling appears relatively minimal on CT head from 1/30.  Will follow up CTH to be done today.  Consider delirium as well.    Differential also includes focal seizure.  Recommend routine EEG.    TTE (bubble) - EF 28%, irregularly irregular rhythm    On planning for discharge, patient should be on a high-intensity statin and Eliquis, if non-compliance is established and there is no indication of DOAC failure.  Ok to restart DOAC 2-14 days after stroke.  Plan will change if patient is able to undergo more permanent intervention to her atrial fibrillation.        Plan:    - will follow up on repeat CT head  - routine EEG  - delirium precautions (minimize disruptions during sleeping hours, reorient frequently)      A dictation device was used to produce this document. Use of such devices sometimes results in grammatical errors or replacement of words that sound similarly.     Signed:    Bakari Martin MD  Neurology/Epilepsy  02/03/2025 2:51 PM      HPI:  Eneida Majano is a 84 y.o. female with   Past Medical History:   Diagnosis Date    Acid reflux     Anemia     Arthritis     Atrial fibrillation     Carpal tunnel syndrome     Cataract     Dry mouth     GERD (gastroesophageal reflux disease)     Hyperlipidemia     Hypertension      Insomnia     Other osteoporosis without current pathological fracture 3/14/2018    PONV (postoperative nausea and vomiting)     short term     Patient is unable to provide any additional history.  She does not follow commands and was taken for repeat CT head.      This is the extent of the patient's complaints at this time.    From discharge/transfer summary from transferring hospital 2/3/2025):  84-year-old female with a past medical history of anemia, atrial fibrillation/flutter on diltiazem and Eliquis at home, GERD, dyslipidemia, hypertension, and insomnia.     She now comes as a transfer from [facility] ED due to stroke like symptoms that started one day prior to presentation at 9 PM on 1/28/25.  Her symptoms were dysphagia, left-sided weakness, and right lateral gaze. Upon presentation to the ED, she was still symptomatic.  The MRI machine at [facility] is broken, so she is being transferred to [facility] for MRI capability.  She had a completed tele-stroke consult done in the ED (please refer to Dr. Bhatt's note).     The patient's family are at the bedside and provide all of the history, as Ms. Majano is very lethargic and cannot talk to me or cooperate with exam.  Even with the nurse shaking her, she just stares ahead without interacting or speaking.  The family tells me that yesterday around 3pm, she would not answer her grandson, who found her door locked as well.  They broke the door down and found her lying on the ground incontinent of urine.  She was able to ask for a cup of water, but not get up.  He picked her up and carried her to the chair and called 911.  She also would not speak at that time as well.    Hospital Course:   Patient admitted to telemetry floor and started on neurovascular protective medications and evaluated by Neurology.  Holding on anticoagulation at this time due to concerns for possible risk of hemorrhagic conversion and bleed.  Repeat CTA of the head recommended by Neurology to  evaluate for any bleed.  Patient lethargic and poorly responsive when seen by medical team but by afternoon, PT/OT was working with patient and recommendations for high intensity therapy appreciated.  Case management consulted for assistance in placement.     1/31:  Patient's mental status greatly improved but still having significant lower extremity weakness.  Working with PT/OT and recommendations for tight intensity therapy appreciated.  CTA of the head shows no signs of acute bleed or hemorrhagic conversion.  Will plan on resuming anticoagulation on day 7 as per neurology recommendations.  Will start patient on Plavix and hold ASA for now.  Continue atorvastatin and transition from IV to p.o. metoprolol.  Awaiting SNF/rehab placement.     2/3:  Patient went into AFib with RVR overnight.  Echocardiogram shows reduced ejection fraction of 26%.  Cardiology recommended initiation of esmolol GTT and patient transferred to ICU for further evaluation and management of AFib with RVR.  Patient remains tachycardic despite maximum as well dose, blood pressure is within normal limits at this time.  Patient's mental status unchanged and remains severely confused, responds poorly to questions and commands, and still has significant focal neurological deficits.  After further discussion with Cardiology, recommendation to transfer to higher level care were Cardiology will be available to assist in management.  Patient breathing comfortably on 2 L O2 via nasal cannula.  Patient is stable for transfer via EMS to [facility] for higher level of care and access to Interventional Cardiology.       TSH   Date Value Ref Range Status   02/03/2025 3.156 0.400 - 4.000 uIU/mL Final   01/28/2025 1.451 0.400 - 4.000 uIU/mL Final     Hemoglobin A1C   Date Value Ref Range Status   01/29/2025 5.7 (H) 4.0 - 5.6 % Final     Comment:     ADA Screening Guidelines:  5.7-6.4%  Consistent with prediabetes  >or=6.5%  Consistent with diabetes    High  levels of fetal hemoglobin interfere with the HbA1C  assay. Heterozygous hemoglobin variants (HbS, HgC, etc)do  not significantly interfere with this assay.   However, presence of multiple variants may affect accuracy.     10/01/2024 5.8 (H) 4.0 - 5.6 % Final     Comment:     ADA Screening Guidelines:  5.7-6.4%  Consistent with prediabetes  >or=6.5%  Consistent with diabetes    High levels of fetal hemoglobin interfere with the HbA1C  assay. Heterozygous hemoglobin variants (HbS, HgC, etc)do  not significantly interfere with this assay.   However, presence of multiple variants may affect accuracy.           Review of Systems:  ROS negative unless noted in HPI    Past Surgical History:  Past Surgical History:   Procedure Laterality Date    APPENDECTOMY      CARDIOVERSION N/A 2/8/2019    Procedure: Cardioversion;  Surgeon: Emmanuel Matthew MD;  Location: [facility] CATH LAB;  Service: Cardiology;  Laterality: N/A;    COLONOSCOPY      COLONOSCOPY N/A 5/28/2020    Procedure: COLONOSCOPY;  Surgeon: Gopi Brooke MD;  Location: [facility];  Service: Endoscopy;  Laterality: N/A;    COLONOSCOPY N/A 7/7/2020    Procedure: COLONOSCOPY;  Surgeon: Gopi Brooke MD;  Location: [facility];  Service: Endoscopy;  Laterality: N/A;    ESOPHAGOGASTRODUODENOSCOPY N/A 5/28/2020    Procedure: EGD (ESOPHAGOGASTRODUODENOSCOPY);  Surgeon: Gopi Brooke MD;  Location: [facility];  Service: Endoscopy;  Laterality: N/A;    FOOT SURGERY Left     HAND SURGERY      HERNIA REPAIR      x2    HYSTERECTOMY  age 32    fibroids    OOPHORECTOMY         Family History:  Family History   Problem Relation Name Age of Onset    Arthritis Father      Cancer Father          liver    Leukemia Mother      Diabetes Sister      Cancer Sister          throat    Heart disease Sister      Heart disease Brother      Asthma Daughter      Colon cancer Neg Hx         Social History:  Social History     Tobacco Use    Smoking status: Never     Passive  exposure: Never    Smokeless tobacco: Never   Substance Use Topics    Alcohol use: No     Alcohol/week: 0.0 standard drinks of alcohol    Drug use: No       Allergies:  Asa [aspirin]    Outpatient Medications:  Prior to Admission medications    Medication Sig Start Date End Date Taking? Authorizing Provider   acetaminophen (TYLENOL) 650 MG TbSR Take 1 tablet (650 mg total) by mouth 3 (three) times daily as needed. 3/14/19   Sylwia Barrera NP   albuterol (PROVENTIL/VENTOLIN HFA) 90 mcg/actuation inhaler INHALE 2 PUFFS INTO THE LUNGS EVERY 6 HOURS AS NEEDED WHEEZING RESCUE 5/9/23   Paula Cooper NP   apixaban (ELIQUIS) 2.5 mg Tab Take 1 tablet (2.5 mg total) by mouth 2 (two) times daily. 8/6/24   Paula Cooper NP   aspirin 81 MG Chew Take 1 tablet (81 mg total) by mouth once daily. 3/14/19 7/13/23  Sylwia Barrera NP   baclofen (LIORESAL) 5 mg Tab tablet Take 2 tablets (10 mg total) by mouth after lunch for 3 days, THEN 1 tablet (5 mg total) after lunch for 2 days, THEN 0.5 tablets (2.5 mg total) after lunch for 2 days. 10/29/24 11/5/24  Anthony Liao MD   benzonatate (TESSALON) 200 MG capsule Take 1 capsule (200 mg total) by mouth 3 (three) times daily as needed for Cough. 2/19/23   Andre Rubio NP   blood pressure test kit-large Kit 1 kit by Misc.(Non-Drug; Combo Route) route 2 (two) times daily as needed. 3/14/19   Sylwia Barrera NP   blood sugar diagnostic (TRUE METRIX GLUCOSE TEST STRIP) Strp Check BS once daily 11/15/24   Paula Cooper NP   blood-glucose meter (TRUE METRIX GLUCOSE METER) kit Use to check BS once daily 11/15/24 11/15/25  Paula Cooper NP   diltiaZEM (CARDIZEM CD) 180 MG 24 hr capsule Take 1 capsule (180 mg total) by mouth once daily. 1/28/25 1/28/26  Shadi Irwin MD   fluticasone propionate (FLONASE) 50 mcg/actuation nasal spray 1 spray to each nostril twice daily for 7 days, then may continue 1 spray to each nostril ONCE daily as needed for sinus  "congestion. 2/19/23   Andre Rubio NP   furosemide (LASIX) 20 MG tablet Take 1 tablet (20 mg total) by mouth daily as needed (edema). 10/3/24 10/3/25  Paula Cooper NP   hydroxychloroquine (PLAQUENIL) 200 mg tablet Take 1 tablet (200 mg total) by mouth 2 (two) times daily. 10/3/19   Billy Mahan MD   lancets (LANCETS,THIN) Misc Check BS once daily 11/15/24   Paula Cooper NP   lisinopriL 10 MG tablet Take 1 tablet (10 mg total) by mouth every evening. for 120 doses 10/29/24 2/26/25  Anthony Liao MD   metoprolol succinate (TOPROL-XL) 50 MG 24 hr tablet Take 1 tablet (50 mg total) by mouth 2 (two) times daily. 10/29/24 10/29/25  Anthony Liao MD   nystatin (MYCOSTATIN) cream Apply topically 2 (two) times daily. 10/3/24   Paula Cooper NP   nystatin (MYCOSTATIN) powder Apply topically 4 (four) times daily. 1/7/25   Paula Cooper NP   polyethylene glycol (GLYCOLAX) 17 gram/dose powder Take 17 g by mouth once daily. 2/3/22   Leona Boyer MD   triamcinolone acetonide 0.1% (KENALOG) 0.1 % cream Apply topically 2 (two) times daily. for 7 days 5/7/21 5/14/21  Paula Cooper NP       Physical exam:    Vitals: /75   Pulse (!) 116   Temp 98 °F (36.7 °C) (Axillary)   Resp 16   Ht 4' 11" (1.499 m)   Wt 51.8 kg (114 lb 3.2 oz)   SpO2 100%   BMI 23.07 kg/m²     General:   Sitting in chair, in no distress, well-nourished, well-developed, appears stated age.  Head/Neck:   Normocephalic,atraumatic  Pulm:  Non-labored breathing     Mental Status: Mild dysarthria, does not follow one step commands, irritable  CN:  II: blink to threat on OD  III, IV, VI: unable to completely assess  VII: Facial movement full and symmetric.   VIII: Hearing grossly normal to conversation.  IX, X: does not open mouth to command  Motor: Normal bulk throughout all four extremities.   LUE: does not participate with exam, spontaneous movement appreciated  RUE:  does not participate with exam, spontaneous movement " appreciated  LLE:  does not participate with exam, withdraws  RLE: does not participate with exam, withdraws  No tremors   Sensory: Withdraws to noxious stimulation x4 extremities  Reflexes: LUE: Biceps 2+ brachioradialis 2+  RUE: Biceps 2+, brachioradialis 2+  LLE: Knee 2+  RLE: Knee 2+  Coordination:  Unable to assess  Gait: bed bound    Imaging:  All pertinent imaging was personally reviewed.    On my personal review:   MRI shows right MCA distribution infarct (1/29), more recent CT (1/30) does not show significant unilateral swelling.      Results for orders placed during the hospital encounter of 01/28/25    MRI Brain Without Contrast    Narrative  EXAMINATION:  MRI BRAIN WITHOUT CONTRAST    CLINICAL HISTORY:  Stroke, follow up;    TECHNIQUE:  Brain studied using sagittal and axial T1, axial T2, FLAIR and DW Images.    COMPARISON:  CT head 01/28/2025    FINDINGS:  Areas of acute infarction are evident the largest of which right MCA distribution involving the anterior right perisylvian and lateral right frontal regions measuring approximately 4.9 x 2.9 cm and smaller lateral right parietooccipital cortical infarct measuring approximately 2.6 x 2.1 cm as seen on the prior CT.  Associated localized mass effect again noted including effacement of the adjacent portions ipsilateral right sylvian fissure and overlying cortical sulci.    Small possibly subacute or chronic posterior left frontal subcortical infarct measuring approximately 1.5 cm with small focus involvement of the overlying cortex noted.  Additional nonspecific T2 hyperintensities white matter may reflect moderate microvascular ischemic change.  Old lacunar infarcts and or incidental prominent perivascular spaces bilateral basal ganglia and thalami.  Presumed microvascular ischemic change central ange.    Visualized paranasal sinuses are clear as are mastoid air cells.    Impression  Areas of acute infarction right MCA distribution largest involving the  right perisylvian and lateral right frontal regions measuring 4.9 x 2.9 cm and smaller lateral right parietooccipital cortical infarct measuring 2.6 x 2.1 cm with associated localized mass effect including effacement of adjacent portions right sylvian fissure and overlying cortical sulci.    Nonacute posterior left frontal subcortical white matter infarct with small focus overlying cortical involvement.    Moderate presumed microvascular ischemic change white matter.      Electronically signed by: Saige Garcia MD  Date:    01/29/2025  Time:    09:03

## 2025-02-03 NOTE — PROGRESS NOTES
Pharmacist Renal Dose Adjustment Note    Eneida Majano is a 84 y.o. female being treated with the medication famotidine     Patient Data:    Vital Signs (Most Recent):    Vital Signs (72h Range):  Temp:  [97.1 °F (36.2 °C)-98.4 °F (36.9 °C)]   Pulse:  []   Resp:  [0-48]   BP: (116-189)/()   SpO2:  [69 %-100 %]      Recent Labs   Lab 01/31/25  0520 02/02/25  0525 02/03/25  0533   CREATININE 0.7 0.8 0.8     Serum creatinine: 0.8 mg/dL 02/03/25 0533  Estimated creatinine clearance: 40.3 mL/min    Medication:famotidine dose: 20 mg frequency Q 12 hrs will be changed to medication:famotidine dose:20 mg frequency:Q 24 hrs     Pharmacist's Name: Jessie Huff  Pharmacist's Extension: [phone]

## 2025-02-03 NOTE — ASSESSMENT & PLAN NOTE
Cardiology was consulted currently patient is on esmolol drip.  Beta blockers scheduled  Patient is a euvolemic  Patient received loading dose of digoxin for AFib with RVR uncontrolled  Echo Saline Bubble? Yes; Ultrasound enhancing contrast? Yes    Result Date: 1/30/2025    Left Ventricle: The left ventricle is normal in size. Mildly increased   wall thickness. Global hypokinesis present. There is severely reduced   systolic function. Quantitated ejection fraction is 28%. Unable to assess   diastolic function due to atrial fibrillation.    Right Ventricle: Right ventricular enlargement. Systolic function is   reduced.    Left Atrium: Left atrium is severely dilated. Agitated saline study of   the atrial septum is negative after vasalva maneuver, suggesting absence   of intracardiac shunt at the atrial level.    Right Atrium: Right atrium is dilated.    Mitral Valve: There is moderate regurgitation.  EROA 0.31 cm2    Tricuspid Valve: There is moderate regurgitation.    Pulmonary Artery: There is moderate pulmonary hypertension. The   estimated pulmonary artery systolic pressure is 60 mmHg.    IVC/SVC: Intermediate venous pressure at 8 mmHg.

## 2025-02-03 NOTE — ASSESSMENT & PLAN NOTE
Patient's blood pressure range in the last 24 hours was: BP  Min: 114/94  Max: 189/116.The patient's inpatient anti-hypertensive regimen is listed below:  Current Antihypertensives  esmolol 2000 mg in sodium chloride 0.9% 100 mL (20 mg/mL), Continuous, Intravenous  labetaloL injection 10 mg, Every 6 hours PRN, Intravenous  metoprolol tartrate (LOPRESSOR) tablet 50 mg, Every 6 hours, Oral    Plan  - BP is controlled, no changes needed to their regimen  - Allowing for permissive HTN given acute CVA, unable to tolerate p.o. medications as well

## 2025-02-03 NOTE — CONSULTS
[facility] - Intensive Care  Cardiology  Consult Note    Patient Name: Eneida Majano  MRN: [id number]  Admission Date: 2/3/2025  Hospital Length of Stay: 0 days  Code Status: Full Code   Attending Provider: Fozia Rush MD   Consulting Provider: Tai Lemons NP  Primary Care Physician: Paula Cooper NP  Principal Problem:<principal problem not specified>    Patient information was obtained from past medical records and ER records.     Inpatient consult to Cardiology  Consult performed by: Tai Lemons NP  Consult ordered by: Marco Fiore MD      Inpatient consult to Cardiology  Consult performed by: Tai Lemons NP  Consult ordered by: Marco Fiore MD      Inpatient consult to Cardiology-[facility]  Consult performed by: Tai Lemons NP  Consult ordered by: Fozia Rush MD        Subjective:     Chief Complaint:  CVA, tachycardia     HPI:   HPI retrieved from chart, patient largely nonverbal and no family at bedside. 84-year-old female with a history of anemia, atrial fibrillation/flutter (prescribed Eliquis as OP), rheumatoid arthritis, diabetes, gastroesophageal reflux disease, hypertension, hyperlipidemia, and insomnia transferred to [facility] on January 28 with aphasia/dysarthria, right lateral gaze, and left-sided weakness.  CT head showed evidence of a right MCA distribution stroke along with other areas of infarct.  CTA of the head and neck showed occlusion of the right M2 branch.  She had Vascular Neurology tele-consultation and was felt not to be a thrombolytic candidate.  She was also not a thrombectomy candidate with a large core infarct on imaging.  MRI brain also showed the areas of infarct.  She was admitted with right MCA stroke, atrial flutter/fibrillation with rapid ventricular response, and encephalopathy.  Anticoagulation was held due to concern for potential hemorrhagic conversion.  Of note, troponin was elevated and she had  echocardiogram that showed EF 28% with decreased right ventricular systolic function and moderate pulmonary hypertension.  Mentation improved somewhat by January 31.  She was placed on Plavix with plans to resume anticoagulation after 7 days.  By February 1, she had increased confusion.  She was more tachycardic.  On the morning of February 3, tachycardia persisted and she was upgraded in status and started on esmolol infusion.  She was seen by Cardiology.  On the morning of February 3 she was awake but confused and not following commands.  She was unable to swallow oral medicines.  Blood pressure remained stable.  [facility] does not have capability to continue management with esmolol infusion.  Case discussed with Cardiology.  Will plan transfer to Hospital Medicine at [facility] in ICU status for continued rate control with esmolol.  She will need Cardiology consultation for additional management.  She would also benefit neurology consultation.     February 3:  Sodium 138, potassium 4.1, chloride 108, CO2 19, BUN 13, creatinine 0.8, glucose 125, magnesium 1.9, white blood cells 7.04, hemoglobin 12.8, hematocrit 39.8, platelets 256  -EKG showed atrial fibrillation with ventricular rate 123.  T-wave abnormality.      Patient transferred to [facility] for higher level of care. Cardiology consulted for CHF and AF RVR. HR currently 120s with periods up to 140.       Past Medical History:   Diagnosis Date    Acid reflux     Anemia     Arthritis     Atrial fibrillation     Carpal tunnel syndrome     Cataract     Dry mouth     GERD (gastroesophageal reflux disease)     Hyperlipidemia     Hypertension     Insomnia     Other osteoporosis without current pathological fracture 3/14/2018    PONV (postoperative nausea and vomiting)     short term       Past Surgical History:   Procedure Laterality Date    APPENDECTOMY      CARDIOVERSION N/A 2/8/2019    Procedure: Cardioversion;  Surgeon: Emmanuel Matthew MD;  Location: [facility]  CATH LAB;  Service: Cardiology;  Laterality: N/A;    COLONOSCOPY      COLONOSCOPY N/A 2020    Procedure: COLONOSCOPY;  Surgeon: Gopi Brooke MD;  Location: [facility] ENDO;  Service: Endoscopy;  Laterality: N/A;    COLONOSCOPY N/A 2020    Procedure: COLONOSCOPY;  Surgeon: Gopi Brooke MD;  Location: [facility] ENDO;  Service: Endoscopy;  Laterality: N/A;    ESOPHAGOGASTRODUODENOSCOPY N/A 2020    Procedure: EGD (ESOPHAGOGASTRODUODENOSCOPY);  Surgeon: Gopi Brooke MD;  Location: [facility] ENDO;  Service: Endoscopy;  Laterality: N/A;    FOOT SURGERY Left     HAND SURGERY      HERNIA REPAIR      x2    HYSTERECTOMY  age 32    fibroids    OOPHORECTOMY         Review of patient's allergies indicates:   Allergen Reactions    Asa [aspirin] Nausea Only       Current Facility-Administered Medications on File Prior to Encounter   Medication    [COMPLETED] esmoloL injection 24,000 mcg    [COMPLETED] esmoloL injection 24,000 mcg    [COMPLETED] esmoloL injection 24,000 mcg    [] lactated ringers infusion    [COMPLETED] metoprolol injection 5 mg    [DISCONTINUED] acetaminophen suppository 650 mg    [DISCONTINUED] acetaminophen tablet 650 mg    [DISCONTINUED] atorvastatin tablet 40 mg    [DISCONTINUED] bisacodyL suppository 10 mg    [DISCONTINUED] calcium carbonate 200 mg calcium (500 mg) chewable tablet 500 mg    [DISCONTINUED] clopidogreL tablet 75 mg    [DISCONTINUED] dextrose 50% injection 12.5 g    [DISCONTINUED] electrolytes-dextrose (Pedialyte) oral solution 200 mL    [DISCONTINUED] enoxaparin injection 30 mg    [DISCONTINUED] esmolol 2000 mg in sodium chloride 0.9% 100 mL (20 mg/mL)    [DISCONTINUED] glucagon (human recombinant) injection 1 mg    [DISCONTINUED] haloperidol lactate injection 1 mg    [DISCONTINUED] hydroxychloroquine tablet 200 mg    [DISCONTINUED] hydrOXYzine pamoate capsule 25 mg    [DISCONTINUED] insulin aspart U-100 pen 0-5 Units    [DISCONTINUED] labetaloL injection 10 mg     [DISCONTINUED] metoprolol tartrate (LOPRESSOR) tablet 50 mg    [DISCONTINUED] metoprolol tartrate (LOPRESSOR) tablet 50 mg    [DISCONTINUED] mupirocin 2 % ointment    [DISCONTINUED] ondansetron injection 4 mg    [DISCONTINUED] prochlorperazine injection Soln 5 mg    [DISCONTINUED] senna-docusate 8.6-50 mg per tablet 1 tablet    [DISCONTINUED] sodium chloride 0.9% flush 10 mL     Current Outpatient Medications on File Prior to Encounter   Medication Sig    acetaminophen (TYLENOL) 650 MG TbSR Take 1 tablet (650 mg total) by mouth 3 (three) times daily as needed.    albuterol (PROVENTIL/VENTOLIN HFA) 90 mcg/actuation inhaler INHALE 2 PUFFS INTO THE LUNGS EVERY 6 HOURS AS NEEDED WHEEZING RESCUE    apixaban (ELIQUIS) 2.5 mg Tab Take 1 tablet (2.5 mg total) by mouth 2 (two) times daily.    aspirin 81 MG Chew Take 1 tablet (81 mg total) by mouth once daily.    baclofen (LIORESAL) 5 mg Tab tablet Take 2 tablets (10 mg total) by mouth after lunch for 3 days, THEN 1 tablet (5 mg total) after lunch for 2 days, THEN 0.5 tablets (2.5 mg total) after lunch for 2 days.    benzonatate (TESSALON) 200 MG capsule Take 1 capsule (200 mg total) by mouth 3 (three) times daily as needed for Cough.    blood pressure test kit-large Kit 1 kit by Misc.(Non-Drug; Combo Route) route 2 (two) times daily as needed.    blood sugar diagnostic (TRUE METRIX GLUCOSE TEST STRIP) Strp Check BS once daily    blood-glucose meter (TRUE METRIX GLUCOSE METER) kit Use to check BS once daily    diltiaZEM (CARDIZEM CD) 180 MG 24 hr capsule Take 1 capsule (180 mg total) by mouth once daily.    fluticasone propionate (FLONASE) 50 mcg/actuation nasal spray 1 spray to each nostril twice daily for 7 days, then may continue 1 spray to each nostril ONCE daily as needed for sinus congestion.    furosemide (LASIX) 20 MG tablet Take 1 tablet (20 mg total) by mouth daily as needed (edema).    hydroxychloroquine (PLAQUENIL) 200 mg tablet Take 1 tablet (200 mg total) by  mouth 2 (two) times daily.    lancets (LANCETS,THIN) Misc Check BS once daily    lisinopriL 10 MG tablet Take 1 tablet (10 mg total) by mouth every evening. for 120 doses    metoprolol succinate (TOPROL-XL) 50 MG 24 hr tablet Take 1 tablet (50 mg total) by mouth 2 (two) times daily.    nystatin (MYCOSTATIN) cream Apply topically 2 (two) times daily.    nystatin (MYCOSTATIN) powder Apply topically 4 (four) times daily.    polyethylene glycol (GLYCOLAX) 17 gram/dose powder Take 17 g by mouth once daily.    triamcinolone acetonide 0.1% (KENALOG) 0.1 % cream Apply topically 2 (two) times daily. for 7 days     Family History       Problem Relation (Age of Onset)    Arthritis Father    Asthma Daughter    Cancer Father, Sister    Diabetes Sister    Heart disease Sister, Brother    Leukemia Mother          Tobacco Use    Smoking status: Never     Passive exposure: Never    Smokeless tobacco: Never   Substance and Sexual Activity    Alcohol use: No     Alcohol/week: 0.0 standard drinks of alcohol    Drug use: No    Sexual activity: Not Currently     Partners: Male     Birth control/protection: Surgical     Review of Systems   Unable to perform ROS: Other     Objective:     Vital Signs (Most Recent):    Vital Signs (24h Range):  Temp:  [98 °F (36.7 °C)-98.4 °F (36.9 °C)] 98.3 °F (36.8 °C)  Pulse:  [110-250] 138  Resp:  [0-48] 11  SpO2:  [69 %-100 %] 99 %  BP: (114-189)/() 119/96     Weight: 51.8 kg (114 lb 3.2 oz)  Body mass index is 23.07 kg/m².            No intake or output data in the 24 hours ending 02/03/25 1351    Lines/Drains/Airways       Drain  Duration             Female External Urinary Catheter w/ Suction 02/03/25 0600 <1 day              Peripheral Intravenous Line  Duration                  Peripheral IV - Single Lumen 02/02/25 0634 22 G Posterior;Right Hand 1 day         Peripheral IV - Single Lumen 02/03/25 0530 20 G Anterior;Left Forearm <1 day                     Physical Exam  Constitutional:        "General: She is not in acute distress.     Appearance: She is not diaphoretic.   Eyes:      General:         Right eye: No discharge.         Left eye: No discharge.   Cardiovascular:      Rate and Rhythm: Tachycardia present. Rhythm irregular.      Heart sounds: Murmur heard.   Neurological:      Mental Status: She is alert.          Significant Labs: BMP:   Recent Labs   Lab 02/02/25  0525 02/03/25  0533   * 125*    138   K 3.3* 4.1    108   CO2 23 19*   BUN 12 13   CREATININE 0.8 0.8   CALCIUM 9.2 9.2   MG 1.9 1.9   , CMP   Recent Labs   Lab 02/02/25  0525 02/03/25  0533    138   K 3.3* 4.1    108   CO2 23 19*   * 125*   BUN 12 13   CREATININE 0.8 0.8   CALCIUM 9.2 9.2   ALBUMIN 2.8* 2.9*   ANIONGAP 10 11   , CBC   Recent Labs   Lab 02/02/25  0525 02/03/25  0534   WBC 5.01 7.04   HGB 12.7 12.8   HCT 38.5 39.8    256   , INR No results for input(s): "INR", "PROTIME" in the last 48 hours., Lipid Panel No results for input(s): "CHOL", "HDL", "LDLCALC", "TRIG", "CHOLHDL" in the last 48 hours., Troponin No results for input(s): "TROPONINIHS" in the last 48 hours., and All pertinent lab results from the last 24 hours have been reviewed.    Significant Imaging: Echocardiogram: Transthoracic echo (TTE) complete ([facility] Only):   Results for orders placed or performed during the hospital encounter of 01/29/25   Echo Saline Bubble? Yes; Ultrasound enhancing contrast? Yes   Result Value Ref Range    BSA 1.41 m2    Martin's Biplane MOD Ejection Fraction 28 %    A2C EF 26 %    A4C EF 32 %    LVOT stroke volume 35.1 cm3    LVIDd 3.4 (A) 3.5 - 6.0 cm    LV Systolic Volume 24.71 mL    LV Systolic Volume Index 17.5 mL/m2    LVIDs 2.6 2.1 - 4.0 cm    LV ESV A2C 79.54 mL    LV Diastolic Volume 47.31 mL    LV ESV A4C 56.20 mL    LV Diastolic Volume Index 33.55 mL/m2    LV EDV A2C 48.633651911737562 mL    LV EDV A4C 31.71 mL    Left Ventricular End Systolic Volume by Teichholz Method 24.71 " mL    Left Ventricular End Diastolic Volume by Teichholz Method 47.31 mL    IVS 1.1 0.6 - 1.1 cm    LVOT diameter 1.9 cm    LVOT area 2.8 cm2    FS 23.5 (A) 28 - 44 %    Left Ventricle Relative Wall Thickness 0.65 cm    PW 1.1 0.6 - 1.1 cm    LV mass 114.0 g    LV Mass Index 80.9 g/m2    MV Peak E Slick 1.15 m/s    TDI LATERAL 0.09 m/s    TDI SEPTAL 0.07 m/s    E/E' ratio 14 m/s    MV Peak A Slick 0.43 m/s    TR Max Slick 3.6 m/s    E/A ratio 2.67     E wave deceleration time 149 msec    LV SEPTAL E/E' RATIO 16.4 m/s    LV LATERAL E/E' RATIO 12.8 m/s    LVOT peak slick 0.6 m/s    Left Ventricular Outflow Tract Mean Velocity 0.40 cm/s    Left Ventricular Outflow Tract Mean Gradient 0.70 mmHg    RV- pimentel basal diam 2.2 cm    RV S' 8.81 cm/s    TAPSE 1.19 cm    RV/LV Ratio 0.65 cm    LA size 4.1 cm    Left Atrium Minor Axis 4.5 cm    Left Atrium Major Axis 5.6 cm    LA Vol (MOD) 68 mL    MYRIAM (MOD) 48 mL/m2    RA area length vol 48.49 mL    RA Area 18.7 cm2    RA Major Axis 5.17 cm    RA Width 3.67 cm    RA Vol 51.53 mL    AV mean gradient 2 mmHg    AV peak gradient 3 mmHg    Ao peak slick 0.9 m/s    Ao VTI 20.1 cm    LVOT peak VTI 12.4 cm    AV valve area 1.7 cm²    AV Velocity Ratio 0.67     AV index (prosthetic) 0.62     MARGI by Velocity Ratio 1.9 cm²    Mr max slick 6.48 m/s    MV stenosis pressure 1/2 time 43.20 ms    MV valve area p 1/2 method 5.09 cm2    TV mean gradient 34 mmHg    Triscuspid Valve Regurgitation Peak Gradient 52 mmHg    PV PEAK VELOCITY 0.79 m/s    PV peak gradient 2 mmHg    Pulmonary Valve Mean Velocity 0.61 m/s    Sinus 2.53 cm    STJ 1.94 cm    Ascending aorta 2.61 cm    IVC diameter 1.92 cm    Mean e' 0.08 m/s    ZLVIDS -0.31     ZLVIDD -2.47     LA area A4C 19.98 cm2    LA area A2C 24.67 cm2    RVDD 2.15 cm    TV resting pulmonary artery pressure 60 mmHg    RV TB RVSP 12 mmHg    Est. RA pres 8 mmHg    Narrative      Left Ventricle: The left ventricle is normal in size. Mildly increased   wall thickness.  Global hypokinesis present. There is severely reduced   systolic function. Quantitated ejection fraction is 28%. Unable to assess   diastolic function due to atrial fibrillation.    Right Ventricle: Right ventricular enlargement. Systolic function is   reduced.    Left Atrium: Left atrium is severely dilated. Agitated saline study of   the atrial septum is negative after vasalva maneuver, suggesting absence   of intracardiac shunt at the atrial level.    Right Atrium: Right atrium is dilated.    Mitral Valve: There is moderate regurgitation.  EROA 0.31 cm2    Tricuspid Valve: There is moderate regurgitation.    Pulmonary Artery: There is moderate pulmonary hypertension. The   estimated pulmonary artery systolic pressure is 60 mmHg.    IVC/SVC: Intermediate venous pressure at 8 mmHg.       Assessment and Plan:     Acute on chronic heart failure  TTE    Left Ventricle: The left ventricle is normal in size. Mildly increased   wall thickness. Global hypokinesis present. There is severely reduced   systolic function. Quantitated ejection fraction is 28%. Unable to assess   diastolic function due to atrial fibrillation.    Right Ventricle: Right ventricular enlargement. Systolic function is   reduced.    Left Atrium: Left atrium is severely dilated. Agitated saline study of   the atrial septum is negative after vasalva maneuver, suggesting absence   of intracardiac shunt at the atrial level.    Right Atrium: Right atrium is dilated.    Mitral Valve: There is moderate regurgitation.  EROA 0.31 cm2    Tricuspid Valve: There is moderate regurgitation.    Pulmonary Artery: There is moderate pulmonary hypertension. The   estimated pulmonary artery systolic pressure is 60 mmHg.    IVC/SVC: Intermediate venous pressure at 8 mmHg.    Previous TTEs with normal LVEF  Will proceed with ischemic evaluation, likely as OP given acute CVA  No CP reported PTA or since admission     Not overloaded on exam      Stroke due to occlusion of  right middle cerebral artery    Neurology following, on DAPT and statin        Atrial fibrillation with RVR  Patient has paroxysmal (<7 days) atrial fibrillation. Patient is currently in atrial fibrillation. [id number] Score: 5. The patients heart rate in the last 24 hours is as follows:  Pulse  Min: 110  Max: 250     Antiarrhythmics  esmolol 2000 mg in sodium chloride 0.9% 100 mL (20 mg/mL), Continuous, Intravenous  metoprolol tartrate (LOPRESSOR) tablet 50 mg, Every 6 hours, Oral    Anticoagulants   On hold 2/2 acute CVA due to risk of hemorrhagic conversion      Plan  - Replete lytes with a goal of K>4, Mg >2  - Patient is not anticoagulated due to acute CVA  - Patient's afib is currently uncontrolled. Will adjust treatment as follows: Checking Digoxin level and will load with Digoxin      Essential hypertension  Patient's blood pressure range in the last 24 hours was: BP  Min: 114/94  Max: 189/116.The patient's inpatient anti-hypertensive regimen is listed below:  Current Antihypertensives  esmolol 2000 mg in sodium chloride 0.9% 100 mL (20 mg/mL), Continuous, Intravenous  labetaloL injection 10 mg, Every 6 hours PRN, Intravenous  metoprolol tartrate (LOPRESSOR) tablet 50 mg, Every 6 hours, Oral    Plan  - BP is controlled, no changes needed to their regimen  - Allowing for permissive HTN given acute CVA, unable to tolerate p.o. medications as well          VTE Risk Mitigation (From admission, onward)           Ordered     IP VTE HIGH RISK PATIENT  Once         02/03/25 1250     Place sequential compression device  Until discontinued         02/03/25 1250                    Thank you for your consult. I will follow-up with patient. Please contact us if you have any additional questions.    Tai Lemons, ALIRIO  Cardiology   [facility] - Intensive Care

## 2025-02-04 PROBLEM — Z71.89 ACP (ADVANCE CARE PLANNING): Status: ACTIVE | Noted: 2025-02-04

## 2025-02-04 LAB
ALBUMIN SERPL BCP-MCNC: 2.9 G/DL (ref 3.5–5.2)
ANION GAP SERPL CALC-SCNC: 14 MMOL/L (ref 8–16)
ANION GAP SERPL CALC-SCNC: 14 MMOL/L (ref 8–16)
BASOPHILS # BLD AUTO: 0.03 K/UL (ref 0–0.2)
BASOPHILS NFR BLD: 0.3 % (ref 0–1.9)
BUN SERPL-MCNC: 22 MG/DL (ref 8–23)
BUN SERPL-MCNC: 22 MG/DL (ref 8–23)
CALCIUM SERPL-MCNC: 8.8 MG/DL (ref 8.7–10.5)
CALCIUM SERPL-MCNC: 8.9 MG/DL (ref 8.7–10.5)
CHLORIDE SERPL-SCNC: 108 MMOL/L (ref 95–110)
CHLORIDE SERPL-SCNC: 108 MMOL/L (ref 95–110)
CO2 SERPL-SCNC: 18 MMOL/L (ref 23–29)
CO2 SERPL-SCNC: 18 MMOL/L (ref 23–29)
CREAT SERPL-MCNC: 1.4 MG/DL (ref 0.5–1.4)
CREAT SERPL-MCNC: 1.4 MG/DL (ref 0.5–1.4)
DIFFERENTIAL METHOD BLD: ABNORMAL
EOSINOPHIL # BLD AUTO: 0.1 K/UL (ref 0–0.5)
EOSINOPHIL NFR BLD: 0.9 % (ref 0–8)
ERYTHROCYTE [DISTWIDTH] IN BLOOD BY AUTOMATED COUNT: 15.5 % (ref 11.5–14.5)
EST. GFR  (NO RACE VARIABLE): 37 ML/MIN/1.73 M^2
EST. GFR  (NO RACE VARIABLE): 37 ML/MIN/1.73 M^2
GLUCOSE SERPL-MCNC: 106 MG/DL (ref 70–110)
GLUCOSE SERPL-MCNC: 107 MG/DL (ref 70–110)
HCT VFR BLD AUTO: 36.9 % (ref 37–48.5)
HGB BLD-MCNC: 12.1 G/DL (ref 12–16)
IMM GRANULOCYTES # BLD AUTO: 0.08 K/UL (ref 0–0.04)
IMM GRANULOCYTES NFR BLD AUTO: 0.9 % (ref 0–0.5)
LYMPHOCYTES # BLD AUTO: 1 K/UL (ref 1–4.8)
LYMPHOCYTES NFR BLD: 10.2 % (ref 18–48)
MAGNESIUM SERPL-MCNC: 1.9 MG/DL (ref 1.6–2.6)
MAGNESIUM SERPL-MCNC: 1.9 MG/DL (ref 1.6–2.6)
MCH RBC QN AUTO: 29.1 PG (ref 27–31)
MCHC RBC AUTO-ENTMCNC: 32.8 G/DL (ref 32–36)
MCV RBC AUTO: 89 FL (ref 82–98)
MONOCYTES # BLD AUTO: 0.7 K/UL (ref 0.3–1)
MONOCYTES NFR BLD: 7.6 % (ref 4–15)
NEUTROPHILS # BLD AUTO: 7.4 K/UL (ref 1.8–7.7)
NEUTROPHILS NFR BLD: 80.1 % (ref 38–73)
NRBC BLD-RTO: 0 /100 WBC
PHOSPHATE SERPL-MCNC: 3.8 MG/DL (ref 2.7–4.5)
PHOSPHATE SERPL-MCNC: 3.9 MG/DL (ref 2.7–4.5)
PLATELET # BLD AUTO: 228 K/UL (ref 150–450)
PLATELET BLD QL SMEAR: ABNORMAL
PMV BLD AUTO: 10.6 FL (ref 9.2–12.9)
POCT GLUCOSE: 102 MG/DL (ref 70–110)
POCT GLUCOSE: 106 MG/DL (ref 70–110)
POCT GLUCOSE: 78 MG/DL (ref 70–110)
POCT GLUCOSE: 86 MG/DL (ref 70–110)
POCT GLUCOSE: 96 MG/DL (ref 70–110)
POTASSIUM SERPL-SCNC: 4.9 MMOL/L (ref 3.5–5.1)
POTASSIUM SERPL-SCNC: 5 MMOL/L (ref 3.5–5.1)
RBC # BLD AUTO: 4.16 M/UL (ref 4–5.4)
SODIUM SERPL-SCNC: 140 MMOL/L (ref 136–145)
SODIUM SERPL-SCNC: 140 MMOL/L (ref 136–145)
WBC # BLD AUTO: 9.27 K/UL (ref 3.9–12.7)

## 2025-02-04 PROCEDURE — 97530 THERAPEUTIC ACTIVITIES: CPT

## 2025-02-04 PROCEDURE — 85025 COMPLETE CBC W/AUTO DIFF WBC: CPT | Performed by: INTERNAL MEDICINE

## 2025-02-04 PROCEDURE — 25000003 PHARM REV CODE 250: Performed by: FAMILY MEDICINE

## 2025-02-04 PROCEDURE — 25000003 PHARM REV CODE 250: Performed by: INTERNAL MEDICINE

## 2025-02-04 PROCEDURE — 92610 EVALUATE SWALLOWING FUNCTION: CPT

## 2025-02-04 PROCEDURE — 83735 ASSAY OF MAGNESIUM: CPT

## 2025-02-04 PROCEDURE — 25000003 PHARM REV CODE 250: Performed by: STUDENT IN AN ORGANIZED HEALTH CARE EDUCATION/TRAINING PROGRAM

## 2025-02-04 PROCEDURE — 84100 ASSAY OF PHOSPHORUS: CPT

## 2025-02-04 PROCEDURE — 36410 VNPNXR 3YR/> PHY/QHP DX/THER: CPT

## 2025-02-04 PROCEDURE — C1751 CATH, INF, PER/CENT/MIDLINE: HCPCS

## 2025-02-04 PROCEDURE — 63600175 PHARM REV CODE 636 W HCPCS

## 2025-02-04 PROCEDURE — 99900035 HC TECH TIME PER 15 MIN (STAT)

## 2025-02-04 PROCEDURE — 97167 OT EVAL HIGH COMPLEX 60 MIN: CPT

## 2025-02-04 PROCEDURE — 80048 BASIC METABOLIC PNL TOTAL CA: CPT

## 2025-02-04 PROCEDURE — 63600175 PHARM REV CODE 636 W HCPCS: Performed by: NURSE PRACTITIONER

## 2025-02-04 PROCEDURE — 20000000 HC ICU ROOM

## 2025-02-04 PROCEDURE — 94761 N-INVAS EAR/PLS OXIMETRY MLT: CPT

## 2025-02-04 PROCEDURE — 25000003 PHARM REV CODE 250

## 2025-02-04 PROCEDURE — 83735 ASSAY OF MAGNESIUM: CPT | Mod: 91 | Performed by: INTERNAL MEDICINE

## 2025-02-04 PROCEDURE — 80069 RENAL FUNCTION PANEL: CPT | Performed by: INTERNAL MEDICINE

## 2025-02-04 RX ORDER — LISINOPRIL 10 MG/1
10 TABLET ORAL DAILY
Status: DISCONTINUED | OUTPATIENT
Start: 2025-02-04 | End: 2025-02-08

## 2025-02-04 RX ORDER — DIGOXIN 125 MCG
0.12 TABLET ORAL DAILY
Status: DISCONTINUED | OUTPATIENT
Start: 2025-02-05 | End: 2025-02-10

## 2025-02-04 RX ORDER — DIGOXIN 0.25 MG/ML
125 INJECTION INTRAMUSCULAR; INTRAVENOUS DAILY
Status: DISCONTINUED | OUTPATIENT
Start: 2025-02-04 | End: 2025-02-04

## 2025-02-04 RX ORDER — SODIUM CHLORIDE 0.9 % (FLUSH) 0.9 %
10 SYRINGE (ML) INJECTION EVERY 12 HOURS PRN
Status: DISCONTINUED | OUTPATIENT
Start: 2025-02-04 | End: 2025-02-12 | Stop reason: HOSPADM

## 2025-02-04 RX ADMIN — FAMOTIDINE 20 MG: 10 INJECTION, SOLUTION INTRAVENOUS at 08:02

## 2025-02-04 RX ADMIN — ESMOLOL HYDROCHLORIDE IN SODIUM CHLORIDE 100 MCG/KG/MIN: 20 INJECTION INTRAVENOUS at 05:02

## 2025-02-04 RX ADMIN — APIXABAN 2.5 MG: 2.5 TABLET, FILM COATED ORAL at 09:02

## 2025-02-04 RX ADMIN — ESMOLOL HYDROCHLORIDE IN SODIUM CHLORIDE 250 MCG/KG/MIN: 20 INJECTION INTRAVENOUS at 02:02

## 2025-02-04 RX ADMIN — DIGOXIN 125 MCG: 0.25 INJECTION INTRAMUSCULAR; INTRAVENOUS at 08:02

## 2025-02-04 RX ADMIN — Medication 200 ML: at 10:02

## 2025-02-04 RX ADMIN — ATORVASTATIN CALCIUM 80 MG: 40 TABLET, FILM COATED ORAL at 08:02

## 2025-02-04 RX ADMIN — ESMOLOL HYDROCHLORIDE IN SODIUM CHLORIDE 175 MCG/KG/MIN: 20 INJECTION INTRAVENOUS at 12:02

## 2025-02-04 RX ADMIN — ESMOLOL HYDROCHLORIDE IN SODIUM CHLORIDE 175 MCG/KG/MIN: 20 INJECTION INTRAVENOUS at 04:02

## 2025-02-04 RX ADMIN — HYDROXYCHLOROQUINE SULFATE 200 MG: 200 TABLET, FILM COATED ORAL at 09:02

## 2025-02-04 RX ADMIN — ESMOLOL HYDROCHLORIDE IN SODIUM CHLORIDE 125 MCG/KG/MIN: 20 INJECTION INTRAVENOUS at 11:02

## 2025-02-04 RX ADMIN — SENNOSIDES AND DOCUSATE SODIUM 1 TABLET: 8.6; 5 TABLET ORAL at 09:02

## 2025-02-04 RX ADMIN — Medication 200 ML: at 02:02

## 2025-02-04 RX ADMIN — METOPROLOL TARTRATE 50 MG: 50 TABLET, FILM COATED ORAL at 12:02

## 2025-02-04 RX ADMIN — METOPROLOL TARTRATE 50 MG: 50 TABLET, FILM COATED ORAL at 05:02

## 2025-02-04 RX ADMIN — ESMOLOL HYDROCHLORIDE IN SODIUM CHLORIDE 175 MCG/KG/MIN: 20 INJECTION INTRAVENOUS at 08:02

## 2025-02-04 RX ADMIN — ESMOLOL HYDROCHLORIDE IN SODIUM CHLORIDE 300 MCG/KG/MIN: 20 INJECTION INTRAVENOUS at 12:02

## 2025-02-04 RX ADMIN — Medication 200 ML: at 05:02

## 2025-02-04 NOTE — EICU
EICU FOLLOWUP NOTE:    Called for:  Maintenance IV fluids    DISCUSSED with bedside nurse    ASSESSMENT AND PLAN:    Patient is admitted with new stroke and atrial flutter.  Currently patient is very lethargic and unable to participate in swallow study.  Bedside nurse is concerned about no fluids ongoing.  I will start Ringer's lactate at a rate of 50 cc/hour.  I will defer to the primary team with the bedside place NG tube if indicated for p.o. medications.    Thank You for allowing EICU to participate in the care of the patient. Please call as needed    Prashant Duron MD  [facility]  [phone]

## 2025-02-04 NOTE — ASSESSMENT & PLAN NOTE
Advance Care Planning     Date: 02/04/2025    Code Status  Code status unknown. we agreed to leave patient as full code until code status is confirmed  by family./     A total of 20 min was spent on advance care planning, goals of care discussion, emotional support, formulating and communicating prognosis and exploring burden/benefit of various approaches of treatment. This discussion occurred on a fully voluntary basis with the verbal consent of the patient and/or family.

## 2025-02-04 NOTE — SUBJECTIVE & OBJECTIVE
Interval History: does not follow command  Appreciates SLP rec's ,     Awaiting repeat MRI brain,   Appreciates neurology - EEG, repeat MRI brain  Updated patient daughter  over the phone, who reported patient at baseline is sufficient except driving,. Code status unknown but will find out from grad daughter that lives with patient, there is no MPOA. Questions and concerns addressed, we agreed to leave patient as full code until code status is confirmed  by family.      Review of Systems   Unable to perform ROS: Mental status change     Objective:     Vital Signs (Most Recent):  Temp: 97.5 °F (36.4 °C) (02/04/25 0730)  Pulse: 78 (02/04/25 0830)  Resp: 17 (02/04/25 0830)  BP: 119/87 (02/04/25 0830)  SpO2: 97 % (02/04/25 0830) Vital Signs (24h Range):  Temp:  [96.7 °F (35.9 °C)-98.8 °F (37.1 °C)] 97.5 °F (36.4 °C)  Pulse:  [] 78  Resp:  [0-46] 17  SpO2:  [82 %-100 %] 97 %  BP: ()/() 119/87     Weight: 51.8 kg (114 lb 3.2 oz)  Body mass index is 23.07 kg/m².    Intake/Output Summary (Last 24 hours) at 2/4/2025 0850  Last data filed at 2/4/2025 0636  Gross per 24 hour   Intake 1523.37 ml   Output --   Net 1523.37 ml         Physical Exam  Constitutional:       General: She is not in acute distress.     Appearance: She is ill-appearing. She is not toxic-appearing.      Comments: Very cachectic   HENT:      Head: Normocephalic and atraumatic.   Cardiovascular:      Rate and Rhythm: Normal rate. Rhythm irregular.   Pulmonary:      Effort: Pulmonary effort is normal.      Breath sounds: Normal breath sounds.   Abdominal:      General: Abdomen is flat.      Palpations: Abdomen is soft.   Musculoskeletal:         General: No swelling or tenderness.      Cervical back: Normal range of motion.      Right lower leg: No edema.      Left lower leg: No edema.   Skin:     General: Skin is dry.      Capillary Refill: Capillary refill takes less than 2 seconds.      Coloration: Skin is pale.  "  Neurological:      Mental Status: She is disoriented.             Significant Labs: A1C:   Recent Labs   Lab 10/01/24  0816 01/29/25  2300   HGBA1C 5.8* 5.7*     ABGs:   Recent Labs   Lab 02/03/25  0536 02/03/25  1741 02/03/25  1832   PH 7.477* 7.414 7.582*   PCO2 31.0* 36.8 18.2*   HCO3 22.9* 23.5* 17.1*   POCSATURATED 65 54.0* 98.8   BE -1  --   --    PO2 31* 30.3* 161*     Blood Culture:   Recent Labs   Lab 02/03/25  1325   LABBLOO No Growth to date     CBC:   Recent Labs   Lab 02/03/25  0534 02/03/25  1353 02/04/25  0337   WBC 7.04 8.50  8.50 9.27   HGB 12.8 12.0  12.0 12.1   HCT 39.8 37.4  37.4 36.9*    253  253 228     CMP:   Recent Labs   Lab 02/03/25  0533 02/03/25  1353 02/04/25  0337    140 140  140   K 4.1 4.6 5.0  4.9    107 108  108   CO2 19* 20* 18*  18*   * 108 106  107   BUN 13 16 22  22   CREATININE 0.8 1.1 1.4  1.4   CALCIUM 9.2 9.3 8.8  8.9   PROT  --  6.8  --    ALBUMIN 2.9* 3.2* 2.9*   BILITOT  --  1.3*  --    ALKPHOS  --  57  --    AST  --  26  --    ALT  --  13  --    ANIONGAP 11 13 14  14     Cardiac Markers:   Recent Labs   Lab 02/03/25  1353   *     Lactic Acid:   Recent Labs   Lab 02/03/25  1353   LACTATE 1.7     Lipase:   Recent Labs   Lab 02/03/25  1353   LIPASE 14     Lipid Panel: No results for input(s): "CHOL", "HDL", "LDLCALC", "TRIG", "CHOLHDL" in the last 48 hours.  Magnesium:   Recent Labs   Lab 02/03/25  0533 02/04/25  0337   MG 1.9 1.9  1.9     Troponin:   Recent Labs   Lab 02/03/25  1353   TROPONINI 0.218*     TSH:   Recent Labs   Lab 02/03/25  1353   TSH 3.156     Urine Culture: No results for input(s): "LABURIN" in the last 48 hours.  Urine Studies: No results for input(s): "COLORU", "APPEARANCEUA", "PHUR", "SPECGRAV", "PROTEINUA", "GLUCUA", "KETONESU", "BILIRUBINUA", "OCCULTUA", "NITRITE", "UROBILINOGEN", "LEUKOCYTESUR", "RBCUA", "WBCUA", "BACTERIA", "SQUAMEPITHEL", "HYALINECASTS" in the last 48 hours.    Invalid input(s): " ""WRIGHTSUR"    Significant Imaging: I have reviewed all pertinent imaging results/findings within the past 24 hours.  "

## 2025-02-04 NOTE — ASSESSMENT & PLAN NOTE
Patient has paroxysmal (<7 days) atrial fibrillation. Patient is currently in Atrial Flutter. [id number] Score: 5. The patients heart rate in the last 24 hours is as follows:  Pulse  Min: 78  Max: 155     Continue rate control strategy    Antiarrhythmics  esmolol 2000 mg in sodium chloride 0.9% 100 mL (20 mg/mL), Continuous, Intravenous  metoprolol injection 5 mg, Every 6 hours PRN, Intravenous  Digoxin 0.125 mg IV daily until tolerating p.o.    Anticoagulants  apixaban tablet 2.5 mg, 2 times daily, OralOn hold 2/2 acute CVA due to risk of hemorrhagic conversion. Start heparin gtt/Lovenox when cleared by Neurology     Plan  - Delon rutledge with a goal of K>4, Mg >2  - Patient is not anticoagulated due to acute CVA  - Patient's afib is currently uncontrolled. Will adjust treatment as follows: Checking Digoxin level and will load with Digoxin

## 2025-02-04 NOTE — PLAN OF CARE
Problem: Occupational Therapy  Goal: Occupational Therapy Goal  Description: Goals to be met by: 03/04/2025      Patient will increase functional independence with ADLs by performing:    UE Dressing with Moderate Assistance.  LE Dressing with Maximum Assistance.  Grooming while seated with Moderate Assistance.  Toileting from bedside commode with Maximum Assistance for hygiene and clothing management.   Toilet transfer to bedside commode with Moderate Assistance.  Increased functional strength to WFL for self care.  Upper extremity exercise program x10 reps per handout, with assistance as needed.      Outcome: Progressing   Pt would benefit from continued OT to address deficits in self care and functional mobility. Recommending high intensity therapy; DME needs defer to next level of care

## 2025-02-04 NOTE — PT/OT/SLP EVAL
Occupational Therapy   Evaluation    Name: Eneida Majano  MRN: [id number]  Admitting Diagnosis: Cerebrovascular accident (CVA) due to embolism of right middle cerebral artery  Recent Surgery: * No surgery found *      Recommendations:     Discharge Recommendations: High Intensity Therapy  Discharge Equipment Recommendations:  to be determined by next level of care  Barriers to discharge:  Inaccessible home environment, Decreased caregiver support (Pt requires significantly increased assistance at this time)    Assessment:     Eneida Majano is a 84 y.o. female with a medical diagnosis of Cerebrovascular accident (CVA) due to embolism of right middle cerebral artery.  She presents with significant decline from prior OT evaluation dated 1/30/2025. Pt unable to follow commands this session, total A for all mobility with significant L lateral lean in supported sitting, preference for L cervical rotation and with assistance to come to neutral position while seated or neutral cervical rotation, pt appears to have increased distress with possible fear of falling while seated in upright position at EOB. Pt with spontaneous movement BUE but minimal AROM. Pt able to reach with R hand ~0-25* shoulder flexion and ~0-110* elbow flexion and demonstrated full R hand digital extension,  grasps with adequate digital flexion  Performance deficits affecting function: weakness, impaired endurance, impaired self care skills, impaired functional mobility, gait instability, impaired balance, decreased ROM, abnormal tone, decreased safety awareness, decreased lower extremity function, decreased coordination, impaired cognition, impaired coordination, impaired fine motor, impaired cardiopulmonary response to activity.      Rehab Prognosis:  Good ; patient would benefit from acute skilled OT services to address these deficits and reach maximum level of function.       Plan:     Patient to be seen 5 x/week to address the above listed  problems via self-care/home management, therapeutic activities, therapeutic exercises  Plan of Care Expires:    Plan of Care Reviewed with: patient, family    Subjective     Chief Complaint: Unable to make needs/wants known, occasional groans  Patient/Family Comments/goals: None stated    Occupational Profile:  Living Environment: Per chart review, pt lives with her children in [facility], [address]  Previous level of function: Per chart review, Indep ADLs and functional mobility   Roles and Routines: Appears to be caretaker to self and home, no longer working or driving  Equipment Used at Home: wheelchair, walker, rolling, cane, straight  Assistance upon Discharge: Unknown    Pain/Comfort:  Pain Rating 1:  (unable to rate, some moaning wtih light pinch to RLE but no movement noted, withdraw from light pich LLE)    Patients cultural, spiritual, Nondenominational conflicts given the current situation:      Objective:     Communicated with: sandeep prior to session.  Patient found HOB elevated with bed alarm, peripheral IV, pulse ox (continuous), blood pressure cuff, telemetry, PureWick, NG tube, restraints (B hand mitts placed but not tied to bed) upon OT entry to room.    General Precautions: Standard, fall, aphasia, aspiration  Orthopedic Precautions: N/A  Braces: N/A  Respiratory Status: Room air    Occupational Performance:    Bed Mobility:    Patient completed Rolling/Turning to Right with total assistance  Patient completed Scooting/Bridging with total assistance  Patient completed Supine to Sit with total assistance  Patient completed Sit to Supine with total assistance    Functional Mobility/Transfers:  Unsafe for OOB attempts at this time, poor static seated balance and postural awareness with strong L lateral/posterior lean and heightened guarding with placement into upright seated posture   Functional Mobility: Mod A static seated balance, able to maintain head soemwhat upright but preference noted for L cervical  rotation    Activities of Daily Living:  Upper Body Dressing: dependence to don/doff gown as robe seated EOB  Lower Body Dressing: dependence to don B sock supine in bed    Cognitive/Visual Perceptual:  Cognitive/Psychosocial Skills:     -       Oriented to: None: Does not rotate head to voice or to own name. Did not vocalize or appear to react to environmental stimuli at all, except some groaning with noxious stim  -       Follows Commands/attention: Follows no commands  -       Communication: receptive and expressive aphasia per chart review, no use of speech in session. Groan x1  -       Memory: Unable to assess  -       Safety awareness/insight to disability: impaired  -       Mood/Affect/Coping skills/emotional control: flat, unaffected by most any environmental stimulus    Physical Exam:  Postural examination/scapula alignment:    -       Rounded shoulders, forward head  Skin integrity: Visible skin intact  Sensation:    -       Intact to noxious stim BUE/BLE, groaning with pinch to RLE, wince to pinch RUE, withdraw from pinch LUE/LLE  Motor Planning:    -       Grossly impaired  Dominant hand:    -       R handed per chart review  Upper Extremity Range of Motion: minimal movement BUE but did reach with B hands to grasp, R>L  Upper Extremity Strength:  BUE grossly impaired, appears at least 2-/5   Strength:  R hand 3 to 3+/5, L hand 2/5  Fine Motor Coordination:    -       impaired  Gross motor coordination:   impaired    AMPAC 6 Click ADL:  AMPAC Total Score: 6    Treatment & Education:  Pt educated on role of OT and POC.   Pt performing skills as listed above.     Patient left HOB elevated with all lines intact, call button in reach, nsg notified, and nsg and family present    GOALS:   Multidisciplinary Problems       Occupational Therapy Goals          Problem: Occupational Therapy    Goal Priority Disciplines Outcome Interventions   Occupational Therapy Goal     OT, PT/OT Progressing    Description:  Goals to be met by: 03/04/2025      Patient will increase functional independence with ADLs by performing:    UE Dressing with Moderate Assistance.  LE Dressing with Maximum Assistance.  Grooming while seated with Moderate Assistance.  Toileting from bedside commode with Maximum Assistance for hygiene and clothing management.   Toilet transfer to bedside commode with Moderate Assistance.  Increased functional strength to WFL for self care.  Upper extremity exercise program x10 reps per handout, with assistance as needed.                           History:     Past Medical History:   Diagnosis Date    Acid reflux     Anemia     Arthritis     Atrial fibrillation     Carpal tunnel syndrome     Cataract     Dry mouth     GERD (gastroesophageal reflux disease)     Hyperlipidemia     Hypertension     Insomnia     Other osteoporosis without current pathological fracture 3/14/2018    PONV (postoperative nausea and vomiting)     short term         Past Surgical History:   Procedure Laterality Date    APPENDECTOMY      CARDIOVERSION N/A 2/8/2019    Procedure: Cardioversion;  Surgeon: Emmanuel Matthew MD;  Location: [facility] CATH LAB;  Service: Cardiology;  Laterality: N/A;    COLONOSCOPY      COLONOSCOPY N/A 5/28/2020    Procedure: COLONOSCOPY;  Surgeon: Gopi Brooke MD;  Location: [facility];  Service: Endoscopy;  Laterality: N/A;    COLONOSCOPY N/A 7/7/2020    Procedure: COLONOSCOPY;  Surgeon: Gopi Brooke MD;  Location: [facility];  Service: Endoscopy;  Laterality: N/A;    ESOPHAGOGASTRODUODENOSCOPY N/A 5/28/2020    Procedure: EGD (ESOPHAGOGASTRODUODENOSCOPY);  Surgeon: Gopi Brooke MD;  Location: [facility];  Service: Endoscopy;  Laterality: N/A;    FOOT SURGERY Left     HAND SURGERY      HERNIA REPAIR      x2    HYSTERECTOMY  age 32    fibroids    OOPHORECTOMY         Time Tracking:     OT Date of Treatment: 02/04/25  OT Start Time: 1400  OT Stop Time: 1438  OT Total Time (min): 38 min    Billable  Minutes:Evaluation 15  Therapeutic Activity 23    2/4/2025

## 2025-02-04 NOTE — PLAN OF CARE
SOCIAL WORK DISCHARGE PLANNING ASSESSMENT    SW completed discharge planning assessment. Pt is a transfer from [facility]. Pt lives with her grandchildren. Pt has good support from family and friends and advised pt's daughter Tania ([phone]) will provide assistance as needed after returning home. Pt has the following DME: rw, cane, and wc. Pt is not current with  services. Pt's family drives pt to doctor appointments. Pt's discharge plan is to transfer to [facility]. Pt has been accepted to [facility]. SW spoke with Florence ([phone]) with [facility]. Per Florence, they are following pt. Upon discharge, pt will most likely be transferring to [facility]. Pt will need transportation to [facility].     Future Appointments   Date Time Provider Department Center   2/11/2025 11:30 AM [facility] US1 [facility]   2/18/2025 11:00 AM Paula Cooper NP [facility]   2/20/2025 11:15 AM OPHTHALMOLOGY TESTING, [facility] OPHTHAL [facility]   2/20/2025 12:30 PM JOANNE Kimbrough IV, MD [facility] OPHTHAL [facility]   4/3/2025  7:50 AM [facility]   4/10/2025  9:00 AM Paula Cooper NP [facility]   6/16/2025  9:00 AM Pepe Izaguirre MD [facility] DERM [facility]        Patient Active Problem List   Diagnosis    Impingement syndrome of right shoulder    Nodule of soft tissue -Left foot    Breast cancer screening    Chronic right shoulder pain    T2DM (type 2 diabetes mellitus)    Palpitations    Osteoarthritis    Arthritis, midfoot    Gastroesophageal reflux disease    Cataracts, bilateral    Nuclear sclerosis of both eyes    Essential hypertension    Type 2 diabetes mellitus without ophthalmic manifestations    Atrial fibrillation    CTS (carpal tunnel syndrome)    Bilateral lower extremity edema    Osteopenia of multiple sites    Rheumatoid arthritis    Other osteoporosis without current pathological  fracture    Intolerance of oral bisphosphonate therapy    Typical atrial flutter    Tachycardia    CHF exacerbation    Atrial fibrillation with RVR    Atrial flutter, paroxysmal    At risk for amiodarone toxicity with long term use    (HFpEF) heart failure with preserved ejection fraction    Severe anemia    Seasonal allergies    Chronic cough    Symptomatic anemia    Anemia    Dysphagia    Choking due to food in larynx    Atrial flutter with rapid ventricular response    Pulmonary nodule    Stroke due to occlusion of right middle cerebral artery    Cerebrovascular accident (CVA) due to embolism of right middle cerebral artery    Serum total bilirubin elevated    Elevated troponin level not due to acute coronary syndrome    Acute encephalopathy    Embolic stroke involving left middle cerebral artery    Acute on chronic heart failure      02/04/25 1008   Discharge Assessment   Assessment Type Discharge Planning Assessment   Confirmed/corrected address, phone number and insurance Yes   Confirmed Demographics Correct on Facesheet   Source of Information patient;health record;family   Communicated DELORES with patient/caregiver Yes   Reason For Admission atrial fibrillation with RVR   People in Home grandchild(reyna)   Facility Arrived From: [facility]   Do you expect to return to your current living situation? No  (Pt will be transfering to a SNF facility upon discharge.)   Do you have help at home or someone to help you manage your care at home? Yes   Who are your caregiver(s) and their phone number(s)? pt's daughter Tania [phone]   Walking or Climbing Stairs Difficulty yes   Walking or Climbing Stairs ambulation difficulty, requires equipment   Dressing/Bathing Difficulty no   Home Accessibility wheelchair accessible   Home Layout Able to live on 1st floor   Equipment Currently Used at Home walker, rolling;cane, straight;wheelchair   Readmission within 30 days? No   Patient currently being followed by  outpatient case management? No   Do you currently have service(s) that help you manage your care at home? No   Do you take prescription medications? Yes   Do you have prescription coverage? Yes   Coverage PHN   Do you have any problems affording any of your prescribed medications? No   Is the patient taking medications as prescribed? yes   Who is going to help you get home at discharge? pt's daughter Tania [phone]   How do you get to doctors appointments? family or friend will provide   Are you on dialysis? No   Do you take coumadin? No   Discharge Plan A Skilled Nursing Facility   DME Needed Upon Discharge    (TBD)   Transition of Care Barriers None   Physical Activity   On average, how many days per week do you engage in moderate to strenuous exercise (like a brisk walk)? Pt Unable   On average, how many minutes do you engage in exercise at this level? Pt Unable   Financial Resource Strain   How hard is it for you to pay for the very basics like food, housing, medical care, and heating? Pt Unable   Housing Stability   In the last 12 months, was there a time when you were not able to pay the mortgage or rent on time? Pt Unable   At any time in the past 12 months, were you homeless or living in a shelter (including now)? Pt Unable   Transportation Needs   Has the lack of transportation kept you from medical appointments, meetings, work or from getting things needed for daily living? Patient unable to answer   Food Insecurity   Within the past 12 months, you worried that your food would run out before you got the money to buy more. Pt Unable   Within the past 12 months, the food you bought just didn't last and you didn't have money to get more. Pt Unable   Stress   Do you feel stress - tense, restless, nervous, or anxious, or unable to sleep at night because your mind is troubled all the time - these days? Pt Unable   Social Isolation   How often do you feel lonely or isolated from those around you?  Patient  unable to answer   Alcohol Use   Q1: How often do you have a drink containing alcohol? Pt Unable   Q2: How many drinks containing alcohol do you have on a typical day when you are drinking? Pt Unable   Q3: How often do you have six or more drinks on one occasion? Pt Unable   Utilities   In the past 12 months has the electric, gas, oil, or water company threatened to shut off services in your home? Pt Unable   Health Literacy   How often do you need to have someone help you when you read instructions, pamphlets, or other written material from your doctor or pharmacy? Patient unable to respond   OTHER   Name(s) of People in Home pt's grandchildren

## 2025-02-04 NOTE — SUBJECTIVE & OBJECTIVE
Past Medical History:   Diagnosis Date    Acid reflux     Anemia     Arthritis     Atrial fibrillation     Carpal tunnel syndrome     Cataract     Dry mouth     GERD (gastroesophageal reflux disease)     Hyperlipidemia     Hypertension     Insomnia     Other osteoporosis without current pathological fracture 3/14/2018    PONV (postoperative nausea and vomiting)     short term       Past Surgical History:   Procedure Laterality Date    APPENDECTOMY      CARDIOVERSION N/A 2/8/2019    Procedure: Cardioversion;  Surgeon: Emmanuel Matthew MD;  Location: [facility] CATH LAB;  Service: Cardiology;  Laterality: N/A;    COLONOSCOPY      COLONOSCOPY N/A 5/28/2020    Procedure: COLONOSCOPY;  Surgeon: Gopi Brooke MD;  Location: [facility] ENDO;  Service: Endoscopy;  Laterality: N/A;    COLONOSCOPY N/A 7/7/2020    Procedure: COLONOSCOPY;  Surgeon: Gopi Brooke MD;  Location: [facility];  Service: Endoscopy;  Laterality: N/A;    ESOPHAGOGASTRODUODENOSCOPY N/A 5/28/2020    Procedure: EGD (ESOPHAGOGASTRODUODENOSCOPY);  Surgeon: Gopi Brooke MD;  Location: [facility];  Service: Endoscopy;  Laterality: N/A;    FOOT SURGERY Left     HAND SURGERY      HERNIA REPAIR      x2    HYSTERECTOMY  age 32    fibroids    OOPHORECTOMY         Review of patient's allergies indicates:   Allergen Reactions    Asa [aspirin] Nausea Only       Current Facility-Administered Medications on File Prior to Encounter   Medication    [DISCONTINUED] acetaminophen suppository 650 mg    [DISCONTINUED] acetaminophen tablet 650 mg    [DISCONTINUED] atorvastatin tablet 40 mg    [DISCONTINUED] bisacodyL suppository 10 mg    [DISCONTINUED] calcium carbonate 200 mg calcium (500 mg) chewable tablet 500 mg    [DISCONTINUED] clopidogreL tablet 75 mg    [DISCONTINUED] dextrose 50% injection 12.5 g    [DISCONTINUED] electrolytes-dextrose (Pedialyte) oral solution 200 mL    [DISCONTINUED] enoxaparin injection 30 mg    [DISCONTINUED] esmolol 2000 mg in sodium  chloride 0.9% 100 mL (20 mg/mL)    [DISCONTINUED] glucagon (human recombinant) injection 1 mg    [DISCONTINUED] haloperidol lactate injection 1 mg    [DISCONTINUED] hydroxychloroquine tablet 200 mg    [DISCONTINUED] hydrOXYzine pamoate capsule 25 mg    [DISCONTINUED] insulin aspart U-100 pen 0-5 Units    [DISCONTINUED] labetaloL injection 10 mg    [DISCONTINUED] metoprolol tartrate (LOPRESSOR) tablet 50 mg    [DISCONTINUED] mupirocin 2 % ointment    [DISCONTINUED] ondansetron injection 4 mg    [DISCONTINUED] prochlorperazine injection Soln 5 mg    [DISCONTINUED] senna-docusate 8.6-50 mg per tablet 1 tablet    [DISCONTINUED] sodium chloride 0.9% flush 10 mL     Current Outpatient Medications on File Prior to Encounter   Medication Sig    acetaminophen (TYLENOL) 650 MG TbSR Take 1 tablet (650 mg total) by mouth 3 (three) times daily as needed.    albuterol (PROVENTIL/VENTOLIN HFA) 90 mcg/actuation inhaler INHALE 2 PUFFS INTO THE LUNGS EVERY 6 HOURS AS NEEDED WHEEZING RESCUE    apixaban (ELIQUIS) 2.5 mg Tab Take 1 tablet (2.5 mg total) by mouth 2 (two) times daily.    aspirin 81 MG Chew Take 1 tablet (81 mg total) by mouth once daily.    baclofen (LIORESAL) 5 mg Tab tablet Take 2 tablets (10 mg total) by mouth after lunch for 3 days, THEN 1 tablet (5 mg total) after lunch for 2 days, THEN 0.5 tablets (2.5 mg total) after lunch for 2 days.    benzonatate (TESSALON) 200 MG capsule Take 1 capsule (200 mg total) by mouth 3 (three) times daily as needed for Cough.    blood pressure test kit-large Kit 1 kit by Misc.(Non-Drug; Combo Route) route 2 (two) times daily as needed.    blood sugar diagnostic (TRUE METRIX GLUCOSE TEST STRIP) Strp Check BS once daily    blood-glucose meter (TRUE METRIX GLUCOSE METER) kit Use to check BS once daily    diltiaZEM (CARDIZEM CD) 180 MG 24 hr capsule Take 1 capsule (180 mg total) by mouth once daily.    fluticasone propionate (FLONASE) 50 mcg/actuation nasal spray 1 spray to each nostril  twice daily for 7 days, then may continue 1 spray to each nostril ONCE daily as needed for sinus congestion.    furosemide (LASIX) 20 MG tablet Take 1 tablet (20 mg total) by mouth daily as needed (edema).    hydroxychloroquine (PLAQUENIL) 200 mg tablet Take 1 tablet (200 mg total) by mouth 2 (two) times daily.    lancets (LANCETS,THIN) Misc Check BS once daily    lisinopriL 10 MG tablet Take 1 tablet (10 mg total) by mouth every evening. for 120 doses    metoprolol succinate (TOPROL-XL) 50 MG 24 hr tablet Take 1 tablet (50 mg total) by mouth 2 (two) times daily.    nystatin (MYCOSTATIN) cream Apply topically 2 (two) times daily.    nystatin (MYCOSTATIN) powder Apply topically 4 (four) times daily.    polyethylene glycol (GLYCOLAX) 17 gram/dose powder Take 17 g by mouth once daily.    triamcinolone acetonide 0.1% (KENALOG) 0.1 % cream Apply topically 2 (two) times daily. for 7 days     Family History       Problem Relation (Age of Onset)    Arthritis Father    Asthma Daughter    Cancer Father, Sister    Diabetes Sister    Heart disease Sister, Brother    Leukemia Mother          Tobacco Use    Smoking status: Never     Passive exposure: Never    Smokeless tobacco: Never   Substance and Sexual Activity    Alcohol use: No     Alcohol/week: 0.0 standard drinks of alcohol    Drug use: No    Sexual activity: Not Currently     Partners: Male     Birth control/protection: Surgical     Review of Systems   Unable to perform ROS: Other     Objective:     Vital Signs (Most Recent):  Temp: 97.5 °F (36.4 °C) (02/04/25 0730)  Pulse: 91 (02/04/25 1030)  Resp: 15 (02/04/25 1030)  BP: (!) 151/114 (02/04/25 1030)  SpO2: 99 % (02/04/25 1030) Vital Signs (24h Range):  Temp:  [96.7 °F (35.9 °C)-98.8 °F (37.1 °C)] 97.5 °F (36.4 °C)  Pulse:  [] 91  Resp:  [0-32] 15  SpO2:  [82 %-100 %] 99 %  BP: ()/() 151/114     Weight: 51.8 kg (114 lb 3.2 oz)  Body mass index is 23.07 kg/m².    SpO2: 99 %         Intake/Output Summary  (Last 24 hours) at 2/4/2025 1113  Last data filed at 2/4/2025 0800  Gross per 24 hour   Intake 1523.37 ml   Output --   Net 1523.37 ml       Lines/Drains/Airways       Drain  Duration             Female External Urinary Catheter w/ Suction 02/03/25 0600 1 day              Peripheral Intravenous Line  Duration                  Peripheral IV - Single Lumen 02/02/25 0634 22 G Posterior;Right Hand 2 days         Peripheral IV - Single Lumen 02/03/25 0530 20 G Anterior;Left Forearm 1 day         Midline Catheter - Single Lumen 02/03/25 1702 Right basilic vein (medial side of arm)  <1 day         Peripheral IV - Single Lumen 02/03/25 2341 22 G Left;Posterior Hand <1 day                     Physical Exam  Constitutional:       General: She is not in acute distress.     Appearance: She is ill-appearing. She is not diaphoretic.   Eyes:      General:         Right eye: No discharge.         Left eye: No discharge.   Cardiovascular:      Rate and Rhythm: Tachycardia present. Rhythm irregular.      Heart sounds: Murmur heard.   Neurological:      Mental Status: She is alert.          Significant Labs: BMP:   Recent Labs   Lab 02/03/25  0533 02/03/25  1353 02/04/25  0337   * 108 106  107    140 140  140   K 4.1 4.6 5.0  4.9    107 108  108   CO2 19* 20* 18*  18*   BUN 13 16 22  22   CREATININE 0.8 1.1 1.4  1.4   CALCIUM 9.2 9.3 8.8  8.9   MG 1.9  --  1.9  1.9   , CMP   Recent Labs   Lab 02/03/25  0533 02/03/25  1353 02/04/25  0337    140 140  140   K 4.1 4.6 5.0  4.9    107 108  108   CO2 19* 20* 18*  18*   * 108 106  107   BUN 13 16 22  22   CREATININE 0.8 1.1 1.4  1.4   CALCIUM 9.2 9.3 8.8  8.9   PROT  --  6.8  --    ALBUMIN 2.9* 3.2* 2.9*   BILITOT  --  1.3*  --    ALKPHOS  --  57  --    AST  --  26  --    ALT  --  13  --    ANIONGAP 11 13 14  14   , CBC   Recent Labs   Lab 02/03/25  0534 02/03/25  1353 02/04/25  0337   WBC 7.04 8.50  8.50 9.27   HGB 12.8 12.0  12.0  "12.1   HCT 39.8 37.4  37.4 36.9*    253  253 228   , INR   Recent Labs   Lab 02/03/25  1353   INR 1.2   , Lipid Panel No results for input(s): "CHOL", "HDL", "LDLCALC", "TRIG", "CHOLHDL" in the last 48 hours., Troponin No results for input(s): "TROPONINIHS" in the last 48 hours., and All pertinent lab results from the last 24 hours have been reviewed.    Significant Imaging: Echocardiogram: Transthoracic echo (TTE) complete (Cupid Only):   Results for orders placed or performed during the hospital encounter of 01/29/25   Echo Saline Bubble? Yes; Ultrasound enhancing contrast? Yes   Result Value Ref Range    BSA 1.41 m2    Martin's Biplane MOD Ejection Fraction 28 %    A2C EF 26 %    A4C EF 32 %    LVOT stroke volume 35.1 cm3    LVIDd 3.4 (A) 3.5 - 6.0 cm    LV Systolic Volume 24.71 mL    LV Systolic Volume Index 17.5 mL/m2    LVIDs 2.6 2.1 - 4.0 cm    LV ESV A2C 79.54 mL    LV Diastolic Volume 47.31 mL    LV ESV A4C 56.20 mL    LV Diastolic Volume Index 33.55 mL/m2    LV EDV A2C [id number] mL    LV EDV A4C 31.71 mL    Left Ventricular End Systolic Volume by Teichholz Method 24.71 mL    Left Ventricular End Diastolic Volume by Teichholz Method 47.31 mL    IVS 1.1 0.6 - 1.1 cm    LVOT diameter 1.9 cm    LVOT area 2.8 cm2    FS 23.5 (A) 28 - 44 %    Left Ventricle Relative Wall Thickness 0.65 cm    PW 1.1 0.6 - 1.1 cm    LV mass 114.0 g    LV Mass Index 80.9 g/m2    MV Peak E Slick 1.15 m/s    TDI LATERAL 0.09 m/s    TDI SEPTAL 0.07 m/s    E/E' ratio 14 m/s    MV Peak A Slick 0.43 m/s    TR Max Slick 3.6 m/s    E/A ratio 2.67     E wave deceleration time 149 msec    LV SEPTAL E/E' RATIO 16.4 m/s    LV LATERAL E/E' RATIO 12.8 m/s    LVOT peak slick 0.6 m/s    Left Ventricular Outflow Tract Mean Velocity 0.40 cm/s    Left Ventricular Outflow Tract Mean Gradient 0.70 mmHg    RV- pimentel basal diam 2.2 cm    RV S' 8.81 cm/s    TAPSE 1.19 cm    RV/LV Ratio 0.65 cm    LA size 4.1 cm    Left Atrium Minor Axis 4.5 cm    " Left Atrium Major Axis 5.6 cm    LA Vol (MOD) 68 mL    MYRIAM (MOD) 48 mL/m2    RA area length vol 48.49 mL    RA Area 18.7 cm2    RA Major Axis 5.17 cm    RA Width 3.67 cm    RA Vol 51.53 mL    AV mean gradient 2 mmHg    AV peak gradient 3 mmHg    Ao peak vick 0.9 m/s    Ao VTI 20.1 cm    LVOT peak VTI 12.4 cm    AV valve area 1.7 cm²    AV Velocity Ratio 0.67     AV index (prosthetic) 0.62     MARGI by Velocity Ratio 1.9 cm²    Mr max vick 6.48 m/s    MV stenosis pressure 1/2 time 43.20 ms    MV valve area p 1/2 method 5.09 cm2    TV mean gradient 34 mmHg    Triscuspid Valve Regurgitation Peak Gradient 52 mmHg    PV PEAK VELOCITY 0.79 m/s    PV peak gradient 2 mmHg    Pulmonary Valve Mean Velocity 0.61 m/s    Sinus 2.53 cm    STJ 1.94 cm    Ascending aorta 2.61 cm    IVC diameter 1.92 cm    Mean e' 0.08 m/s    ZLVIDS -0.31     ZLVIDD -2.47     LA area A4C 19.98 cm2    LA area A2C 24.67 cm2    RVDD 2.15 cm    TV resting pulmonary artery pressure 60 mmHg    RV TB RVSP 12 mmHg    Est. RA pres 8 mmHg    Narrative      Left Ventricle: The left ventricle is normal in size. Mildly increased   wall thickness. Global hypokinesis present. There is severely reduced   systolic function. Quantitated ejection fraction is 28%. Unable to assess   diastolic function due to atrial fibrillation.    Right Ventricle: Right ventricular enlargement. Systolic function is   reduced.    Left Atrium: Left atrium is severely dilated. Agitated saline study of   the atrial septum is negative after vasalva maneuver, suggesting absence   of intracardiac shunt at the atrial level.    Right Atrium: Right atrium is dilated.    Mitral Valve: There is moderate regurgitation.  EROA 0.31 cm2    Tricuspid Valve: There is moderate regurgitation.    Pulmonary Artery: There is moderate pulmonary hypertension. The   estimated pulmonary artery systolic pressure is 60 mmHg.    IVC/SVC: Intermediate venous pressure at 8 mmHg.

## 2025-02-04 NOTE — PROGRESS NOTES
Progress Note  [facility] Pulmonary [facility] Care Medicine    Attending: Dr. Sanford  Admit Date: 2/3/2025  Today's Date: 02/04/2025    SUBJECTIVE:     Patient seen and examined this morning. Following commands this morning. Able to track with her eyes. Tachycardic through the night; BP stable.     OBJECTIVE:     Vital Signs Trends/Hx Reviewed  Vitals:    02/04/25 1200 02/04/25 1230 02/04/25 1236 02/04/25 1330   BP: (!) 133/102 (!) 186/111 (!) 141/117 138/78   Pulse: 93 109 (!) 112 98   Resp: 19 (!) 23 19 (!) 21   Temp:       TempSrc:       SpO2: 97% 96%  95%   Weight:       Height:         Physical Examination:  Physical Exam  Constitutional:       Comments: Not alert or oriented. Able to track with eyes.    HENT:      Head: Normocephalic and atraumatic.   Eyes:      Pupils: Pupils are equal, round, and reactive to light.   Cardiovascular:      Rate and Rhythm: Normal rate. Rhythm irregular.      Pulses: Normal pulses.      Heart sounds: Normal heart sounds.   Pulmonary:      Effort: Pulmonary effort is normal.      Comments: Minimal bibasilar crackles  Abdominal:      General: Abdomen is flat.      Palpations: Abdomen is soft.   Musculoskeletal:      Cervical back: Neck supple.   Skin:     General: Skin is warm and dry.   Neurological:      Mental Status: She is disoriented.      Comments: Unable to assess 2/2 to mental status; Minimal response to painful stimulation; tracking with eyes; unable to move right upper extremity    Laboratory:  Recent Labs   Lab 02/04/25  0337   WBC 9.27   RBC 4.16   HGB 12.1   HCT 36.9*      MCV 89   MCH 29.1   MCHC 32.8     Recent Labs   Lab 02/04/25  0337     140   K 5.0  4.9     108   CO2 18*  18*   BUN 22  22   CREATININE 1.4  1.4   CALCIUM 8.8  8.9   MG 1.9  1.9     Recent Labs   Lab 02/03/25  1832   PH 7.582*   PCO2 18.2*   PO2 161*   HCO3 17.1*   POCSATURATED 98.8         Chest Imaging:   Reviewed    ASSESSMENT & RECOMMENDATIONS   Eneida Majano is  a 84 y.o. female with a PMHx of atrial fibrillation (on eliquis), RA, T2DM, GERD, HTN, HLD, and recent R MCA acute ischemic stroke with occlusion of right M2 branch who presented to [facility] [place] on 2/3/25 as a transfer from OSH for atrial fibrillation with RVR. Upon presentation to [facility] [place], patient encephalopathic with minimal response to painful stimuli. Protecting airway and saturating well on 2L NC with episodes of apnea. Pt initially in afib with RVR to 140s and normotensive, however, later became hypotensive with MAPS in the 50s. Currently on esmolol gtt, and digoxin for rate control; cardiology following.     Neuro/Psych  #Right MCA Stroke   #Acute Encephalopathy  - patient presents with R MCA stroke seen on MRI at OSH with occlusion of right M2 branch; patient not a candidate for tPA due to last known normal >4.5 hours.   - initial deficits were dysphagia, left sided weakness and confusion  - AIS likely cardioembolic in origin due to eliquis non-adherence per chart review  - Neurology consulted; appreciate recommendations              -continue high intensity statin              - OK to start eliquis 2-14 days after stroke               - consider more permanent intervention for atrial fibrillation               -repeat CT head- evolving right MCA vascular territory infarct without evidence of hemorrhage               - EEG ordered per neuro   - 2/4/25- repeat MRI recommended by neuro due to new focal neuro deficit    CV  #Atrial Fibrillation with RVR  #HFrEF with EF 28%  - patient in afib with RVR to the 140s-150s with normal BP  - placed on esmolol gtt at OSH; continue  - lopressor 50 mg q6 oral; NG tube placed for administration  - dig level < 0.1; continue digoxin per cardiology  - continue atorvastatin 80 mg  - keep K >4, Mg >2    Pulm  #Acute Hypoxic Respiratory Failure  - CXR without focal consolidation- no concern for PNA at this time; WBC wnl  - weaned off from 2L to RA; will continue to monitor but currently  protecting airway    FEN/GI  - started tube feeds; monitor lytes for refeeding syndrome  - replete lytes prn  - bowel regimen with bisacodyl suppository, senna     RENAL  #SUSY  - Cr 1.4 increased from 1.1 yesterday  - likely pre-renal etiology  - will monitor with tube feeds     Heme  - no acute concerns      Endo  - SSI + Accuchecks  - Goal glucose 140-180 while in ICU    ID  - no acute concerns for infectious etiology as patient has been afebrile and without lekocytosis  -UA negative  - blood cultures negative     ICU Checklist  Feeding: tube feeds  Analgesia: None  Sedation: None  Thrombo PPX: eliquis 2.5 mg bid  Head of Bed: > 30 degrees  Ulcer PPX: famotidine  Glucose: goal 140-180s, hypoglycemic ppx  SAT/SBT: NA  Bowel Regimen: senna, bisacodyl  Indwelling Lines: PIV  Abx: none  Family Discussions: not present at bedside   Code Status: Full  Dispo: Continue ICU Care     Code Status: Full    Lowell Serna DO  [facility] Medicine PGY1     Pt seen with Dr. Sanford. Attending attestation to follow.    Pt seen and examined with Pulmonary/Critical Care team and this note was reviewed and validated with the following additional comments: BP better. HR .  Still on esmolol drip. Her neurologic exam is confusing.  Moves left arm more than right. Nurse feels that she is a little less responsive. I worry that she could have had another stroke.  Repeat head CT pending.    Critical Care time was spent validating the history and physical exam, reviewing the lab and imaging results, and discussing the care of the patient with the bedside nurse and the patient and/or surrogates. This critical care time did not overlap with that of any other provider or involve time for any procedures.  This patient has a high probability of sudden clinically significant deterioration which requires the highest level of physician preparedness to intervene urgently. I managed/supervised life or organ supporting interventions that required  frequent physician assessments. I devoted my full attention in the ICU to the direct care of this patient for this period of time. Organ systems which are failing and require intensive, critical care support are: neurologic, cardiovascular  Critical Care time: 45 minutes    Marcos Sanford MD  Phone [phone]

## 2025-02-04 NOTE — NURSING
RN contacted hospital medicine due to pt pharmacy informing RN that pharmacy has only 1 bag of esmolol left and pt will need an alternative.  Hospital medicine ordered aditional doses of PO metoprolol.  RN informed pt that she is unable to swallow PO meds due to lethargy.  MD ordered RN to place NGT to give PO meds.

## 2025-02-04 NOTE — NURSING
RN contacted by pharmacy informing RN that they were able to procure more bags of esmolol and the gtt could be continued.  MD notified.

## 2025-02-04 NOTE — PROGRESS NOTES
[place] - Intensive Care  Cardiology  Progress Note    Patient Name: Eneida Majano  MRN: [id number]  Admission Date: 2/3/2025  Hospital Length of Stay: 1 days  Code Status: Full Code   Attending Physician: Chhaya Vilchis*   Primary Care Physician: Paula Cooper NP  Expected Discharge Date: 2/7/2025  Principal Problem:<principal problem not specified>    Subjective:     Hospital Course:   02/03/2025 Per HPI   02/04/2025 HR 90s-120s on Esmolol and Digoxin IV. Patient unable to tolerate p.o. or follow commands.     Past Medical History:   Diagnosis Date    Acid reflux     Anemia     Arthritis     Atrial fibrillation     Carpal tunnel syndrome     Cataract     Dry mouth     GERD (gastroesophageal reflux disease)     Hyperlipidemia     Hypertension     Insomnia     Other osteoporosis without current pathological fracture 3/14/2018    PONV (postoperative nausea and vomiting)     short term       Past Surgical History:   Procedure Laterality Date    APPENDECTOMY      CARDIOVERSION N/A 2/8/2019    Procedure: Cardioversion;  Surgeon: Emmanuel Matthew MD;  Location: [facility] CATH LAB;  Service: Cardiology;  Laterality: N/A;    COLONOSCOPY      COLONOSCOPY N/A 5/28/2020    Procedure: COLONOSCOPY;  Surgeon: Gopi Brooke MD;  Location: [facility];  Service: Endoscopy;  Laterality: N/A;    COLONOSCOPY N/A 7/7/2020    Procedure: COLONOSCOPY;  Surgeon: Gopi Brooke MD;  Location: [facility];  Service: Endoscopy;  Laterality: N/A;    ESOPHAGOGASTRODUODENOSCOPY N/A 5/28/2020    Procedure: EGD (ESOPHAGOGASTRODUODENOSCOPY);  Surgeon: Gopi Brooke MD;  Location: [facility];  Service: Endoscopy;  Laterality: N/A;    FOOT SURGERY Left     HAND SURGERY      HERNIA REPAIR      x2    HYSTERECTOMY  age 32    fibroids    OOPHORECTOMY         Review of patient's allergies indicates:   Allergen Reactions    Asa [aspirin] Nausea Only       Current Facility-Administered Medications on File Prior to Encounter    Medication    [DISCONTINUED] acetaminophen suppository 650 mg    [DISCONTINUED] acetaminophen tablet 650 mg    [DISCONTINUED] atorvastatin tablet 40 mg    [DISCONTINUED] bisacodyL suppository 10 mg    [DISCONTINUED] calcium carbonate 200 mg calcium (500 mg) chewable tablet 500 mg    [DISCONTINUED] clopidogreL tablet 75 mg    [DISCONTINUED] dextrose 50% injection 12.5 g    [DISCONTINUED] electrolytes-dextrose (Pedialyte) oral solution 200 mL    [DISCONTINUED] enoxaparin injection 30 mg    [DISCONTINUED] esmolol 2000 mg in sodium chloride 0.9% 100 mL (20 mg/mL)    [DISCONTINUED] glucagon (human recombinant) injection 1 mg    [DISCONTINUED] haloperidol lactate injection 1 mg    [DISCONTINUED] hydroxychloroquine tablet 200 mg    [DISCONTINUED] hydrOXYzine pamoate capsule 25 mg    [DISCONTINUED] insulin aspart U-100 pen 0-5 Units    [DISCONTINUED] labetaloL injection 10 mg    [DISCONTINUED] metoprolol tartrate (LOPRESSOR) tablet 50 mg    [DISCONTINUED] mupirocin 2 % ointment    [DISCONTINUED] ondansetron injection 4 mg    [DISCONTINUED] prochlorperazine injection Soln 5 mg    [DISCONTINUED] senna-docusate 8.6-50 mg per tablet 1 tablet    [DISCONTINUED] sodium chloride 0.9% flush 10 mL     Current Outpatient Medications on File Prior to Encounter   Medication Sig    acetaminophen (TYLENOL) 650 MG TbSR Take 1 tablet (650 mg total) by mouth 3 (three) times daily as needed.    albuterol (PROVENTIL/VENTOLIN HFA) 90 mcg/actuation inhaler INHALE 2 PUFFS INTO THE LUNGS EVERY 6 HOURS AS NEEDED WHEEZING RESCUE    apixaban (ELIQUIS) 2.5 mg Tab Take 1 tablet (2.5 mg total) by mouth 2 (two) times daily.    aspirin 81 MG Chew Take 1 tablet (81 mg total) by mouth once daily.    baclofen (LIORESAL) 5 mg Tab tablet Take 2 tablets (10 mg total) by mouth after lunch for 3 days, THEN 1 tablet (5 mg total) after lunch for 2 days, THEN 0.5 tablets (2.5 mg total) after lunch for 2 days.    benzonatate (TESSALON) 200 MG capsule Take 1 capsule  (200 mg total) by mouth 3 (three) times daily as needed for Cough.    blood pressure test kit-large Kit 1 kit by Misc.(Non-Drug; Combo Route) route 2 (two) times daily as needed.    blood sugar diagnostic (TRUE METRIX GLUCOSE TEST STRIP) Strp Check BS once daily    blood-glucose meter (TRUE METRIX GLUCOSE METER) kit Use to check BS once daily    diltiaZEM (CARDIZEM CD) 180 MG 24 hr capsule Take 1 capsule (180 mg total) by mouth once daily.    fluticasone propionate (FLONASE) 50 mcg/actuation nasal spray 1 spray to each nostril twice daily for 7 days, then may continue 1 spray to each nostril ONCE daily as needed for sinus congestion.    furosemide (LASIX) 20 MG tablet Take 1 tablet (20 mg total) by mouth daily as needed (edema).    hydroxychloroquine (PLAQUENIL) 200 mg tablet Take 1 tablet (200 mg total) by mouth 2 (two) times daily.    lancets (LANCETS,THIN) Misc Check BS once daily    lisinopriL 10 MG tablet Take 1 tablet (10 mg total) by mouth every evening. for 120 doses    metoprolol succinate (TOPROL-XL) 50 MG 24 hr tablet Take 1 tablet (50 mg total) by mouth 2 (two) times daily.    nystatin (MYCOSTATIN) cream Apply topically 2 (two) times daily.    nystatin (MYCOSTATIN) powder Apply topically 4 (four) times daily.    polyethylene glycol (GLYCOLAX) 17 gram/dose powder Take 17 g by mouth once daily.    triamcinolone acetonide 0.1% (KENALOG) 0.1 % cream Apply topically 2 (two) times daily. for 7 days     Family History       Problem Relation (Age of Onset)    Arthritis Father    Asthma Daughter    Cancer Father, Sister    Diabetes Sister    Heart disease Sister, Brother    Leukemia Mother          Tobacco Use    Smoking status: Never     Passive exposure: Never    Smokeless tobacco: Never   Substance and Sexual Activity    Alcohol use: No     Alcohol/week: 0.0 standard drinks of alcohol    Drug use: No    Sexual activity: Not Currently     Partners: Male     Birth control/protection: Surgical     Review of  Systems   Unable to perform ROS: Other     Objective:     Vital Signs (Most Recent):  Temp: 97.5 °F (36.4 °C) (02/04/25 0730)  Pulse: 91 (02/04/25 1030)  Resp: 15 (02/04/25 1030)  BP: (!) 151/114 (02/04/25 1030)  SpO2: 99 % (02/04/25 1030) Vital Signs (24h Range):  Temp:  [96.7 °F (35.9 °C)-98.8 °F (37.1 °C)] 97.5 °F (36.4 °C)  Pulse:  [] 91  Resp:  [0-32] 15  SpO2:  [82 %-100 %] 99 %  BP: ()/() 151/114     Weight: 51.8 kg (114 lb 3.2 oz)  Body mass index is 23.07 kg/m².    SpO2: 99 %         Intake/Output Summary (Last 24 hours) at 2/4/2025 1113  Last data filed at 2/4/2025 0800  Gross per 24 hour   Intake 1523.37 ml   Output --   Net 1523.37 ml       Lines/Drains/Airways       Drain  Duration             Female External Urinary Catheter w/ Suction 02/03/25 0600 1 day              Peripheral Intravenous Line  Duration                  Peripheral IV - Single Lumen 02/02/25 0634 22 G Posterior;Right Hand 2 days         Peripheral IV - Single Lumen 02/03/25 0530 20 G Anterior;Left Forearm 1 day         Midline Catheter - Single Lumen 02/03/25 1702 Right basilic vein (medial side of arm)  <1 day         Peripheral IV - Single Lumen 02/03/25 2341 22 G Left;Posterior Hand <1 day                     Physical Exam  Constitutional:       General: She is not in acute distress.     Appearance: She is ill-appearing. She is not diaphoretic.   Eyes:      General:         Right eye: No discharge.         Left eye: No discharge.   Cardiovascular:      Rate and Rhythm: Tachycardia present. Rhythm irregular.      Heart sounds: Murmur heard.   Neurological:      Mental Status: She is alert.          Significant Labs: BMP:   Recent Labs   Lab 02/03/25  0533 02/03/25  1353 02/04/25  0337   * 108 106  107    140 140  140   K 4.1 4.6 5.0  4.9    107 108  108   CO2 19* 20* 18*  18*   BUN 13 16 22  22   CREATININE 0.8 1.1 1.4  1.4   CALCIUM 9.2 9.3 8.8  8.9   MG 1.9  --  1.9  1.9   , CMP  "  Recent Labs   Lab 02/03/25  0533 02/03/25  1353 02/04/25  0337    140 140  140   K 4.1 4.6 5.0  4.9    107 108  108   CO2 19* 20* 18*  18*   * 108 106  107   BUN 13 16 22  22   CREATININE 0.8 1.1 1.4  1.4   CALCIUM 9.2 9.3 8.8  8.9   PROT  --  6.8  --    ALBUMIN 2.9* 3.2* 2.9*   BILITOT  --  1.3*  --    ALKPHOS  --  57  --    AST  --  26  --    ALT  --  13  --    ANIONGAP 11 13 14  14   , CBC   Recent Labs   Lab 02/03/25  0534 02/03/25  1353 02/04/25  0337   WBC 7.04 8.50  8.50 9.27   HGB 12.8 12.0  12.0 12.1   HCT 39.8 37.4  37.4 36.9*    253  253 228   , INR   Recent Labs   Lab 02/03/25  1353   INR 1.2   , Lipid Panel No results for input(s): "CHOL", "HDL", "LDLCALC", "TRIG", "CHOLHDL" in the last 48 hours., Troponin No results for input(s): "TROPONINIHS" in the last 48 hours., and All pertinent lab results from the last 24 hours have been reviewed.    Significant Imaging: Echocardiogram: Transthoracic echo (TTE) complete (Cupid Only):   Results for orders placed or performed during the hospital encounter of 01/29/25   Echo Saline Bubble? Yes; Ultrasound enhancing contrast? Yes   Result Value Ref Range    BSA 1.41 m2    Martin's Biplane MOD Ejection Fraction 28 %    A2C EF 26 %    A4C EF 32 %    LVOT stroke volume 35.1 cm3    LVIDd 3.4 (A) 3.5 - 6.0 cm    LV Systolic Volume 24.71 mL    LV Systolic Volume Index 17.5 mL/m2    LVIDs 2.6 2.1 - 4.0 cm    LV ESV A2C 79.54 mL    LV Diastolic Volume 47.31 mL    LV ESV A4C 56.20 mL    LV Diastolic Volume Index 33.55 mL/m2    LV EDV A2C 48.242612594296230 mL    LV EDV A4C 31.71 mL    Left Ventricular End Systolic Volume by Teichholz Method 24.71 mL    Left Ventricular End Diastolic Volume by Teichholz Method 47.31 mL    IVS 1.1 0.6 - 1.1 cm    LVOT diameter 1.9 cm    LVOT area 2.8 cm2    FS 23.5 (A) 28 - 44 %    Left Ventricle Relative Wall Thickness 0.65 cm    PW 1.1 0.6 - 1.1 cm    LV mass 114.0 g    LV Mass Index 80.9 g/m2    MV " Peak E Slick 1.15 m/s    TDI LATERAL 0.09 m/s    TDI SEPTAL 0.07 m/s    E/E' ratio 14 m/s    MV Peak A Slick 0.43 m/s    TR Max Slick 3.6 m/s    E/A ratio 2.67     E wave deceleration time 149 msec    LV SEPTAL E/E' RATIO 16.4 m/s    LV LATERAL E/E' RATIO 12.8 m/s    LVOT peak slick 0.6 m/s    Left Ventricular Outflow Tract Mean Velocity 0.40 cm/s    Left Ventricular Outflow Tract Mean Gradient 0.70 mmHg    RV- pimentel basal diam 2.2 cm    RV S' 8.81 cm/s    TAPSE 1.19 cm    RV/LV Ratio 0.65 cm    LA size 4.1 cm    Left Atrium Minor Axis 4.5 cm    Left Atrium Major Axis 5.6 cm    LA Vol (MOD) 68 mL    MYRIAM (MOD) 48 mL/m2    RA area length vol 48.49 mL    RA Area 18.7 cm2    RA Major Axis 5.17 cm    RA Width 3.67 cm    RA Vol 51.53 mL    AV mean gradient 2 mmHg    AV peak gradient 3 mmHg    Ao peak slick 0.9 m/s    Ao VTI 20.1 cm    LVOT peak VTI 12.4 cm    AV valve area 1.7 cm²    AV Velocity Ratio 0.67     AV index (prosthetic) 0.62     MARGI by Velocity Ratio 1.9 cm²    Mr max slick 6.48 m/s    MV stenosis pressure 1/2 time 43.20 ms    MV valve area p 1/2 method 5.09 cm2    TV mean gradient 34 mmHg    Triscuspid Valve Regurgitation Peak Gradient 52 mmHg    PV PEAK VELOCITY 0.79 m/s    PV peak gradient 2 mmHg    Pulmonary Valve Mean Velocity 0.61 m/s    Sinus 2.53 cm    STJ 1.94 cm    Ascending aorta 2.61 cm    IVC diameter 1.92 cm    Mean e' 0.08 m/s    ZLVIDS -0.31     ZLVIDD -2.47     LA area A4C 19.98 cm2    LA area A2C 24.67 cm2    RVDD 2.15 cm    TV resting pulmonary artery pressure 60 mmHg    RV TB RVSP 12 mmHg    Est. RA pres 8 mmHg    Narrative      Left Ventricle: The left ventricle is normal in size. Mildly increased   wall thickness. Global hypokinesis present. There is severely reduced   systolic function. Quantitated ejection fraction is 28%. Unable to assess   diastolic function due to atrial fibrillation.    Right Ventricle: Right ventricular enlargement. Systolic function is   reduced.    Left Atrium: Left atrium  is severely dilated. Agitated saline study of   the atrial septum is negative after vasalva maneuver, suggesting absence   of intracardiac shunt at the atrial level.    Right Atrium: Right atrium is dilated.    Mitral Valve: There is moderate regurgitation.  EROA 0.31 cm2    Tricuspid Valve: There is moderate regurgitation.    Pulmonary Artery: There is moderate pulmonary hypertension. The   estimated pulmonary artery systolic pressure is 60 mmHg.    IVC/SVC: Intermediate venous pressure at 8 mmHg.       Assessment and Plan:     Brief HPI: Patient seen this morning on rounds, nonverbal at this time. HR 90s-120s AFLT.     Acute on chronic heart failure  TTE    Left Ventricle: The left ventricle is normal in size. Mildly increased   wall thickness. Global hypokinesis present. There is severely reduced   systolic function. Quantitated ejection fraction is 28%. Unable to assess   diastolic function due to atrial fibrillation.    Right Ventricle: Right ventricular enlargement. Systolic function is   reduced.    Left Atrium: Left atrium is severely dilated. Agitated saline study of   the atrial septum is negative after vasalva maneuver, suggesting absence   of intracardiac shunt at the atrial level.    Right Atrium: Right atrium is dilated.    Mitral Valve: There is moderate regurgitation.  EROA 0.31 cm2    Tricuspid Valve: There is moderate regurgitation.    Pulmonary Artery: There is moderate pulmonary hypertension. The   estimated pulmonary artery systolic pressure is 60 mmHg.    IVC/SVC: Intermediate venous pressure at 8 mmHg.    Previous TTEs with normal LVEF  Will proceed with ischemic evaluation, likely as OP given acute CVA  No CP reported PTA or since admission     Not overloaded on exam      Stroke due to occlusion of right middle cerebral artery    Neurology following, on DAPT and statin        Atrial fibrillation with RVR  Patient has paroxysmal (<7 days) atrial fibrillation. Patient is currently in Atrial  Flutter. [id number] Score: 5. The patients heart rate in the last 24 hours is as follows:  Pulse  Min: 78  Max: 155     Continue rate control strategy    Antiarrhythmics  esmolol 2000 mg in sodium chloride 0.9% 100 mL (20 mg/mL), Continuous, Intravenous  metoprolol injection 5 mg, Every 6 hours PRN, Intravenous  Digoxin 0.125 mg IV daily until tolerating p.o.    Anticoagulants  apixaban tablet 2.5 mg, 2 times daily, OralOn hold 2/2 acute CVA due to risk of hemorrhagic conversion. Start heparin gtt/Lovenox when cleared by Neurology     Plan  - Replete aamir with a goal of K>4, Mg >2  - Patient is not anticoagulated due to acute CVA  - Patient's afib is currently uncontrolled. Will adjust treatment as follows: Checking Digoxin level and will load with Digoxin      Essential hypertension  Patient's blood pressure range in the last 24 hours was: BP  Min: 76/53  Max: 188/125.The patient's inpatient anti-hypertensive regimen is listed below:  Current Antihypertensives  esmolol 2000 mg in sodium chloride 0.9% 100 mL (20 mg/mL), Continuous, Intravenous  metoprolol injection 5 mg, Every 6 hours PRN, Intravenous    Plan  - BP is controlled, no changes needed to their regimen  - Allowing for permissive HTN given acute CVA, unable to tolerate p.o. medications as well          VTE Risk Mitigation (From admission, onward)           Ordered     apixaban tablet 2.5 mg  2 times daily         02/04/25 0858     IP VTE HIGH RISK PATIENT  Once         02/03/25 1250     Place sequential compression device  Until discontinued         02/03/25 1250                    Tai Lemons NP  Cardiology  [place] - Intensive Care

## 2025-02-04 NOTE — NURSING
Late entry-   1235 - pt arrived to unit via stretcher with esmolol infusing at 350mcg/kg/min. Pt oriented to self and able to state birthday month and date but not year. Not fully engaging in conversation, off and on able to follow simple commands with repetitive attempts. Dr. Rush made aware of pt's arrival as well as ICU team.   consults In place for cardiology, neurology, pulm.   Orders for labs and CT in place. Order to continue esmolol.   1345- ALIRIO Lujan with Cardiology at bedside  1430- Dr. Avila at bedside  1443- unable to administer PO medications, pt not awake enough for PO intake. Attempted to give spoon of pedialyte but pt held it in her mouth and required multiple cueing to swallow. Will hold off on any PO intake for now until patient is more awake.   1520- Dr. Martin with neurology at bedside  1530- pt taken to CT.   1630- having difficulty obtaining pt's oxygen level with pulse ox.   1741- VBG obtained.   1827- ABG obtained.

## 2025-02-04 NOTE — ASSESSMENT & PLAN NOTE
Presented on January 28, 2025 with large stroke, patient was not a candidate for tPA versus thrombectomy  Tele stroke was consulted.  The all anticoagulants to be started after in 7 days on February 4th  Neurology consulted at [facility] after arrival on 02/03       Antithrombotics for secondary stroke prevention: Antiplatelets: None:  Large size stroke on 01/28 patient to wait total of 7 days to restart anticoagulants on 2/for    Statins for secondary stroke prevention and hyperlipidemia, if present:   Statins: Atorvastatin- 40 mg daily    Aggressive risk factor modification: HTN, DM, A-Fib, CAD     Rehab efforts: The patient has been evaluated by a stroke team provider and the therapy needs have been fully considered based off the presenting complaints and exam findings. The following therapy evaluations are needed: PT evaluate and treat, OT evaluate and treat, SLP evaluate and treat    Diagnostics ordered/pending: Carotid ultrasound to assess vasculature, CT scan of head without contrast to asses brain parenchyma, CTA Head to assess vasculature , HgbA1C to assess blood glucose levels, Lipid Profile to assess cholesterol levels, MRA head to assess vasculature, MRA neck/arch to assess vasculature, MRI head without contrast to assess brain parenchyma, TTE to assess cardiac function/status , TSH to assess thyroid function    VTE prophylaxis: None: Reason for No Pharmacological VTE Prophylaxis:  Due to large stroke per Neurology recommendation had [facility] patient to be started after 7 days (2/4    BP parameters: Infarct:  Out of the window at this moment to maintain blood pressure within normal values

## 2025-02-04 NOTE — PT/OT/SLP EVAL
Speech Language Pathology Evaluation  Bedside Swallow    Patient Name:  Eneida Majano   MRN:  [id number]  Admitting Diagnosis: Cerebrovascular accident (CVA) due to embolism of right middle cerebral artery    Recommendations:                 General Recommendations:  ongoing swallow eval, Pt needs to be NPO and MD should consider alternative source of nutrition and hydration   Diet recommendations:  NPO, NPO   Aspiration Precautions: daily and frequent oral care   General Precautions: Standard, fall, aphasia, aspiration  Communication strategies:   does not follow commands and jargon speech     Assessment:     Eneida Majano is a 84 y.o. female admitted with CVA who presents with dysphagia, impaired cognition and impaired communication.     History per MD      HPI: 84-year-old female with a history of anemia, atrial fibrillation/flutter (on Eliquis), rheumatoid arthritis, diabetes, gastroesophageal reflux disease, hypertension, hyperlipidemia, and insomnia , pt was transferred to [facility] on January 28 with aphasia/dysarthria, right lateral gaze, and left-sided weakness. CT head showed evidence of a right MCA distribution stroke along with other areas of infarct. CTA of the head and neck showed occlusion of the right M2 branch. She had Vascular Neurology tele-consultation and was felt not to be a thrombolytic candidate. She was also not a thrombectomy candidate with a large core infarct on imaging. MRI brain on 1/29 also showed the areas of infarct. She was admitted with right MCA stroke, atrial flutter/fibrillation with rapid ventricular response, and encephalopathy. Anticoagulation was held due to concern for potential hemorrhagic conversion. Also troponin was elevated and she had echocardiogram that showed EF 28% with decreased right ventricular systolic function and moderate pulmonary hypertension. Mentation improved somewhat by January 31. She was placed on Plavix with plans to resume  anticoagulation after 7 days. By February 1, she had increased confusion. She was more tachycardic. On the morning of February 3, tachycardia persisted and she was upgraded in status and started on esmolol infusion. She was seen by Cardiology. On the morning of February 3 she was awake but confused and not following commands. She was unable to swallow oral medicines. Blood pressure remained stable. Pt was transfer to Hospital Medicine at [facility] in ICU status for continued rate control with esmolol.        On day of transfer to [facility] :  February 3: Sodium 138, potassium 4.1, chloride 108, CO2 19, BUN 13, creatinine 0.8, glucose 125, magnesium 1.9, white blood cells 7.04, hemoglobin 12.8, hematocrit 39.8, platelets 256  -EKG showed atrial fibrillation with ventricular rate 123. T-wave abnormality.    January 31: AST 23, ALT 20  -abdominal ultrasound had no acute findings    January 30: CT head showed redemonstration of acute right MCA infarct. No acute intracranial hemorrhage.  -INR 1.1  -echocardiogram had EF 28%. Unable to assess diastolic function due to atrial fibrillation. Decreased right ventricular systolic function. Right atrium is dilated. Moderate mitral regurgitation. Moderate tricuspid regurgitation. Moderate pulmonary hypertension with estimated PA systolic pressure 60 mmHg. Intermediate venous pressure at mmHg.    January 29: MRI brain had areas of acute infarction in the right MCA distribution largest involving the right perisylvian and lateral right frontal regions and smaller lateral right parieto-occipital cortical infarct with associated localized mass effect including effacement of adjacent portions of the right sylvian fissure and overlying cortical sulci. Non acute posterior left frontal subcortical white matter infarct. Moderate presumed microvascular ischemic change         Past Medical History:   Diagnosis Date    Acid reflux     Anemia     Arthritis     Atrial  fibrillation     Carpal tunnel syndrome     Cataract     Dry mouth     GERD (gastroesophageal reflux disease)     Hyperlipidemia     Hypertension     Insomnia     Other osteoporosis without current pathological fracture 3/14/2018    PONV (postoperative nausea and vomiting)     short term       Past Surgical History:   Procedure Laterality Date    APPENDECTOMY      CARDIOVERSION N/A 2/8/2019    Procedure: Cardioversion;  Surgeon: Emmanuel Matthew MD;  Location: [facility] CATH LAB;  Service: Cardiology;  Laterality: N/A;    COLONOSCOPY      COLONOSCOPY N/A 5/28/2020    Procedure: COLONOSCOPY;  Surgeon: Gopi Brooke MD;  Location: [facility] ENDO;  Service: Endoscopy;  Laterality: N/A;    COLONOSCOPY N/A 7/7/2020    Procedure: COLONOSCOPY;  Surgeon: Gopi Brooke MD;  Location: [facility] ENDO;  Service: Endoscopy;  Laterality: N/A;    ESOPHAGOGASTRODUODENOSCOPY N/A 5/28/2020    Procedure: EGD (ESOPHAGOGASTRODUODENOSCOPY);  Surgeon: Gopi Brooke MD;  Location: [facility] ENDO;  Service: Endoscopy;  Laterality: N/A;    FOOT SURGERY Left     HAND SURGERY      HERNIA REPAIR      x2    HYSTERECTOMY  age 32    fibroids    OOPHORECTOMY         Social History: unknown     CT of the head: evolving right MCA vascular territory infarcts     Prior diet: unknown     Subjective     Consult received for clinical swallow eval/speech/lang eval this date, SLP did communicate with RN prior to eval/treat.  Patient goals: unknown     Pain/Comfort:  Pain Rating 1: 0/10 (unable to state)    Respiratory Status: Room air    Objective:   Pt seen in room, in ICU, she has her eyes open but she is not following commands and unable to state wants and needs.     Oral Musculature Evaluation  Oral Musculature: unable to assess due to poor participation/comprehension    Bedside Swallow Eval:   Consistencies Assessed:  Thin liquids ice chips, water by tsp   Puree pudding by tsp       Oral Phase:   Oral residue  Poor oral acceptance    Pharyngeal  Phase:   decreased hyolaryngeal excursion to palpation  delayed swallow initation    Compensatory Strategies  Daily and frequent oral care     Treatment: Unable to educate pt on ST role and diet precautions, no family present to discuss ST role     Goals:   Multidisciplinary Problems       SLP Goals       Not on file                    Plan:     Patient to be seen:  3 x/week   Plan of Care expires:  03/05/25  Plan of Care reviewed with:      SLP Follow-Up:  Yes       Discharge recommendations:   (TBD)   Barriers to Discharge:  nutritional status     Time Tracking:     SLP Treatment Date:   02/04/25  Speech Start Time:  1104  Speech Stop Time:  1116     Speech Total Time (min):  12 min    Billable Minutes: Eval Swallow and Oral Function 12    02/04/2025

## 2025-02-04 NOTE — NURSING
MD came to bedside and performed bedside echo.  MD wants to hold off on NGT and give IVP metoprolol once esmolol supply is used up.

## 2025-02-04 NOTE — NURSING
1058- Pt remains on Esmolol drip currently at 200mcg/kg/min. Pt responds to pain and remains disoriented to time, place, situation. Mumbled name this morning. Intermittently following simple commands with repetitive attempts but does not stay awake long enough to engage.  Remains NPO, AM PO meds held. MRI ordered. Pending MRI call to give slot time for test.   1148- NGTube placed to left nare, tolerated well. Mittens placed due to pt attempting to pull on Ngtube.   1221- MD ok for use of ngtube  1415- OT at bedside   1437- contacted [facility], still waiting on availability for spot, [facility] to contact nurse with time later today.  1500- Tube feedings initiated. Diabetisource at 10ml/hr, goal rate 20ml/hr.   1553- Page, tech with EEG called to communicate they will be here for EEG tomorrow morning.   1648- taken to MRI   1730- returned from MRI. Connected back to monitor

## 2025-02-04 NOTE — CONSULTS
[facility]  Consult Note    Date of Service: 2/4/2025  Patient seen at the request of: Marco Fiore MD    Reason for Consultation  stroke    Assessment:  Eneida Majano is a 84 y.o. female who presents with stroke in the setting of atrial fibrillation.    Patient's right M2 occulusion stroke is suggestive of an embolic etiology, likely cardioembolic given the patient's history.  Patient was prescribed Eliquis for atrial fibrillation, however, there is some concern for non-compliance.      Exam today is limited by the patient's participation.  She is notably dysarthric and not following commands.  Query altered mental status due to secondary etiology.  No leukocytosis.  Swelling appears relatively minimal on CT head from 1/30.  Will follow up CTH to be done today.  Consider delirium as well.    Differential also includes focal seizure.  Recommend routine EEG.    TTE (bubble) - EF 28%, irregularly irregular rhythm    On planning for discharge, patient should be on a high-intensity statin and Eliquis, if non-compliance is established and there is no indication of DOAC failure.  Ok to restart DOAC 2-14 days after stroke.  Plan will change if patient is able to undergo more permanent intervention to her atrial fibrillation.      Onset of symptoms was 1/28/2025.  Repeat CT head appears stable.      Exam today was significant for absence of withdrawal to noxious stimulation of the distal right extremity.        Plan:    - MRI brain wo contrast  - routine EEG  - delirium precautions (minimize disruptions during sleeping hours, reorient frequently)      A dictation device was used to produce this document. Use of such devices sometimes results in grammatical errors or replacement of words that sound similarly.     Signed:    Bakari Martin MD  Neurology/Epilepsy  02/04/2025 2:51 PM      HPI:  Eneida Majano is a 84 y.o. female with   Past Medical History:   Diagnosis Date    Acid reflux     Anemia      Arthritis     Atrial fibrillation     Carpal tunnel syndrome     Cataract     Dry mouth     GERD (gastroesophageal reflux disease)     Hyperlipidemia     Hypertension     Insomnia     Other osteoporosis without current pathological fracture 3/14/2018    PONV (postoperative nausea and vomiting)     short term     Patient is unable to provide any additional history.  She does not follow commands and was taken for repeat CT head.      This is the extent of the patient's complaints at this time.    From discharge/transfer summary from transferring hospital 2/3/2025):  84-year-old female with a past medical history of anemia, atrial fibrillation/flutter on diltiazem and Eliquis at home, GERD, dyslipidemia, hypertension, and insomnia.     She now comes as a transfer from [facility] ED due to stroke like symptoms that started one day prior to presentation at 9 PM on 1/28/25.  Her symptoms were dysphagia, left-sided weakness, and right lateral gaze. Upon presentation to the ED, she was still symptomatic.  The MRI machine at [facility] is broken, so she is being transferred to [facility] for MRI capability.  She had a completed tele-stroke consult done in the ED (please refer to Dr. Bhatt's note).     The patient's family are at the bedside and provide all of the history, as Ms. Majano is very lethargic and cannot talk to me or cooperate with exam.  Even with the nurse shaking her, she just stares ahead without interacting or speaking.  The family tells me that yesterday around 3pm, she would not answer her grandson, who found her door locked as well.  They broke the door down and found her lying on the ground incontinent of urine.  She was able to ask for a cup of water, but not get up.  He picked her up and carried her to the chair and called 911.  She also would not speak at that time as well.    Hospital Course:   Patient admitted to telemetry floor and started on neurovascular protective medications and evaluated by  Neurology.  Holding on anticoagulation at this time due to concerns for possible risk of hemorrhagic conversion and bleed.  Repeat CTA of the head recommended by Neurology to evaluate for any bleed.  Patient lethargic and poorly responsive when seen by medical team but by afternoon, PT/OT was working with patient and recommendations for high intensity therapy appreciated.  Case management consulted for assistance in placement.     1/31:  Patient's mental status greatly improved but still having significant lower extremity weakness.  Working with PT/OT and recommendations for tight intensity therapy appreciated.  CTA of the head shows no signs of acute bleed or hemorrhagic conversion.  Will plan on resuming anticoagulation on day 7 as per neurology recommendations.  Will start patient on Plavix and hold ASA for now.  Continue atorvastatin and transition from IV to p.o. metoprolol.  Awaiting SNF/rehab placement.     2/3:  Patient went into AFib with RVR overnight.  Echocardiogram shows reduced ejection fraction of 26%.  Cardiology recommended initiation of esmolol GTT and patient transferred to ICU for further evaluation and management of AFib with RVR.  Patient remains tachycardic despite maximum as well dose, blood pressure is within normal limits at this time.  Patient's mental status unchanged and remains severely confused, responds poorly to questions and commands, and still has significant focal neurological deficits.  After further discussion with Cardiology, recommendation to transfer to higher level care were Cardiology will be available to assist in management.  Patient breathing comfortably on 2 L O2 via nasal cannula.  Patient is stable for transfer via EMS to [facility] for higher level of care and access to Interventional Cardiology.       TSH   Date Value Ref Range Status   02/03/2025 3.156 0.400 - 4.000 uIU/mL Final   01/28/2025 1.451 0.400 - 4.000 uIU/mL Final     Hemoglobin A1C   Date Value Ref Range  Status   01/29/2025 5.7 (H) 4.0 - 5.6 % Final     Comment:     ADA Screening Guidelines:  5.7-6.4%  Consistent with prediabetes  >or=6.5%  Consistent with diabetes    High levels of fetal hemoglobin interfere with the HbA1C  assay. Heterozygous hemoglobin variants (HbS, HgC, etc)do  not significantly interfere with this assay.   However, presence of multiple variants may affect accuracy.     10/01/2024 5.8 (H) 4.0 - 5.6 % Final     Comment:     ADA Screening Guidelines:  5.7-6.4%  Consistent with prediabetes  >or=6.5%  Consistent with diabetes    High levels of fetal hemoglobin interfere with the HbA1C  assay. Heterozygous hemoglobin variants (HbS, HgC, etc)do  not significantly interfere with this assay.   However, presence of multiple variants may affect accuracy.           Review of Systems:  ROS negative unless noted in HPI    Past Surgical History:  Past Surgical History:   Procedure Laterality Date    APPENDECTOMY      CARDIOVERSION N/A 2/8/2019    Procedure: Cardioversion;  Surgeon: Emmanuel Matthew MD;  Location: [facility] CATH LAB;  Service: Cardiology;  Laterality: N/A;    COLONOSCOPY      COLONOSCOPY N/A 5/28/2020    Procedure: COLONOSCOPY;  Surgeon: Gopi Brooke MD;  Location: [facility];  Service: Endoscopy;  Laterality: N/A;    COLONOSCOPY N/A 7/7/2020    Procedure: COLONOSCOPY;  Surgeon: Gopi Brooke MD;  Location: [facility];  Service: Endoscopy;  Laterality: N/A;    ESOPHAGOGASTRODUODENOSCOPY N/A 5/28/2020    Procedure: EGD (ESOPHAGOGASTRODUODENOSCOPY);  Surgeon: Gopi Brooke MD;  Location: [facility];  Service: Endoscopy;  Laterality: N/A;    FOOT SURGERY Left     HAND SURGERY      HERNIA REPAIR      x2    HYSTERECTOMY  age 32    fibroids    OOPHORECTOMY         Family History:  Family History   Problem Relation Name Age of Onset    Arthritis Father      Cancer Father          liver    Leukemia Mother      Diabetes Sister      Cancer Sister          throat    Heart disease  Sister      Heart disease Brother      Asthma Daughter      Colon cancer Neg Hx         Social History:  Social History     Tobacco Use    Smoking status: Never     Passive exposure: Never    Smokeless tobacco: Never   Substance Use Topics    Alcohol use: No     Alcohol/week: 0.0 standard drinks of alcohol    Drug use: No       Allergies:  Asa [aspirin]    Outpatient Medications:  Prior to Admission medications    Medication Sig Start Date End Date Taking? Authorizing Provider   acetaminophen (TYLENOL) 650 MG TbSR Take 1 tablet (650 mg total) by mouth 3 (three) times daily as needed. 3/14/19   Sylwia Barrera NP   albuterol (PROVENTIL/VENTOLIN HFA) 90 mcg/actuation inhaler INHALE 2 PUFFS INTO THE LUNGS EVERY 6 HOURS AS NEEDED WHEEZING RESCUE 5/9/23   Paula Cooper NP   apixaban (ELIQUIS) 2.5 mg Tab Take 1 tablet (2.5 mg total) by mouth 2 (two) times daily. 8/6/24   Paula Cooper NP   aspirin 81 MG Chew Take 1 tablet (81 mg total) by mouth once daily. 3/14/19 7/13/23  Sylwia Barrera NP   baclofen (LIORESAL) 5 mg Tab tablet Take 2 tablets (10 mg total) by mouth after lunch for 3 days, THEN 1 tablet (5 mg total) after lunch for 2 days, THEN 0.5 tablets (2.5 mg total) after lunch for 2 days. 10/29/24 11/5/24  Anthnoy Liao MD   benzonatate (TESSALON) 200 MG capsule Take 1 capsule (200 mg total) by mouth 3 (three) times daily as needed for Cough. 2/19/23   Andre Rubio NP   blood pressure test kit-large Kit 1 kit by Misc.(Non-Drug; Combo Route) route 2 (two) times daily as needed. 3/14/19   Sylwia Barrera NP   blood sugar diagnostic (TRUE METRIX GLUCOSE TEST STRIP) Strp Check BS once daily 11/15/24   Paula Cooper NP   blood-glucose meter (TRUE METRIX GLUCOSE METER) kit Use to check BS once daily 11/15/24 11/15/25  Paula Cooper NP   diltiaZEM (CARDIZEM CD) 180 MG 24 hr capsule Take 1 capsule (180 mg total) by mouth once daily. 1/28/25 1/28/26  Shadi Irwin MD   fluticasone  "propionate (FLONASE) 50 mcg/actuation nasal spray 1 spray to each nostril twice daily for 7 days, then may continue 1 spray to each nostril ONCE daily as needed for sinus congestion. 2/19/23   Andre Rubio NP   furosemide (LASIX) 20 MG tablet Take 1 tablet (20 mg total) by mouth daily as needed (edema). 10/3/24 10/3/25  Paula Cooper NP   hydroxychloroquine (PLAQUENIL) 200 mg tablet Take 1 tablet (200 mg total) by mouth 2 (two) times daily. 10/3/19   Billy Mahan MD   lancets (LANCETS,THIN) Misc Check BS once daily 11/15/24   Paula Cooper NP   lisinopriL 10 MG tablet Take 1 tablet (10 mg total) by mouth every evening. for 120 doses 10/29/24 2/26/25  Anthony Liao MD   metoprolol succinate (TOPROL-XL) 50 MG 24 hr tablet Take 1 tablet (50 mg total) by mouth 2 (two) times daily. 10/29/24 10/29/25  Anthony Liao MD   nystatin (MYCOSTATIN) cream Apply topically 2 (two) times daily. 10/3/24   Paula Cooper NP   nystatin (MYCOSTATIN) powder Apply topically 4 (four) times daily. 1/7/25   Paula Cooper NP   polyethylene glycol (GLYCOLAX) 17 gram/dose powder Take 17 g by mouth once daily. 2/3/22   Leona Boyer MD   triamcinolone acetonide 0.1% (KENALOG) 0.1 % cream Apply topically 2 (two) times daily. for 7 days 5/7/21 5/14/21  Paula Cooper NP       Physical exam:    Vitals: BP (!) 149/119   Pulse (!) 117   Temp 97.5 °F (36.4 °C) (Axillary)   Resp 15   Ht 4' 11" (1.499 m)   Wt 51.8 kg (114 lb 3.2 oz)   SpO2 96%   Breastfeeding No   BMI 23.07 kg/m²     General:   Sitting in chair, in no distress, well-nourished, well-developed, appears stated age.  Head/Neck:   Normocephalic,atraumatic  Pulm:  Non-labored breathing     Mental Status: Mild dysarthria, does not follow one step commands, irritable  CN:  II: blink to threat on OD  III, IV, VI: unable to completely assess  VII: Facial movement full and symmetric.   VIII: Hearing grossly normal to conversation.  IX, X: does not open mouth to " command  Motor: Normal bulk throughout all four extremities.   LUE: does not participate with exam, spontaneous movement appreciated  RUE:  does not participate with exam, spontaneous movement appreciated  LLE:  does not participate with exam, withdraws  RLE: does not participate with exam, withdraws  No tremors   Sensory: Does not withdraw to noxious stim on distal RUE  Reflexes: LUE: Biceps 2+ brachioradialis 2+  RUE: Biceps 2+, brachioradialis 2+  LLE: Knee 2+  RLE: Knee 2+  Coordination:  Unable to assess  Gait: bed bound    Imaging:  All pertinent imaging was personally reviewed.    On my personal review:   MRI shows right MCA distribution infarct (1/29), more recent CT (1/30) does not show significant unilateral swelling.      Results for orders placed during the hospital encounter of 01/28/25    MRI Brain Without Contrast    Narrative  EXAMINATION:  MRI BRAIN WITHOUT CONTRAST    CLINICAL HISTORY:  Stroke, follow up;    TECHNIQUE:  Brain studied using sagittal and axial T1, axial T2, FLAIR and DW Images.    COMPARISON:  CT head 01/28/2025    FINDINGS:  Areas of acute infarction are evident the largest of which right MCA distribution involving the anterior right perisylvian and lateral right frontal regions measuring approximately 4.9 x 2.9 cm and smaller lateral right parietooccipital cortical infarct measuring approximately 2.6 x 2.1 cm as seen on the prior CT.  Associated localized mass effect again noted including effacement of the adjacent portions ipsilateral right sylvian fissure and overlying cortical sulci.    Small possibly subacute or chronic posterior left frontal subcortical infarct measuring approximately 1.5 cm with small focus involvement of the overlying cortex noted.  Additional nonspecific T2 hyperintensities white matter may reflect moderate microvascular ischemic change.  Old lacunar infarcts and or incidental prominent perivascular spaces bilateral basal ganglia and thalami.  Presumed  microvascular ischemic change central ange.    Visualized paranasal sinuses are clear as are mastoid air cells.    Impression  Areas of acute infarction right MCA distribution largest involving the right perisylvian and lateral right frontal regions measuring 4.9 x 2.9 cm and smaller lateral right parietooccipital cortical infarct measuring 2.6 x 2.1 cm with associated localized mass effect including effacement of adjacent portions right sylvian fissure and overlying cortical sulci.    Nonacute posterior left frontal subcortical white matter infarct with small focus overlying cortical involvement.    Moderate presumed microvascular ischemic change white matter.      Electronically signed by: Saige Garcia MD  Date:    01/29/2025  Time:    09:03

## 2025-02-04 NOTE — PROGRESS NOTES
[facility] Medicine  Progress Note    Patient Name: Eneida Majano  MRN: [id number]  Patient Class: IP- Inpatient   Admission Date: 2/3/2025  Length of Stay: 1 days  Attending Physician: Chhaya Vilchis*  Primary Care Provider: Paula Cooper NP        Subjective     Principal Problem:Cerebrovascular accident (CVA) due to embolism of right middle cerebral artery        HPI:  84-year-old female with a history of anemia, atrial fibrillation/flutter (on Eliquis), rheumatoid arthritis, diabetes, gastroesophageal reflux disease, hypertension, hyperlipidemia, and insomnia , pt was transferred to [facility] on January 28 with aphasia/dysarthria, right lateral gaze, and left-sided weakness. CT head showed evidence of a right MCA distribution stroke along with other areas of infarct. CTA of the head and neck showed occlusion of the right M2 branch. She had Vascular Neurology tele-consultation and was felt not to be a thrombolytic candidate. She was also not a thrombectomy candidate with a large core infarct on imaging. MRI brain on 1/29 also showed the areas of infarct. She was admitted with right MCA stroke, atrial flutter/fibrillation with rapid ventricular response, and encephalopathy. Anticoagulation was held due to concern for potential hemorrhagic conversion. Also troponin was elevated and she had echocardiogram that showed EF 28% with decreased right ventricular systolic function and moderate pulmonary hypertension. Mentation improved somewhat by January 31. She was placed on Plavix with plans to resume anticoagulation after 7 days. By February 1, she had increased confusion. She was more tachycardic. On the morning of February 3, tachycardia persisted and she was upgraded in status and started on esmolol infusion. She was seen by Cardiology. On the morning of February 3 she was awake but confused and not following commands. She was unable to swallow oral medicines.  Blood pressure remained stable. Pt was transfer to Hospital Medicine at [facility] in ICU status for continued rate control with esmolol.       On day of transfer to [facility] :  February 3: Sodium 138, potassium 4.1, chloride 108, CO2 19, BUN 13, creatinine 0.8, glucose 125, magnesium 1.9, white blood cells 7.04, hemoglobin 12.8, hematocrit 39.8, platelets 256  -EKG showed atrial fibrillation with ventricular rate 123. T-wave abnormality.    January 31: AST 23, ALT 20  -abdominal ultrasound had no acute findings    January 30: CT head showed redemonstration of acute right MCA infarct. No acute intracranial hemorrhage.  -INR 1.1  -echocardiogram had EF 28%. Unable to assess diastolic function due to atrial fibrillation. Decreased right ventricular systolic function. Right atrium is dilated. Moderate mitral regurgitation. Moderate tricuspid regurgitation. Moderate pulmonary hypertension with estimated PA systolic pressure 60 mmHg. Intermediate venous pressure at mmHg.    January 29: MRI brain had areas of acute infarction in the right MCA distribution largest involving the right perisylvian and lateral right frontal regions and smaller lateral right parieto-occipital cortical infarct with associated localized mass effect including effacement of adjacent portions of the right sylvian fissure and overlying cortical sulci. Non acute posterior left frontal subcortical white matter infarct. Moderate presumed microvascular ischemic change     Overview/Hospital Course:  No notes on file    Interval History: does not follow command  Appreciates SLP rec's ,     Awaiting repeat MRI brain,   Appreciates neurology - EEG, repeat MRI brain  Updated patient daughter  over the phone, who reported patient at baseline is sufficient except driving,. Code status unknown but will find out from grad daughter that lives with patient, there is no MPOA. Questions and concerns addressed, we agreed to leave  patient as full code until code status is confirmed  by family.      Review of Systems   Unable to perform ROS: Mental status change     Objective:     Vital Signs (Most Recent):  Temp: 97.5 °F (36.4 °C) (02/04/25 0730)  Pulse: 78 (02/04/25 0830)  Resp: 17 (02/04/25 0830)  BP: 119/87 (02/04/25 0830)  SpO2: 97 % (02/04/25 0830) Vital Signs (24h Range):  Temp:  [96.7 °F (35.9 °C)-98.8 °F (37.1 °C)] 97.5 °F (36.4 °C)  Pulse:  [] 78  Resp:  [0-46] 17  SpO2:  [82 %-100 %] 97 %  BP: ()/() 119/87     Weight: 51.8 kg (114 lb 3.2 oz)  Body mass index is 23.07 kg/m².    Intake/Output Summary (Last 24 hours) at 2/4/2025 0850  Last data filed at 2/4/2025 0636  Gross per 24 hour   Intake 1523.37 ml   Output --   Net 1523.37 ml         Physical Exam  Constitutional:       General: She is not in acute distress.     Appearance: She is ill-appearing. She is not toxic-appearing.      Comments: Very cachectic   HENT:      Head: Normocephalic and atraumatic.   Cardiovascular:      Rate and Rhythm: Normal rate. Rhythm irregular.   Pulmonary:      Effort: Pulmonary effort is normal.      Breath sounds: Normal breath sounds.   Abdominal:      General: Abdomen is flat.      Palpations: Abdomen is soft.   Musculoskeletal:         General: No swelling or tenderness.      Cervical back: Normal range of motion.      Right lower leg: No edema.      Left lower leg: No edema.   Skin:     General: Skin is dry.      Capillary Refill: Capillary refill takes less than 2 seconds.      Coloration: Skin is pale.   Neurological:      Mental Status: She is disoriented.             Significant Labs: A1C:   Recent Labs   Lab 10/01/24  0816 01/29/25  2300   HGBA1C 5.8* 5.7*     ABGs:   Recent Labs   Lab 02/03/25  0536 02/03/25  1741 02/03/25  1832   PH 7.477* 7.414 7.582*   PCO2 31.0* 36.8 18.2*   HCO3 22.9* 23.5* 17.1*   POCSATURATED 65 54.0* 98.8   BE -1  --   --    PO2 31* 30.3* 161*     Blood Culture:   Recent Labs   Lab 02/03/25  9278  "  LABBLOO No Growth to date     CBC:   Recent Labs   Lab 02/03/25  0534 02/03/25  1353 02/04/25  0337   WBC 7.04 8.50  8.50 9.27   HGB 12.8 12.0  12.0 12.1   HCT 39.8 37.4  37.4 36.9*    253  253 228     CMP:   Recent Labs   Lab 02/03/25  0533 02/03/25  1353 02/04/25  0337    140 140  140   K 4.1 4.6 5.0  4.9    107 108  108   CO2 19* 20* 18*  18*   * 108 106  107   BUN 13 16 22  22   CREATININE 0.8 1.1 1.4  1.4   CALCIUM 9.2 9.3 8.8  8.9   PROT  --  6.8  --    ALBUMIN 2.9* 3.2* 2.9*   BILITOT  --  1.3*  --    ALKPHOS  --  57  --    AST  --  26  --    ALT  --  13  --    ANIONGAP 11 13 14  14     Cardiac Markers:   Recent Labs   Lab 02/03/25  1353   *     Lactic Acid:   Recent Labs   Lab 02/03/25  1353   LACTATE 1.7     Lipase:   Recent Labs   Lab 02/03/25  1353   LIPASE 14     Lipid Panel: No results for input(s): "CHOL", "HDL", "LDLCALC", "TRIG", "CHOLHDL" in the last 48 hours.  Magnesium:   Recent Labs   Lab 02/03/25  0533 02/04/25  0337   MG 1.9 1.9  1.9     Troponin:   Recent Labs   Lab 02/03/25  1353   TROPONINI 0.218*     TSH:   Recent Labs   Lab 02/03/25  1353   TSH 3.156     Urine Culture: No results for input(s): "LABURIN" in the last 48 hours.  Urine Studies: No results for input(s): "COLORU", "APPEARANCEUA", "PHUR", "SPECGRAV", "PROTEINUA", "GLUCUA", "KETONESU", "BILIRUBINUA", "OCCULTUA", "NITRITE", "UROBILINOGEN", "LEUKOCYTESUR", "RBCUA", "WBCUA", "BACTERIA", "SQUAMEPITHEL", "HYALINECASTS" in the last 48 hours.    Invalid input(s): "WRIGHTSUR"    Significant Imaging: I have reviewed all pertinent imaging results/findings within the past 24 hours.    Assessment and Plan     ACP (advance care planning)  Advance Care Planning    Date: 02/04/2025    Code Status  Code status unknown. we agreed to leave patient as full code until code status is confirmed  by family./     A total of 20 min was spent on advance care planning, goals of care discussion, emotional " support, formulating and communicating prognosis and exploring burden/benefit of various approaches of treatment. This discussion occurred on a fully voluntary basis with the verbal consent of the patient and/or family.            Acute on chronic heart failure  Cardiology was consulted currently patient is on esmolol drip.  Beta blockers scheduled  Patient is a euvolemic  Patient received loading dose of digoxin for AFib with RVR uncontrolled  Echo Saline Bubble? Yes; Ultrasound enhancing contrast? Yes    Result Date: 1/30/2025    Left Ventricle: The left ventricle is normal in size. Mildly increased   wall thickness. Global hypokinesis present. There is severely reduced   systolic function. Quantitated ejection fraction is 28%. Unable to assess   diastolic function due to atrial fibrillation.    Right Ventricle: Right ventricular enlargement. Systolic function is   reduced.    Left Atrium: Left atrium is severely dilated. Agitated saline study of   the atrial septum is negative after vasalva maneuver, suggesting absence   of intracardiac shunt at the atrial level.    Right Atrium: Right atrium is dilated.    Mitral Valve: There is moderate regurgitation.  EROA 0.31 cm2    Tricuspid Valve: There is moderate regurgitation.    Pulmonary Artery: There is moderate pulmonary hypertension. The   estimated pulmonary artery systolic pressure is 60 mmHg.    IVC/SVC: Intermediate venous pressure at 8 mmHg.         Acute encephalopathy  2/3 at hospitals sent for stat CT head  -TSH T4 ammonia levels  -a neurology consulted      Cerebrovascular accident (CVA) due to embolism of right middle cerebral artery  Presented on January 28, 2025 with large stroke, patient was not a candidate for tPA versus thrombectomy  Tele stroke was consulted.  The all anticoagulants to be started after in 7 days on February 4th  Neurology consulted at hospitals after arrival on 02/03       Antithrombotics for secondary stroke prevention:  Antiplatelets: None:  Large size stroke on 01/28 patient to wait total of 7 days to restart anticoagulants on 2/for    Statins for secondary stroke prevention and hyperlipidemia, if present:   Statins: Atorvastatin- 40 mg daily    Aggressive risk factor modification: HTN, DM, A-Fib, CAD     Rehab efforts: The patient has been evaluated by a stroke team provider and the therapy needs have been fully considered based off the presenting complaints and exam findings. The following therapy evaluations are needed: PT evaluate and treat, OT evaluate and treat, SLP evaluate and treat    Diagnostics ordered/pending: Carotid ultrasound to assess vasculature, CT scan of head without contrast to asses brain parenchyma, CTA Head to assess vasculature , HgbA1C to assess blood glucose levels, Lipid Profile to assess cholesterol levels, MRA head to assess vasculature, MRA neck/arch to assess vasculature, MRI head without contrast to assess brain parenchyma, TTE to assess cardiac function/status , TSH to assess thyroid function    VTE prophylaxis: None: Reason for No Pharmacological VTE Prophylaxis:  Due to large stroke per Neurology recommendation had [facility] patient to be started after 7 days (2/4    BP parameters: Infarct:  Out of the window at this moment to maintain blood pressure within normal values    Stroke due to occlusion of right middle cerebral artery  Presented on January 28, 2025 with large stroke, patient was not a candidate for tPA versus thrombectomy  Tele stroke was consulted.  The all anticoagulants to be started after in 7 days on February 4th  Neurology consulted at [facility] after arrival on 02/03       Antithrombotics for secondary stroke prevention: Antiplatelets: None:  Large size stroke on 01/28 patient to wait total of 7 days to restart anticoagulants on 2/for    Statins for secondary stroke prevention and hyperlipidemia, if present:   Statins: Atorvastatin- 40 mg daily    Aggressive  risk factor modification: HTN, DM, A-Fib, CAD     Rehab efforts: The patient has been evaluated by a stroke team provider and the therapy needs have been fully considered based off the presenting complaints and exam findings. The following therapy evaluations are needed: PT evaluate and treat, OT evaluate and treat, SLP evaluate and treat    Diagnostics ordered/pending: Carotid ultrasound to assess vasculature, CT scan of head without contrast to asses brain parenchyma, CTA Head to assess vasculature , HgbA1C to assess blood glucose levels, Lipid Profile to assess cholesterol levels, MRA head to assess vasculature, MRA neck/arch to assess vasculature, MRI head without contrast to assess brain parenchyma, TTE to assess cardiac function/status , TSH to assess thyroid function    VTE prophylaxis: None: Reason for No Pharmacological VTE Prophylaxis:  Due to large stroke per Neurology recommendation had [facility] patient to be started after 7 days (2/4    BP parameters: Infarct:  Out of the window at this moment to maintain blood pressure within normal values        Atrial fibrillation with RVR  Patient has paroxysmal (<7 days) atrial fibrillation. Patient is currently in atrial fibrillation. [id number] Score: 5. The patients heart rate in the last 24 hours is as follows:  Pulse  Min: 110  Max: 250     Antiarrhythmics  esmolol 2000 mg in sodium chloride 0.9% 100 mL (20 mg/mL), Continuous, Intravenous  metoprolol tartrate (LOPRESSOR) tablet 50 mg, Every 6 hours, Oral    Anticoagulants       Plan  - Replete lytes with a goal of K>4, Mg >2  - Patient is not anticoagulated due to recent history of massive stroke, after clearance from Neurology can consider starting patient on anticoagulated on 2/for  - Patient's afib is currently uncontrolled. Will adjust treatment as follows:  Cardiology consulted currently on esmolol drip, digoxin level than load with digoxin          Essential hypertension  Patient's blood  pressure range in the last 24 hours was: BP  Min: 114/94  Max: 189/116.The patient's inpatient anti-hypertensive regimen is listed below:  Current Antihypertensives  esmolol 2000 mg in sodium chloride 0.9% 100 mL (20 mg/mL), Continuous, Intravenous  labetaloL injection 10 mg, Every 6 hours PRN, Intravenous  metoprolol tartrate (LOPRESSOR) tablet 50 mg, Every 6 hours, Oral    Plan  - BP is controlled, no changes needed to their regimen  - controlled current medications are to control AFib RVR    Gastroesophageal reflux disease  On famotidine       T2DM (type 2 diabetes mellitus)  Patient's FSGs are controlled on current medication regimen.  Last A1c reviewed-   Lab Results   Component Value Date    HGBA1C 5.7 (H) 01/29/2025     Most recent fingerstick glucose reviewed-   Recent Labs   Lab 02/03/25  0300 02/03/25  0537 02/03/25  0814 02/03/25  1244   POCTGLUCOSE 137* 121* 126* 102     Current correctional scale  Low  Maintain anti-hyperglycemic dose as follows-   Antihyperglycemics (From admission, onward)      Start     Stop Route Frequency Ordered    02/03/25 1413  insulin aspart U-100 pen 0-5 Units         -- SubQ Every 6 hours PRN 02/03/25 1313          Hold Oral hypoglycemics while patient is in the hospital.      VTE Risk Mitigation (From admission, onward)           Ordered     apixaban tablet 2.5 mg  2 times daily         02/04/25 0858     IP VTE HIGH RISK PATIENT  Once         02/03/25 1250     Place sequential compression device  Until discontinued         02/03/25 1250                    Discharge Planning   DELORES: 2/7/2025     Code Status: Full Code   Medical Readiness for Discharge Date:   Discharge Plan A: Skilled Nursing Facility            Critical care time spent on the evaluation and treatment of severe organ dysfunction, review of pertinent labs and imaging studies, discussions with consulting providers and discussions with patient/family: 45 minutes.            Chhaya Vilchis MD  Department  [facility] Medicine   [place] - Intensive Care

## 2025-02-04 NOTE — ASSESSMENT & PLAN NOTE
Patient's blood pressure range in the last 24 hours was: BP  Min: 76/53  Max: 188/125.The patient's inpatient anti-hypertensive regimen is listed below:  Current Antihypertensives  esmolol 2000 mg in sodium chloride 0.9% 100 mL (20 mg/mL), Continuous, Intravenous  metoprolol injection 5 mg, Every 6 hours PRN, Intravenous    Plan  - BP is controlled, no changes needed to their regimen  - Allowing for permissive HTN given acute CVA, unable to tolerate p.o. medications as well

## 2025-02-05 LAB
ANION GAP SERPL CALC-SCNC: 11 MMOL/L (ref 8–16)
BACTERIA #/AREA URNS HPF: ABNORMAL /HPF
BILIRUB UR QL STRIP: NEGATIVE
BUN SERPL-MCNC: 29 MG/DL (ref 8–23)
CALCIUM SERPL-MCNC: 8.5 MG/DL (ref 8.7–10.5)
CHLORIDE SERPL-SCNC: 109 MMOL/L (ref 95–110)
CLARITY UR: ABNORMAL
CO2 SERPL-SCNC: 17 MMOL/L (ref 23–29)
COLOR UR: YELLOW
CREAT SERPL-MCNC: 1.2 MG/DL (ref 0.5–1.4)
EST. GFR  (NO RACE VARIABLE): 45 ML/MIN/1.73 M^2
GLUCOSE SERPL-MCNC: 96 MG/DL (ref 70–110)
GLUCOSE UR QL STRIP: NEGATIVE
HGB UR QL STRIP: ABNORMAL
HYALINE CASTS #/AREA URNS LPF: 0 /LPF
KETONES UR QL STRIP: ABNORMAL
LEUKOCYTE ESTERASE UR QL STRIP: ABNORMAL
MAGNESIUM SERPL-MCNC: 1.9 MG/DL (ref 1.6–2.6)
MICROSCOPIC COMMENT: ABNORMAL
NITRITE UR QL STRIP: NEGATIVE
OHS QRS DURATION: 74 MS
OHS QRS DURATION: 76 MS
OHS QTC CALCULATION: 475 MS
OHS QTC CALCULATION: 525 MS
PH UR STRIP: >8 [PH] (ref 5–8)
PHOSPHATE SERPL-MCNC: 3 MG/DL (ref 2.7–4.5)
POCT GLUCOSE: 107 MG/DL (ref 70–110)
POCT GLUCOSE: 118 MG/DL (ref 70–110)
POTASSIUM SERPL-SCNC: 4.8 MMOL/L (ref 3.5–5.1)
PROT UR QL STRIP: ABNORMAL
RBC #/AREA URNS HPF: 40 /HPF (ref 0–4)
SODIUM SERPL-SCNC: 137 MMOL/L (ref 136–145)
SP GR UR STRIP: >1.03 (ref 1–1.03)
TRI-PHOS CRY URNS QL MICRO: ABNORMAL
URN SPEC COLLECT METH UR: ABNORMAL
UROBILINOGEN UR STRIP-ACNC: ABNORMAL EU/DL
WBC #/AREA URNS HPF: >100 /HPF (ref 0–5)

## 2025-02-05 PROCEDURE — 25000003 PHARM REV CODE 250: Performed by: STUDENT IN AN ORGANIZED HEALTH CARE EDUCATION/TRAINING PROGRAM

## 2025-02-05 PROCEDURE — 94761 N-INVAS EAR/PLS OXIMETRY MLT: CPT

## 2025-02-05 PROCEDURE — 63600175 PHARM REV CODE 636 W HCPCS

## 2025-02-05 PROCEDURE — 25000003 PHARM REV CODE 250: Performed by: FAMILY MEDICINE

## 2025-02-05 PROCEDURE — 87086 URINE CULTURE/COLONY COUNT: CPT

## 2025-02-05 PROCEDURE — 20000000 HC ICU ROOM

## 2025-02-05 PROCEDURE — 83735 ASSAY OF MAGNESIUM: CPT

## 2025-02-05 PROCEDURE — 63600175 PHARM REV CODE 636 W HCPCS: Performed by: STUDENT IN AN ORGANIZED HEALTH CARE EDUCATION/TRAINING PROGRAM

## 2025-02-05 PROCEDURE — 25000003 PHARM REV CODE 250

## 2025-02-05 PROCEDURE — 80048 BASIC METABOLIC PNL TOTAL CA: CPT

## 2025-02-05 PROCEDURE — 63600175 PHARM REV CODE 636 W HCPCS: Performed by: FAMILY MEDICINE

## 2025-02-05 PROCEDURE — 95822 EEG COMA OR SLEEP ONLY: CPT

## 2025-02-05 PROCEDURE — 84100 ASSAY OF PHOSPHORUS: CPT

## 2025-02-05 PROCEDURE — 81000 URINALYSIS NONAUTO W/SCOPE: CPT

## 2025-02-05 PROCEDURE — 25000003 PHARM REV CODE 250: Performed by: INTERNAL MEDICINE

## 2025-02-05 RX ORDER — MORPHINE SULFATE 2 MG/ML
2 INJECTION, SOLUTION INTRAMUSCULAR; INTRAVENOUS ONCE
Status: COMPLETED | OUTPATIENT
Start: 2025-02-05 | End: 2025-02-05

## 2025-02-05 RX ORDER — ENOXAPARIN SODIUM 100 MG/ML
1 INJECTION SUBCUTANEOUS EVERY 24 HOURS
Status: DISCONTINUED | OUTPATIENT
Start: 2025-02-05 | End: 2025-02-06

## 2025-02-05 RX ADMIN — METOPROLOL TARTRATE 50 MG: 50 TABLET, FILM COATED ORAL at 11:02

## 2025-02-05 RX ADMIN — Medication 200 ML: at 08:02

## 2025-02-05 RX ADMIN — APIXABAN 2.5 MG: 2.5 TABLET, FILM COATED ORAL at 08:02

## 2025-02-05 RX ADMIN — HYDROXYCHLOROQUINE SULFATE 200 MG: 200 TABLET, FILM COATED ORAL at 08:02

## 2025-02-05 RX ADMIN — FAMOTIDINE 20 MG: 10 INJECTION, SOLUTION INTRAVENOUS at 08:02

## 2025-02-05 RX ADMIN — Medication 200 ML: at 12:02

## 2025-02-05 RX ADMIN — METOPROLOL TARTRATE 50 MG: 50 TABLET, FILM COATED ORAL at 05:02

## 2025-02-05 RX ADMIN — MORPHINE SULFATE 2 MG: 2 INJECTION, SOLUTION INTRAMUSCULAR; INTRAVENOUS at 09:02

## 2025-02-05 RX ADMIN — Medication 200 ML: at 01:02

## 2025-02-05 RX ADMIN — ESMOLOL HYDROCHLORIDE IN SODIUM CHLORIDE 125 MCG/KG/MIN: 20 INJECTION INTRAVENOUS at 04:02

## 2025-02-05 RX ADMIN — Medication 200 ML: at 05:02

## 2025-02-05 RX ADMIN — METOPROLOL TARTRATE 50 MG: 50 TABLET, FILM COATED ORAL at 01:02

## 2025-02-05 RX ADMIN — DIGOXIN 0.12 MG: 125 TABLET ORAL at 08:02

## 2025-02-05 RX ADMIN — LISINOPRIL 10 MG: 2.5 TABLET ORAL at 08:02

## 2025-02-05 RX ADMIN — Medication 200 ML: at 09:02

## 2025-02-05 RX ADMIN — SENNOSIDES AND DOCUSATE SODIUM 1 TABLET: 8.6; 5 TABLET ORAL at 08:02

## 2025-02-05 RX ADMIN — Medication 200 ML: at 04:02

## 2025-02-05 RX ADMIN — HYDROXYCHLOROQUINE SULFATE 200 MG: 200 TABLET, FILM COATED ORAL at 09:02

## 2025-02-05 RX ADMIN — ENOXAPARIN SODIUM 50 MG: 60 INJECTION SUBCUTANEOUS at 04:02

## 2025-02-05 RX ADMIN — ESMOLOL HYDROCHLORIDE IN SODIUM CHLORIDE 150 MCG/KG/MIN: 20 INJECTION INTRAVENOUS at 08:02

## 2025-02-05 RX ADMIN — ATORVASTATIN CALCIUM 80 MG: 40 TABLET, FILM COATED ORAL at 08:02

## 2025-02-05 NOTE — CONSULTS
[facility] - Intensive Care  Gastroenterology  Consult Note    Patient Name: Eneida Majano  MRN: [id number]  Admission Date: 2/3/2025  Hospital Length of Stay: 2 days  Code Status: DNR   Attending Provider: Chhaya Vilchis*   Consulting Provider: Sidney Mason MD  Primary Care Physician: Paula Cooper NP  Principal Problem:Cerebrovascular accident (CVA) due to embolism of right middle cerebral artery    Inpatient consult to G[facility]enterology[facility]  Consult performed by: Sidney Mason MD  Consult ordered by: Chhaya Vilchis MD        Subjective:     HPI:  This is an 84-year-old lady with a history of AFib on Eliquis and multiple other comorbidities here for suspected stroke.    History is obtained from the patient's chart, bedside nursing, as well as patient's daughter and granddaughter.    Patient had a stroke in his currently unable to tolerate oral intake.  She does not seem to have much purposeful movement.  She has been seen by the neurologist.  She is continues on Eliquis currently.  She was evaluated by speech but no further evaluation could be obtained because the patient can not cooperate.  GI is asked for a feeding tube, peg placement.    Past Medical History:   Diagnosis Date    Acid reflux     Anemia     Arthritis     Atrial fibrillation     Carpal tunnel syndrome     Cataract     Dry mouth     GERD (gastroesophageal reflux disease)     Hyperlipidemia     Hypertension     Insomnia     Other osteoporosis without current pathological fracture 3/14/2018    PONV (postoperative nausea and vomiting)     short term       Past Surgical History:   Procedure Laterality Date    APPENDECTOMY      CARDIOVERSION N/A 2/8/2019    Procedure: Cardioversion;  Surgeon: Emmanuel Matthew MD;  Location: [facility] CATH LAB;  Service: Cardiology;  Laterality: N/A;    COLONOSCOPY      COLONOSCOPY N/A 5/28/2020    Procedure: COLONOSCOPY;  Surgeon: Gopi Brooke MD;  Location: [facility];  Service:  Endoscopy;  Laterality: N/A;    COLONOSCOPY N/A 7/7/2020    Procedure: COLONOSCOPY;  Surgeon: Gopi Brooke MD;  Location: [facility];  Service: Endoscopy;  Laterality: N/A;    ESOPHAGOGASTRODUODENOSCOPY N/A 5/28/2020    Procedure: EGD (ESOPHAGOGASTRODUODENOSCOPY);  Surgeon: Gopi Brooke MD;  Location: [facility];  Service: Endoscopy;  Laterality: N/A;    FOOT SURGERY Left     HAND SURGERY      HERNIA REPAIR      x2    HYSTERECTOMY  age 32    fibroids    OOPHORECTOMY         Review of patient's allergies indicates:   Allergen Reactions    Asa [aspirin] Nausea Only     Family History       Problem Relation (Age of Onset)    Arthritis Father    Asthma Daughter    Cancer Father, Sister    Diabetes Sister    Heart disease Sister, Brother    Leukemia Mother          Tobacco Use    Smoking status: Never     Passive exposure: Never    Smokeless tobacco: Never   Substance and Sexual Activity    Alcohol use: No     Alcohol/week: 0.0 standard drinks of alcohol    Drug use: No    Sexual activity: Not Currently     Partners: Male     Birth control/protection: Surgical     Review of Systems   Unable to perform ROS: Mental status change     Objective:     Vital Signs (Most Recent):  Temp: 98.1 °F (36.7 °C) (02/05/25 1100)  Pulse: 91 (02/05/25 1200)  Resp: 18 (02/05/25 1200)  BP: (!) 140/92 (02/05/25 1200)  SpO2: 98 % (02/05/25 1200) Vital Signs (24h Range):  Temp:  [97.3 °F (36.3 °C)-100.7 °F (38.2 °C)] 98.1 °F (36.7 °C)  Pulse:  [] 91  Resp:  [13-31] 18  SpO2:  [86 %-99 %] 98 %  BP: (104-155)/() 140/92     Weight: 51.8 kg (114 lb 3.2 oz) (02/04/25 2059)  Body mass index is 23.07 kg/m².      Intake/Output Summary (Last 24 hours) at 2/5/2025 1525  Last data filed at 2/5/2025 1333  Gross per 24 hour   Intake 1817.66 ml   Output 500 ml   Net 1317.66 ml       Lines/Drains/Airways       Drain  Duration             Female External Urinary Catheter w/ Suction 02/03/25 0600 2 days         NG/OG Tube 02/04/25  "1148 16 Fr. Left nostril 1 day              Peripheral Intravenous Line  Duration                  Peripheral IV - Single Lumen 02/03/25 2341 22 G Left;Posterior Hand 1 day         Midline Catheter - Single Lumen 02/04/25 2140 Left brachial vein other (see comments) <1 day                     Physical Exam  Vitals reviewed.   Constitutional:       Appearance: She is ill-appearing. She is not diaphoretic.      Comments: nonresponsive   HENT:      Head: Normocephalic and atraumatic.      Nose:      Comments: Ng in place  Eyes:      General: No scleral icterus.     Conjunctiva/sclera: Conjunctivae normal.   Cardiovascular:      Rate and Rhythm: Normal rate. Rhythm irregular.      Heart sounds: Normal heart sounds.   Pulmonary:      Effort: Pulmonary effort is normal. No respiratory distress.      Breath sounds: Normal breath sounds. No stridor.      Comments: Gurgling breath sounds  Abdominal:      General: There is no distension.      Palpations: Abdomen is soft. There is no mass.      Tenderness: There is no abdominal tenderness. There is no guarding or rebound.   Musculoskeletal:         General: No tenderness or deformity.      Cervical back: Neck supple.   Lymphadenopathy:      Cervical: No cervical adenopathy.   Skin:     General: Skin is warm and dry.      Findings: No rash.   Neurological:      Mental Status: She is disoriented.   Psychiatric:      Comments: Unable to assess          Significant Labs:  CBC:   Recent Labs   Lab 02/04/25  0337   WBC 9.27   HGB 12.1   HCT 36.9*        BMP:   Recent Labs   Lab 02/05/25  0542   GLU 96      K 4.8      CO2 17*   BUN 29*   CREATININE 1.2   CALCIUM 8.5*   MG 1.9     CMP:   Recent Labs   Lab 02/04/25  0337 02/05/25  0542     107 96   CALCIUM 8.8  8.9 8.5*   ALBUMIN 2.9*  --      140 137   K 5.0  4.9 4.8   CO2 18*  18* 17*     108 109   BUN 22  22 29*   CREATININE 1.4  1.4 1.2     Coagulation: No results for input(s): "PT", " ""INR", "APTT" in the last 48 hours.    Significant Imaging:  Imaging results within the past 24 hours have been reviewed.  Assessment/Plan:     Neuro  Acute encephalopathy  Secondary to stroke    Asked to eval for PEG tube    Recs  Would consider further goals of care discussion    Need to hold eliquis for 48 hours    IF GOC and family in agreement, tentatively plan PEG, earliest Friday, would need eliquis held tonight and forward.        Thank you for your consult. I will follow-up with patient. Please contact us if you have any additional questions.    Sidney Mason MD  Gastroenterology  [facility] - Intensive Care  "

## 2025-02-05 NOTE — ASSESSMENT & PLAN NOTE
Presented on January 28, 2025 with large stroke, patient was not a candidate for tPA versus thrombectomy  Tele stroke was consulted.  The all anticoagulants to be started after in 7 days on February 4th  Neurology consulted at [facility] after arrival on 02/03       Antithrombotics for secondary stroke prevention: Antiplatelets: None:  Large size stroke on 01/28 patient to wait total of 7 days to restart anticoagulants on 2/for    Statins for secondary stroke prevention and hyperlipidemia, if present:   Statins: Atorvastatin- 40 mg daily    Aggressive risk factor modification: HTN, DM, A-Fib, CAD     Rehab efforts: The patient has been evaluated by a stroke team provider and the therapy needs have been fully considered based off the presenting complaints and exam findings. The following therapy evaluations are needed: PT evaluate and treat, OT evaluate and treat, SLP evaluate and treat    Diagnostics ordered/pending: Carotid ultrasound to assess vasculature, CT scan of head without contrast to asses brain parenchyma, CTA Head to assess vasculature , HgbA1C to assess blood glucose levels, Lipid Profile to assess cholesterol levels, MRA head to assess vasculature, MRA neck/arch to assess vasculature, MRI head without contrast to assess brain parenchyma, TTE to assess cardiac function/status , TSH to assess thyroid function    MRA brain on 01/29  Areas of acute infarction right MCA distribution largest involving the right perisylvian and lateral right frontal regions measuring 4.9 x 2.9 cm and smaller lateral right parietooccipital cortical infarct measuring 2.6 x 2.1 cm with associated localized mass effect including effacement of adjacent portions right sylvian fissure and overlying cortical sulci.     Nonacute posterior left frontal subcortical white matter infarct with small focus overlying cortical involvement.  Moderate presumed microvascular ischemic change white matter.    CT head on 02/03  Evolving right  MCA vascular territory infarcts    MRI brain on 2/4  Stable bilateral acute/subacute supratentorial infarcts without significant extension or hemorrhagic conversion.     Background of prominent periventricular white matter changes of chronic microvascular ischemia.    EEG    This is an abnormal EEG during stupor state.  The overall degree of disorganization and slowing for given age is suggestive of a moderate to severe encephalopathy, likely a toxic metabolic encephalopathy.  There is no evidence of an epileptic process on this recording.  No seizures were recorded during this study.   VTE prophylaxis: None: Reason for No Pharmacological VTE Prophylaxis:  Due to large stroke per Neurology recommendation had [facility] patient to be started after 7 days (2/4    BP parameters: Infarct:  Out of the window at this moment to maintain blood pressure within normal values

## 2025-02-05 NOTE — HPI
This is an 84-year-old lady with a history of AFib on Eliquis and multiple other comorbidities here for suspected stroke.    History is obtained from the patient's chart, bedside nursing, as well as patient's daughter and granddaughter.    Patient had a stroke in his currently unable to tolerate oral intake.  She does not seem to have much purposeful movement.  She has been seen by the neurologist.  She is continues on Eliquis currently.  She was evaluated by speech but no further evaluation could be obtained because the patient can not cooperate.  GI is asked for a feeding tube, peg placement.

## 2025-02-05 NOTE — ASSESSMENT & PLAN NOTE
2/3 at [facility] sent for stat CT head  -TSH T4 ammonia levels  -a neurology consulted-appreciate recs    Repeat MRI brain  unchanged   EEG   This is an abnormal EEG during stupor state.  The overall degree of disorganization and slowing for given age is suggestive of a moderate to severe encephalopathy, likely a toxic metabolic encephalopathy.  There is no evidence of an epileptic process on this recording.  No seizures were recorded during this study.   Consult GI for PEG tube placement

## 2025-02-05 NOTE — ASSESSMENT & PLAN NOTE
Secondary to stroke    Asked to eval for PEG tube    Recs  Would consider further goals of care discussion    Need to hold eliquis for 48 hours    IF GOC and family in agreement, tentatively plan PEG, earliest Friday, would need eliquis held tonight and forward.

## 2025-02-05 NOTE — SUBJECTIVE & OBJECTIVE
Interval History: lying in bed, unresponsive, on esmolol drip- wean with goal to transition to Metoprolol  bid     Appreciates SLP rec's -remain NPO-consult GI for PEG tube placement    Repeat MRI brain- stable  EEG- abnormal   Primary team and pulmonary crit met with patient family at the bedside, updated on clinical status and imaging reports. Also discussed possible disease trajectory, family open to supportive treatment for now to include peg tube placement and possible LTAC placement. Code status also discussed and family wants to continue present treament regimen but make patient DNR in the event she decline clinically.  Questions and concerns were addressed          Review of Systems   Unable to perform ROS: Patient unresponsive     Objective:     Vital Signs (Most Recent):  Temp: 98.1 °F (36.7 °C) (02/05/25 1100)  Pulse: 97 (02/05/25 1122)  Resp: 16 (02/05/25 1100)  BP: 131/79 (02/05/25 1100)  SpO2: 98 % (02/05/25 1100) Vital Signs (24h Range):  Temp:  [97.2 °F (36.2 °C)-100.7 °F (38.2 °C)] 98.1 °F (36.7 °C)  Pulse:  [] 97  Resp:  [13-31] 16  SpO2:  [86 %-99 %] 98 %  BP: ()/() 131/79     Weight: 51.8 kg (114 lb 3.2 oz)  Body mass index is 23.07 kg/m².    Intake/Output Summary (Last 24 hours) at 2/5/2025 1222  Last data filed at 2/5/2025 1000  Gross per 24 hour   Intake 1925.82 ml   Output 400 ml   Net 1525.82 ml         Physical Exam  Constitutional:       General: She is not in acute distress.     Appearance: She is ill-appearing. She is not toxic-appearing.      Comments: Very cachectic   HENT:      Head: Normocephalic and atraumatic.   Cardiovascular:      Rate and Rhythm: Normal rate. Rhythm irregular.   Pulmonary:      Effort: Pulmonary effort is normal.      Breath sounds: Normal breath sounds.   Abdominal:      General: Abdomen is flat.      Palpations: Abdomen is soft.   Musculoskeletal:         General: No swelling or tenderness.      Cervical back: Normal range of motion.       "Right lower leg: No edema.      Left lower leg: No edema.   Skin:     General: Skin is dry.      Capillary Refill: Capillary refill takes less than 2 seconds.      Coloration: Skin is pale.   Neurological:      Mental Status: She is disoriented.             Significant Labs: A1C:   Recent Labs   Lab 10/01/24  0816 01/29/25  2300   HGBA1C 5.8* 5.7*     ABGs:   Recent Labs   Lab 02/03/25  1741 02/03/25  1832   PH 7.414 7.582*   PCO2 36.8 18.2*   HCO3 23.5* 17.1*   POCSATURATED 54.0* 98.8   PO2 30.3* 161*     Blood Culture:   Recent Labs   Lab 02/03/25  1325   LABBLOO No Growth to date  No Growth to date  No Growth to date  No Growth to date     CBC:   Recent Labs   Lab 02/03/25  1353 02/04/25  0337   WBC 8.50  8.50 9.27   HGB 12.0  12.0 12.1   HCT 37.4  37.4 36.9*     253 228     CMP:   Recent Labs   Lab 02/03/25  1353 02/04/25  0337 02/05/25  0542    140  140 137   K 4.6 5.0  4.9 4.8    108  108 109   CO2 20* 18*  18* 17*    106  107 96   BUN 16 22  22 29*   CREATININE 1.1 1.4  1.4 1.2   CALCIUM 9.3 8.8  8.9 8.5*   PROT 6.8  --   --    ALBUMIN 3.2* 2.9*  --    BILITOT 1.3*  --   --    ALKPHOS 57  --   --    AST 26  --   --    ALT 13  --   --    ANIONGAP 13 14  14 11     Cardiac Markers:   Recent Labs   Lab 02/03/25  1353   *     Lactic Acid:   Recent Labs   Lab 02/03/25  1353   LACTATE 1.7     Lipase:   Recent Labs   Lab 02/03/25  1353   LIPASE 14     Lipid Panel: No results for input(s): "CHOL", "HDL", "LDLCALC", "TRIG", "CHOLHDL" in the last 48 hours.  Magnesium:   Recent Labs   Lab 02/04/25  0337 02/05/25  0542   MG 1.9  1.9 1.9     Troponin:   Recent Labs   Lab 02/03/25  1353   TROPONINI 0.218*     TSH:   Recent Labs   Lab 02/03/25  1353   TSH 3.156     Urine Culture: No results for input(s): "LABURIN" in the last 48 hours.  Urine Studies: No results for input(s): "COLORU", "APPEARANCEUA", "PHUR", "SPECGRAV", "PROTEINUA", "GLUCUA", "KETONESU", "BILIRUBINUA", " ""OCCULTUA", "NITRITE", "UROBILINOGEN", "LEUKOCYTESUR", "RBCUA", "WBCUA", "BACTERIA", "SQUAMEPITHEL", "HYALINECASTS" in the last 48 hours.    Invalid input(s): "WRIGHTSUR"    Significant Imaging: I have reviewed all pertinent imaging results/findings within the past 24 hours.  "

## 2025-02-05 NOTE — SUBJECTIVE & OBJECTIVE
Past Medical History:   Diagnosis Date    Acid reflux     Anemia     Arthritis     Atrial fibrillation     Carpal tunnel syndrome     Cataract     Dry mouth     GERD (gastroesophageal reflux disease)     Hyperlipidemia     Hypertension     Insomnia     Other osteoporosis without current pathological fracture 3/14/2018    PONV (postoperative nausea and vomiting)     short term       Past Surgical History:   Procedure Laterality Date    APPENDECTOMY      CARDIOVERSION N/A 2/8/2019    Procedure: Cardioversion;  Surgeon: Emmanuel Matthew MD;  Location: [facility] CATH LAB;  Service: Cardiology;  Laterality: N/A;    COLONOSCOPY      COLONOSCOPY N/A 5/28/2020    Procedure: COLONOSCOPY;  Surgeon: Gopi Brooke MD;  Location: [facility] ENDO;  Service: Endoscopy;  Laterality: N/A;    COLONOSCOPY N/A 7/7/2020    Procedure: COLONOSCOPY;  Surgeon: Gopi Brooke MD;  Location: [facility];  Service: Endoscopy;  Laterality: N/A;    ESOPHAGOGASTRODUODENOSCOPY N/A 5/28/2020    Procedure: EGD (ESOPHAGOGASTRODUODENOSCOPY);  Surgeon: Gopi Brooke MD;  Location: [facility];  Service: Endoscopy;  Laterality: N/A;    FOOT SURGERY Left     HAND SURGERY      HERNIA REPAIR      x2    HYSTERECTOMY  age 32    fibroids    OOPHORECTOMY         Review of patient's allergies indicates:   Allergen Reactions    Asa [aspirin] Nausea Only     Family History       Problem Relation (Age of Onset)    Arthritis Father    Asthma Daughter    Cancer Father, Sister    Diabetes Sister    Heart disease Sister, Brother    Leukemia Mother          Tobacco Use    Smoking status: Never     Passive exposure: Never    Smokeless tobacco: Never   Substance and Sexual Activity    Alcohol use: No     Alcohol/week: 0.0 standard drinks of alcohol    Drug use: No    Sexual activity: Not Currently     Partners: Male     Birth control/protection: Surgical     Review of Systems   Unable to perform ROS: Mental status change     Objective:     Vital Signs (Most  Recent):  Temp: 98.1 °F (36.7 °C) (02/05/25 1100)  Pulse: 91 (02/05/25 1200)  Resp: 18 (02/05/25 1200)  BP: (!) 140/92 (02/05/25 1200)  SpO2: 98 % (02/05/25 1200) Vital Signs (24h Range):  Temp:  [97.3 °F (36.3 °C)-100.7 °F (38.2 °C)] 98.1 °F (36.7 °C)  Pulse:  [] 91  Resp:  [13-31] 18  SpO2:  [86 %-99 %] 98 %  BP: (104-155)/() 140/92     Weight: 51.8 kg (114 lb 3.2 oz) (02/04/25 2059)  Body mass index is 23.07 kg/m².      Intake/Output Summary (Last 24 hours) at 2/5/2025 1525  Last data filed at 2/5/2025 1333  Gross per 24 hour   Intake 1817.66 ml   Output 500 ml   Net 1317.66 ml       Lines/Drains/Airways       Drain  Duration             Female External Urinary Catheter w/ Suction 02/03/25 0600 2 days         NG/OG Tube 02/04/25 1148 16 Fr. Left nostril 1 day              Peripheral Intravenous Line  Duration                  Peripheral IV - Single Lumen 02/03/25 2341 22 G Left;Posterior Hand 1 day         Midline Catheter - Single Lumen 02/04/25 2140 Left brachial vein other (see comments) <1 day                     Physical Exam  Vitals reviewed.   Constitutional:       Appearance: She is ill-appearing. She is not diaphoretic.      Comments: nonresponsive   HENT:      Head: Normocephalic and atraumatic.      Nose:      Comments: Ng in place  Eyes:      General: No scleral icterus.     Conjunctiva/sclera: Conjunctivae normal.   Cardiovascular:      Rate and Rhythm: Normal rate. Rhythm irregular.      Heart sounds: Normal heart sounds.   Pulmonary:      Effort: Pulmonary effort is normal. No respiratory distress.      Breath sounds: Normal breath sounds. No stridor.      Comments: Gurgling breath sounds  Abdominal:      General: There is no distension.      Palpations: Abdomen is soft. There is no mass.      Tenderness: There is no abdominal tenderness. There is no guarding or rebound.   Musculoskeletal:         General: No tenderness or deformity.      Cervical back: Neck supple.   Lymphadenopathy:  "     Cervical: No cervical adenopathy.   Skin:     General: Skin is warm and dry.      Findings: No rash.   Neurological:      Mental Status: She is disoriented.   Psychiatric:      Comments: Unable to assess          Significant Labs:  CBC:   Recent Labs   Lab 02/04/25 0337   WBC 9.27   HGB 12.1   HCT 36.9*        BMP:   Recent Labs   Lab 02/05/25  0542   GLU 96      K 4.8      CO2 17*   BUN 29*   CREATININE 1.2   CALCIUM 8.5*   MG 1.9     CMP:   Recent Labs   Lab 02/04/25 0337 02/05/25  0542     107 96   CALCIUM 8.8  8.9 8.5*   ALBUMIN 2.9*  --      140 137   K 5.0  4.9 4.8   CO2 18*  18* 17*     108 109   BUN 22  22 29*   CREATININE 1.4  1.4 1.2     Coagulation: No results for input(s): "PT", "INR", "APTT" in the last 48 hours.    Significant Imaging:  Imaging results within the past 24 hours have been reviewed.  "

## 2025-02-05 NOTE — PLAN OF CARE
Problem: Diabetes Comorbidity  Goal: Blood Glucose Level Within Targeted Range  Outcome: Progressing     Problem: Wound  Goal: Absence of Infection Signs and Symptoms  Outcome: Progressing  Goal: Optimal Pain Control and Function  Outcome: Progressing  Goal: Skin Health and Integrity  Outcome: Progressing  Goal: Optimal Wound Healing  Outcome: Progressing     Problem: Fall Injury Risk  Goal: Absence of Fall and Fall-Related Injury  Outcome: Progressing     Problem: Skin Injury Risk Increased  Goal: Skin Health and Integrity  Outcome: Progressing     Problem: Infection  Goal: Absence of Infection Signs and Symptoms  Outcome: Progressing     Problem: Restraint, Nonviolent  Goal: Absence of Harm or Injury  Outcome: Progressing

## 2025-02-05 NOTE — PLAN OF CARE
Problem: Adult Inpatient Plan of Care  Goal: Plan of Care Review  Outcome: Progressing  Goal: Patient-Specific Goal (Individualized)  Outcome: Progressing  Goal: Absence of Hospital-Acquired Illness or Injury  Outcome: Progressing  Goal: Optimal Comfort and Wellbeing  Outcome: Progressing  Goal: Readiness for Transition of Care  Outcome: Progressing     Problem: Diabetes Comorbidity  Goal: Blood Glucose Level Within Targeted Range  Outcome: Progressing     Problem: Wound  Goal: Optimal Coping  Outcome: Progressing  Goal: Optimal Functional Ability  Outcome: Progressing  Goal: Absence of Infection Signs and Symptoms  Outcome: Progressing  Goal: Improved Oral Intake  Outcome: Progressing  Goal: Optimal Pain Control and Function  Outcome: Progressing  Goal: Skin Health and Integrity  Outcome: Progressing  Goal: Optimal Wound Healing  Outcome: Progressing     Problem: Fall Injury Risk  Goal: Absence of Fall and Fall-Related Injury  Outcome: Progressing     Problem: Skin Injury Risk Increased  Goal: Skin Health and Integrity  Outcome: Progressing     Problem: Infection  Goal: Absence of Infection Signs and Symptoms  Outcome: Progressing     Problem: Restraint, Nonviolent  Goal: Absence of Harm or Injury  Outcome: Progressing

## 2025-02-05 NOTE — EICU
eICU Physician Virtual/Remote Brief Evaluation Note      Message from bedside   Needs order for midline  Esmolol running and midline we will not draw back  They can't get another peripheral IV  Chart reviewed  /62, P 83-atrial flutter, RR 22, O2 sat 98   Midline order placed      VIVIAN Merritt MD  eICU Attending  [phone]    This report has been created through the use of [employer] dictation software. Typographical and content errors may occur with this process. While efforts are made to detect and correct such errors, in some cases errors will persist. For this reason, wording in this document should be considered in the proper context and not strictly verbatim

## 2025-02-05 NOTE — CONSULTS
[facility] - Intensive Care  Adult Nutrition  Consult Note    SUMMARY     Recommendations    Recommendation:  1. Change TF to Isosource 1.5. Initiate at 10ml/hr and advance as tolerated to goal rate of 40ml/hr which would provide 1440 kcal, 81g protein, & 916ml free water.     2. Monitor weight/labs.     3. RD to follow to monitor TF tolerance.    Goals:  TF to meet at least 85% EEN/EPN by RD follow up  Nutrition Goal Status: new  Communication of RD Recs: discussed on rounds (Gretta)    Assessment and Plan    Nutrition Problem  Inadequate energy intake    Related to (etiology):   stroke    Signs and Symptoms (as evidenced by):   NPO, dysphagia, AMS     Interventions(treatment strategy):  Collaboration with other providers  Modify rate of enteral nutrition    Nutrition Diagnosis Status:   New      Malnutrition Assessment  Thoracic and Lumbar Region: mild depletion   Alevism Region (Muscle Loss): moderate depletion  Clavicle Bone Region (Muscle Loss): mild depletion       Reason for Assessment  Reason For Assessment: consult (stroke)  Diagnosis: stroke/CVA  General Information Comments: Pt admitted with stroke. Transferred from [facility]. Pt on pureed diet with Boost Plus but RN reports pt needs to be NPO. NG tube placed and started on TF of Diabetisource AC at 20ml/hr. Alexey 14-skin intact. No recent weight loss noted. NFPE completed today 2/4- mild wasting of clavicles and thoracic region with moderate wasting of temples.  Nutrition Discharge Planning: d/c needs to be determined    Past Medical History:   Diagnosis Date    Acid reflux     Anemia     Arthritis     Atrial fibrillation     Carpal tunnel syndrome     Cataract     Dry mouth     GERD (gastroesophageal reflux disease)     Hyperlipidemia     Hypertension     Insomnia     Other osteoporosis without current pathological fracture 3/14/2018    PONV (postoperative nausea and vomiting)     short term        Nutrition/Diet History  Food Preferences: unable  "to assess  Spiritual, Cultural Beliefs, Mormonism Practices, Values that Affect Care: no  Factors Affecting Nutritional Intake: NPO, impaired cognitive status/motor control    Anthropometrics  Height: 4' 11" (149.9 cm)  Height (inches): 59 in  Height Method: Estimated  Weight: 51.8 kg (114 lb 3.2 oz)  Weight (lb): 114.2 lb  Weight Method: Bed Scale  Ideal Body Weight (IBW), Female: 95 lb  % Ideal Body Weight, Female (lb): 120.21 %  BMI (Calculated): 23.1  Usual Body Weight (UBW), k.5 kg (8/6)  % Usual Body Weight: 107.03  % Weight Change From Usual Weight: 6.8 %     Lab/Procedures/Meds  Pertinent Labs Reviewed: reviewed  Pertinent Labs Comments: Alb 2.9L  Pertinent Medications Reviewed: reviewed  Pertinent Medications Comments: digoxin, lisinopril, lopressor, senna    Estimated/Assessed Needs  Weight Used For Calorie Calculations: 51.8 kg (114 lb 3.2 oz)  Energy Calorie Requirements (kcal): 1554 (30 kcal/kg)  Energy Need Method: Kcal/kg  Protein Requirements: 51g (1.0g/kg)  Weight Used For Protein Calculations: 51.8 kg (114 lb 3.2 oz)  Estimated Fluid Requirement Method: RDA Method  RDA Method (mL): 1554     Nutrition Prescription Ordered  Current Diet Order: NPO  Current Nutrition Support Formula Ordered: Diabetisource AC  Current Nutrition Support Rate Ordered: 20 (ml)  Current Nutrition Support Frequency Ordered: ml/hr    Evaluation of Received Nutrient/Fluid Intake  Enteral Calories (kcal): 576  Enteral Protein (gm): 28  Enteral (Free Water) Fluid (mL): 391  % Kcal Needs: 37  % Protein Needs: 54  I/O: 1523/0  Energy Calories Required: not meeting needs  Protein Required: not meeting needs  Fluid Required: not meeting needs  Comments: LBM 2/2  % Intake of Estimated Energy Needs: 25 - 50 %  % Meal Intake: NPO    Nutrition Risk  Level of Risk/Frequency of Follow-up:  (2xweekly)     Monitor and Evaluation  Food and Nutrient Intake: energy intake  Food and Nutrient Adminstration: diet order, enteral and " parenteral nutrition administration  Physical Activity and Function: nutrition-related ADLs and IADLs  Anthropometric Measurements: weight  Biochemical Data, Medical Tests and Procedures: electrolyte and renal panel  Nutrition-Focused Physical Findings: overall appearance     Nutrition Related Social Determinants of Health: SDOH: Adequate food in home environment     Nutrition Follow-Up  RD Follow-up?: Yes

## 2025-02-05 NOTE — PROCEDURES
DATE: 2/5/25    EEG NUMBER: OK     REFERRING PHYSICIAN:  Dr. Booker      This EEG was performed to assess for subclinical seizures      ELECTROENCEPHALOGRAM REPORT     METHODOLOGY:  Electroencephalographic (EEG) recording is with electrodes placed according to the International 10-20 placement system.  Thirty two (32) channels of digital signal are simultaneously recorded from the scalp and may include EKG, EMG, and/or eye monitors.   Recording band pass was 0.1 to 512 hz.  Digital video recording of the patient is simultaneously recorded with the EEG.  The nursing staff report clinical symptoms and may press an event button when the patient has symptoms of clinical interest to the treating physicians.  EEG and video recording is stored and archived in digital format.  The entire recording is visually reviewed, and the times identified by computer analysis as being spikes or seizures are reviewed again.  Activation procedures which include photic stimulation, hyperventilation and instructing patients to perform simple task are done in selected patients.   Compresses spectral analysis (CSA) is also performed on the activity recorded from each individual channel.  This is displayed as a power display of frequencies from 0 to 30 Hz over time.   The CSA analysis is done and displayed continuously.  This is reviewed for asymmetries in power between homologous areas of the scalp and for presence of changes in power which can be seen when seizures occur.  Sections of suspected abnormalities on the CSA is then compared with the original EEG recording.                [employer]oy[employer] software was also utilized in the review of this study.  This software suite analyzes the EEG recording in multiple domains.  Coherence and rhythmicity is computed to identify EEG sections which may contain organized seizures.  Each channel undergoes analysis to detect presence of spike and sharp waves which have special and morphological  characteristic of epileptic activity.  The routine EEG recording is converted from spacial into frequency domain.  This is then displayed comparing homologous areas to identify areas of significant asymmetry.  Algorithm to identify non-cortically generated artifact is used to separate eye movement, EMG and other artifact from the EEG.     EEG FINDINGS:  The recording was obtained with a number of standard bipolar and referential montages during stupor state.  Diffuse disorganized low amplitude mixed delta and occasional theta range slowing was noted which was symmetric.  The background was intermixed with symmetric spindles.  Variability and reactivity were noted.  There were no interictal epileptiform abnormalities and no clinical or electrographic seizures were recorded.    The EKG channel revealed a sinus rhythm.     IMPRESSION:  This is an abnormal EEG during stupor state.  Diffuse disorganized low-amplitude slowing of the background was noted       CLINICAL CORRELATION:  The patient is 84-year-old female who presented with confusion and is currently not maintained on any antiseizure medications.  This is an abnormal EEG during stupor state.  The overall degree of disorganization and slowing for given age is suggestive of a moderate to severe encephalopathy, likely a toxic metabolic encephalopathy.  There is no evidence of an epileptic process on this recording.  No seizures were recorded during this study.

## 2025-02-05 NOTE — ASSESSMENT & PLAN NOTE
Patient has paroxysmal (<7 days) atrial fibrillation. Patient is currently in Atrial Flutter. [id number] Score: 5. The patients heart rate in the last 24 hours is as follows:  Pulse  Min: 79  Max: 135     Continue rate control strategy    Antiarrhythmics  esmolol 2000 mg in sodium chloride 0.9% 100 mL (20 mg/mL), Continuous, Intravenous- WEAN OFF  metoprolol 50 mg Q 6H per NGT- up titrate as tolerated  Digoxin 0.125 mg QD, check dig level in morning 02/06/2025    Anticoagulants  apixaban tablet 2.5 mg, 2 times daily, Oral    Plan  - Replete lytes with a goal of K>4, Mg >2

## 2025-02-05 NOTE — SUBJECTIVE & OBJECTIVE
Past Medical History:   Diagnosis Date    Acid reflux     Anemia     Arthritis     Atrial fibrillation     Carpal tunnel syndrome     Cataract     Dry mouth     GERD (gastroesophageal reflux disease)     Hyperlipidemia     Hypertension     Insomnia     Other osteoporosis without current pathological fracture 3/14/2018    PONV (postoperative nausea and vomiting)     short term       Past Surgical History:   Procedure Laterality Date    APPENDECTOMY      CARDIOVERSION N/A 2/8/2019    Procedure: Cardioversion;  Surgeon: Emmanuel Matthew MD;  Location: [facility] CATH LAB;  Service: Cardiology;  Laterality: N/A;    COLONOSCOPY      COLONOSCOPY N/A 5/28/2020    Procedure: COLONOSCOPY;  Surgeon: Gopi Brooke MD;  Location: [facility] ENDO;  Service: Endoscopy;  Laterality: N/A;    COLONOSCOPY N/A 7/7/2020    Procedure: COLONOSCOPY;  Surgeon: Gopi Brooke MD;  Location: [facility] ENDO;  Service: Endoscopy;  Laterality: N/A;    ESOPHAGOGASTRODUODENOSCOPY N/A 5/28/2020    Procedure: EGD (ESOPHAGOGASTRODUODENOSCOPY);  Surgeon: Gopi Brooke MD;  Location: [facility];  Service: Endoscopy;  Laterality: N/A;    FOOT SURGERY Left     HAND SURGERY      HERNIA REPAIR      x2    HYSTERECTOMY  age 32    fibroids    OOPHORECTOMY         Review of patient's allergies indicates:   Allergen Reactions    Asa [aspirin] Nausea Only       No current facility-administered medications on file prior to encounter.     Current Outpatient Medications on File Prior to Encounter   Medication Sig    acetaminophen (TYLENOL) 650 MG TbSR Take 1 tablet (650 mg total) by mouth 3 (three) times daily as needed.    albuterol (PROVENTIL/VENTOLIN HFA) 90 mcg/actuation inhaler INHALE 2 PUFFS INTO THE LUNGS EVERY 6 HOURS AS NEEDED WHEEZING RESCUE    apixaban (ELIQUIS) 2.5 mg Tab Take 1 tablet (2.5 mg total) by mouth 2 (two) times daily.    aspirin 81 MG Chew Take 1 tablet (81 mg total) by mouth once daily.    baclofen (LIORESAL) 5 mg Tab tablet Take 2  tablets (10 mg total) by mouth after lunch for 3 days, THEN 1 tablet (5 mg total) after lunch for 2 days, THEN 0.5 tablets (2.5 mg total) after lunch for 2 days.    benzonatate (TESSALON) 200 MG capsule Take 1 capsule (200 mg total) by mouth 3 (three) times daily as needed for Cough.    blood pressure test kit-large Kit 1 kit by Misc.(Non-Drug; Combo Route) route 2 (two) times daily as needed.    blood sugar diagnostic (TRUE METRIX GLUCOSE TEST STRIP) Strp Check BS once daily    blood-glucose meter (TRUE METRIX GLUCOSE METER) kit Use to check BS once daily    diltiaZEM (CARDIZEM CD) 180 MG 24 hr capsule Take 1 capsule (180 mg total) by mouth once daily.    fluticasone propionate (FLONASE) 50 mcg/actuation nasal spray 1 spray to each nostril twice daily for 7 days, then may continue 1 spray to each nostril ONCE daily as needed for sinus congestion.    furosemide (LASIX) 20 MG tablet Take 1 tablet (20 mg total) by mouth daily as needed (edema).    hydroxychloroquine (PLAQUENIL) 200 mg tablet Take 1 tablet (200 mg total) by mouth 2 (two) times daily.    lancets (LANCETS,THIN) Misc Check BS once daily    lisinopriL 10 MG tablet Take 1 tablet (10 mg total) by mouth every evening. for 120 doses    metoprolol succinate (TOPROL-XL) 50 MG 24 hr tablet Take 1 tablet (50 mg total) by mouth 2 (two) times daily.    nystatin (MYCOSTATIN) cream Apply topically 2 (two) times daily.    nystatin (MYCOSTATIN) powder Apply topically 4 (four) times daily.    polyethylene glycol (GLYCOLAX) 17 gram/dose powder Take 17 g by mouth once daily.    triamcinolone acetonide 0.1% (KENALOG) 0.1 % cream Apply topically 2 (two) times daily. for 7 days     Family History       Problem Relation (Age of Onset)    Arthritis Father    Asthma Daughter    Cancer Father, Sister    Diabetes Sister    Heart disease Sister, Brother    Leukemia Mother          Tobacco Use    Smoking status: Never     Passive exposure: Never    Smokeless tobacco: Never    Substance and Sexual Activity    Alcohol use: No     Alcohol/week: 0.0 standard drinks of alcohol    Drug use: No    Sexual activity: Not Currently     Partners: Male     Birth control/protection: Surgical     Review of Systems   Unable to perform ROS: Other     Objective:     Vital Signs (Most Recent):  Temp: (!) 100.7 °F (38.2 °C) (02/05/25 0800)  Pulse: 98 (02/05/25 1000)  Resp: 19 (02/05/25 1000)  BP: 125/73 (02/05/25 1000)  SpO2: 96 % (02/05/25 1000) Vital Signs (24h Range):  Temp:  [97.2 °F (36.2 °C)-100.7 °F (38.2 °C)] 100.7 °F (38.2 °C)  Pulse:  [] 98  Resp:  [13-31] 19  SpO2:  [86 %-99 %] 96 %  BP: ()/() 125/73     Weight: 51.8 kg (114 lb 3.2 oz)  Body mass index is 23.07 kg/m².    SpO2: 96 %         Intake/Output Summary (Last 24 hours) at 2/5/2025 1116  Last data filed at 2/5/2025 1000  Gross per 24 hour   Intake 1925.82 ml   Output 400 ml   Net 1525.82 ml       Lines/Drains/Airways       Drain  Duration             Female External Urinary Catheter w/ Suction 02/03/25 0600 2 days         NG/OG Tube 02/04/25 1148 16 Fr. Left nostril <1 day              Peripheral Intravenous Line  Duration                  Peripheral IV - Single Lumen 02/03/25 2341 22 G Left;Posterior Hand 1 day         Midline Catheter - Single Lumen 02/04/25 2140 Left brachial vein other (see comments) <1 day                     Physical Exam  Constitutional:       General: She is not in acute distress.     Appearance: She is ill-appearing. She is not diaphoretic.   Eyes:      General:         Right eye: No discharge.         Left eye: No discharge.   Cardiovascular:      Rate and Rhythm: Tachycardia present. Rhythm irregular.      Heart sounds: Murmur heard.   Neurological:      Mental Status: She is alert.          Significant Labs: BMP:   Recent Labs   Lab 02/03/25  1353 02/04/25  0337 02/05/25  0542    106  107 96    140  140 137   K 4.6 5.0  4.9 4.8    108  108 109   CO2 20* 18*  18* 17*  "  BUN 16 22  22 29*   CREATININE 1.1 1.4  1.4 1.2   CALCIUM 9.3 8.8  8.9 8.5*   MG  --  1.9  1.9 1.9   , CMP   Recent Labs   Lab 02/03/25  1353 02/04/25  0337 02/05/25  0542    140  140 137   K 4.6 5.0  4.9 4.8    108  108 109   CO2 20* 18*  18* 17*    106  107 96   BUN 16 22  22 29*   CREATININE 1.1 1.4  1.4 1.2   CALCIUM 9.3 8.8  8.9 8.5*   PROT 6.8  --   --    ALBUMIN 3.2* 2.9*  --    BILITOT 1.3*  --   --    ALKPHOS 57  --   --    AST 26  --   --    ALT 13  --   --    ANIONGAP 13 14  14 11   , CBC   Recent Labs   Lab 02/03/25  1353 02/04/25  0337   WBC 8.50  8.50 9.27   HGB 12.0  12.0 12.1   HCT 37.4  37.4 36.9*     253 228   , INR   Recent Labs   Lab 02/03/25  1353   INR 1.2   , Lipid Panel No results for input(s): "CHOL", "HDL", "LDLCALC", "TRIG", "CHOLHDL" in the last 48 hours., Troponin No results for input(s): "TROPONINIHS" in the last 48 hours., and All pertinent lab results from the last 24 hours have been reviewed.    Significant Imaging: Echocardiogram: Transthoracic echo (TTE) complete (Cupid Only):   Results for orders placed or performed during the hospital encounter of 01/29/25   Echo Saline Bubble? Yes; Ultrasound enhancing contrast? Yes   Result Value Ref Range    BSA 1.41 m2    Martin's Biplane MOD Ejection Fraction 28 %    A2C EF 26 %    A4C EF 32 %    LVOT stroke volume 35.1 cm3    LVIDd 3.4 (A) 3.5 - 6.0 cm    LV Systolic Volume 24.71 mL    LV Systolic Volume Index 17.5 mL/m2    LVIDs 2.6 2.1 - 4.0 cm    LV ESV A2C 79.54 mL    LV Diastolic Volume 47.31 mL    LV ESV A4C 56.20 mL    LV Diastolic Volume Index 33.55 mL/m2    LV EDV A2C 48.088456042793185 mL    LV EDV A4C 31.71 mL    Left Ventricular End Systolic Volume by Teichholz Method 24.71 mL    Left Ventricular End Diastolic Volume by Teichholz Method 47.31 mL    IVS 1.1 0.6 - 1.1 cm    LVOT diameter 1.9 cm    LVOT area 2.8 cm2    FS 23.5 (A) 28 - 44 %    Left Ventricle Relative Wall Thickness 0.65 " cm    PW 1.1 0.6 - 1.1 cm    LV mass 114.0 g    LV Mass Index 80.9 g/m2    MV Peak E Slick 1.15 m/s    TDI LATERAL 0.09 m/s    TDI SEPTAL 0.07 m/s    E/E' ratio 14 m/s    MV Peak A Slick 0.43 m/s    TR Max Slick 3.6 m/s    E/A ratio 2.67     E wave deceleration time 149 msec    LV SEPTAL E/E' RATIO 16.4 m/s    LV LATERAL E/E' RATIO 12.8 m/s    LVOT peak slick 0.6 m/s    Left Ventricular Outflow Tract Mean Velocity 0.40 cm/s    Left Ventricular Outflow Tract Mean Gradient 0.70 mmHg    RV- pimentel basal diam 2.2 cm    RV S' 8.81 cm/s    TAPSE 1.19 cm    RV/LV Ratio 0.65 cm    LA size 4.1 cm    Left Atrium Minor Axis 4.5 cm    Left Atrium Major Axis 5.6 cm    LA Vol (MOD) 68 mL    MYRIAM (MOD) 48 mL/m2    RA area length vol 48.49 mL    RA Area 18.7 cm2    RA Major Axis 5.17 cm    RA Width 3.67 cm    RA Vol 51.53 mL    AV mean gradient 2 mmHg    AV peak gradient 3 mmHg    Ao peak slick 0.9 m/s    Ao VTI 20.1 cm    LVOT peak VTI 12.4 cm    AV valve area 1.7 cm²    AV Velocity Ratio 0.67     AV index (prosthetic) 0.62     MARGI by Velocity Ratio 1.9 cm²    Mr max slick 6.48 m/s    MV stenosis pressure 1/2 time 43.20 ms    MV valve area p 1/2 method 5.09 cm2    TV mean gradient 34 mmHg    Triscuspid Valve Regurgitation Peak Gradient 52 mmHg    PV PEAK VELOCITY 0.79 m/s    PV peak gradient 2 mmHg    Pulmonary Valve Mean Velocity 0.61 m/s    Sinus 2.53 cm    STJ 1.94 cm    Ascending aorta 2.61 cm    IVC diameter 1.92 cm    Mean e' 0.08 m/s    ZLVIDS -0.31     ZLVIDD -2.47     LA area A4C 19.98 cm2    LA area A2C 24.67 cm2    RVDD 2.15 cm    TV resting pulmonary artery pressure 60 mmHg    RV TB RVSP 12 mmHg    Est. RA pres 8 mmHg    Narrative      Left Ventricle: The left ventricle is normal in size. Mildly increased   wall thickness. Global hypokinesis present. There is severely reduced   systolic function. Quantitated ejection fraction is 28%. Unable to assess   diastolic function due to atrial fibrillation.    Right Ventricle: Right  ventricular enlargement. Systolic function is   reduced.    Left Atrium: Left atrium is severely dilated. Agitated saline study of   the atrial septum is negative after vasalva maneuver, suggesting absence   of intracardiac shunt at the atrial level.    Right Atrium: Right atrium is dilated.    Mitral Valve: There is moderate regurgitation.  EROA 0.31 cm2    Tricuspid Valve: There is moderate regurgitation.    Pulmonary Artery: There is moderate pulmonary hypertension. The   estimated pulmonary artery systolic pressure is 60 mmHg.    IVC/SVC: Intermediate venous pressure at 8 mmHg.

## 2025-02-05 NOTE — CONSULTS
Consult received for tube feeding.    Recommendation:  1. Increase TF of Isosource 1.5 to goal rate of 40ml/hr which would provide 1440 kcal, 81g protein, & 916ml free water.      2. Monitor weight/labs.      3. RD to follow to monitor TF tolerance.

## 2025-02-05 NOTE — PROGRESS NOTES
[facility] Medicine  Progress Note    Patient Name: Eneida Majano  MRN: [id number]  Patient Class: IP- Inpatient   Admission Date: 2/3/2025  Length of Stay: 2 days  Attending Physician: Chhaya Vilchis*  Primary Care Provider: Paula Cooper NP        Subjective     Principal Problem:Cerebrovascular accident (CVA) due to embolism of right middle cerebral artery        HPI:  84-year-old female with a history of anemia, atrial fibrillation/flutter (on Eliquis), rheumatoid arthritis, diabetes, gastroesophageal reflux disease, hypertension, hyperlipidemia, and insomnia , pt was transferred to [facility] on January 28 with aphasia/dysarthria, right lateral gaze, and left-sided weakness. CT head showed evidence of a right MCA distribution stroke along with other areas of infarct. CTA of the head and neck showed occlusion of the right M2 branch. She had Vascular Neurology tele-consultation and was felt not to be a thrombolytic candidate. She was also not a thrombectomy candidate with a large core infarct on imaging. MRI brain on 1/29 also showed the areas of infarct. She was admitted with right MCA stroke, atrial flutter/fibrillation with rapid ventricular response, and encephalopathy. Anticoagulation was held due to concern for potential hemorrhagic conversion. Also troponin was elevated and she had echocardiogram that showed EF 28% with decreased right ventricular systolic function and moderate pulmonary hypertension. Mentation improved somewhat by January 31. She was placed on Plavix with plans to resume anticoagulation after 7 days. By February 1, she had increased confusion. She was more tachycardic. On the morning of February 3, tachycardia persisted and she was upgraded in status and started on esmolol infusion. She was seen by Cardiology. On the morning of February 3 she was awake but confused and not following commands. She was unable to swallow oral medicines.  Blood pressure remained stable. Pt was transfer to Hospital Medicine at [facility] in ICU status for continued rate control with esmolol.       On day of transfer to [facility] :  February 3: Sodium 138, potassium 4.1, chloride 108, CO2 19, BUN 13, creatinine 0.8, glucose 125, magnesium 1.9, white blood cells 7.04, hemoglobin 12.8, hematocrit 39.8, platelets 256  -EKG showed atrial fibrillation with ventricular rate 123. T-wave abnormality.    January 31: AST 23, ALT 20  -abdominal ultrasound had no acute findings    January 30: CT head showed redemonstration of acute right MCA infarct. No acute intracranial hemorrhage.  -INR 1.1  -echocardiogram had EF 28%. Unable to assess diastolic function due to atrial fibrillation. Decreased right ventricular systolic function. Right atrium is dilated. Moderate mitral regurgitation. Moderate tricuspid regurgitation. Moderate pulmonary hypertension with estimated PA systolic pressure 60 mmHg. Intermediate venous pressure at mmHg.    January 29: MRI brain had areas of acute infarction in the right MCA distribution largest involving the right perisylvian and lateral right frontal regions and smaller lateral right parieto-occipital cortical infarct with associated localized mass effect including effacement of adjacent portions of the right sylvian fissure and overlying cortical sulci. Non acute posterior left frontal subcortical white matter infarct. Moderate presumed microvascular ischemic change     Overview/Hospital Course:  No notes on file    Interval History: lying in bed, unresponsive, on esmolol drip- wean with goal to transition to Metoprolol  bid     Appreciates SLP rec's -remain NPO-consult GI for PEG tube placement    Repeat MRI brain- stable  EEG- abnormal   Primary team and pulmonary crit met with patient family at the bedside, updated on clinical status and imaging reports. Also discussed possible disease trajectory, family open to supportive  treatment for now to include peg tube placement and possible LTAC placement. Code status also discussed and family wants to continue present treament regimen but make patient DNR in the event she decline clinically.  Questions and concerns were addressed          Review of Systems   Unable to perform ROS: Patient unresponsive     Objective:     Vital Signs (Most Recent):  Temp: 98.1 °F (36.7 °C) (02/05/25 1100)  Pulse: 97 (02/05/25 1122)  Resp: 16 (02/05/25 1100)  BP: 131/79 (02/05/25 1100)  SpO2: 98 % (02/05/25 1100) Vital Signs (24h Range):  Temp:  [97.2 °F (36.2 °C)-100.7 °F (38.2 °C)] 98.1 °F (36.7 °C)  Pulse:  [] 97  Resp:  [13-31] 16  SpO2:  [86 %-99 %] 98 %  BP: ()/() 131/79     Weight: 51.8 kg (114 lb 3.2 oz)  Body mass index is 23.07 kg/m².    Intake/Output Summary (Last 24 hours) at 2/5/2025 1222  Last data filed at 2/5/2025 1000  Gross per 24 hour   Intake 1925.82 ml   Output 400 ml   Net 1525.82 ml         Physical Exam  Constitutional:       General: She is not in acute distress.     Appearance: She is ill-appearing. She is not toxic-appearing.      Comments: Very cachectic   HENT:      Head: Normocephalic and atraumatic.   Cardiovascular:      Rate and Rhythm: Normal rate. Rhythm irregular.   Pulmonary:      Effort: Pulmonary effort is normal.      Breath sounds: Normal breath sounds.   Abdominal:      General: Abdomen is flat.      Palpations: Abdomen is soft.   Musculoskeletal:         General: No swelling or tenderness.      Cervical back: Normal range of motion.      Right lower leg: No edema.      Left lower leg: No edema.   Skin:     General: Skin is dry.      Capillary Refill: Capillary refill takes less than 2 seconds.      Coloration: Skin is pale.   Neurological:      Mental Status: She is disoriented.             Significant Labs: A1C:   Recent Labs   Lab 10/01/24  0816 01/29/25  2300   HGBA1C 5.8* 5.7*     ABGs:   Recent Labs   Lab 02/03/25  1741 02/03/25  1832   PH 7.414  "7.582*   PCO2 36.8 18.2*   HCO3 23.5* 17.1*   POCSATURATED 54.0* 98.8   PO2 30.3* 161*     Blood Culture:   Recent Labs   Lab 02/03/25  1325   LABBLOO No Growth to date  No Growth to date  No Growth to date  No Growth to date     CBC:   Recent Labs   Lab 02/03/25  1353 02/04/25  0337   WBC 8.50  8.50 9.27   HGB 12.0  12.0 12.1   HCT 37.4  37.4 36.9*     253 228     CMP:   Recent Labs   Lab 02/03/25  1353 02/04/25  0337 02/05/25  0542    140  140 137   K 4.6 5.0  4.9 4.8    108  108 109   CO2 20* 18*  18* 17*    106  107 96   BUN 16 22  22 29*   CREATININE 1.1 1.4  1.4 1.2   CALCIUM 9.3 8.8  8.9 8.5*   PROT 6.8  --   --    ALBUMIN 3.2* 2.9*  --    BILITOT 1.3*  --   --    ALKPHOS 57  --   --    AST 26  --   --    ALT 13  --   --    ANIONGAP 13 14  14 11     Cardiac Markers:   Recent Labs   Lab 02/03/25  1353   *     Lactic Acid:   Recent Labs   Lab 02/03/25  1353   LACTATE 1.7     Lipase:   Recent Labs   Lab 02/03/25  1353   LIPASE 14     Lipid Panel: No results for input(s): "CHOL", "HDL", "LDLCALC", "TRIG", "CHOLHDL" in the last 48 hours.  Magnesium:   Recent Labs   Lab 02/04/25  0337 02/05/25  0542   MG 1.9  1.9 1.9     Troponin:   Recent Labs   Lab 02/03/25  1353   TROPONINI 0.218*     TSH:   Recent Labs   Lab 02/03/25  1353   TSH 3.156     Urine Culture: No results for input(s): "LABURIN" in the last 48 hours.  Urine Studies: No results for input(s): "COLORU", "APPEARANCEUA", "PHUR", "SPECGRAV", "PROTEINUA", "GLUCUA", "KETONESU", "BILIRUBINUA", "OCCULTUA", "NITRITE", "UROBILINOGEN", "LEUKOCYTESUR", "RBCUA", "WBCUA", "BACTERIA", "SQUAMEPITHEL", "HYALINECASTS" in the last 48 hours.    Invalid input(s): "WRIGHTSUR"    Significant Imaging: I have reviewed all pertinent imaging results/findings within the past 24 hours.    Assessment and Plan     * Cerebrovascular accident (CVA) due to embolism of right middle cerebral artery  Presented on January 28, 2025 with large " stroke, patient was not a candidate for tPA versus thrombectomy  Tele stroke was consulted.  The all anticoagulants to be started after in 7 days on February 4th  Neurology consulted at [facility] after arrival on 02/03       Antithrombotics for secondary stroke prevention: Antiplatelets: None:  Large size stroke on 01/28 patient to wait total of 7 days to restart anticoagulants on 2/for    Statins for secondary stroke prevention and hyperlipidemia, if present:   Statins: Atorvastatin- 40 mg daily    Aggressive risk factor modification: HTN, DM, A-Fib, CAD     Rehab efforts: The patient has been evaluated by a stroke team provider and the therapy needs have been fully considered based off the presenting complaints and exam findings. The following therapy evaluations are needed: PT evaluate and treat, OT evaluate and treat, SLP evaluate and treat    Diagnostics ordered/pending: Carotid ultrasound to assess vasculature, CT scan of head without contrast to asses brain parenchyma, CTA Head to assess vasculature , HgbA1C to assess blood glucose levels, Lipid Profile to assess cholesterol levels, MRA head to assess vasculature, MRA neck/arch to assess vasculature, MRI head without contrast to assess brain parenchyma, TTE to assess cardiac function/status , TSH to assess thyroid function    MRA brain on 01/29  Areas of acute infarction right MCA distribution largest involving the right perisylvian and lateral right frontal regions measuring 4.9 x 2.9 cm and smaller lateral right parietooccipital cortical infarct measuring 2.6 x 2.1 cm with associated localized mass effect including effacement of adjacent portions right sylvian fissure and overlying cortical sulci.     Nonacute posterior left frontal subcortical white matter infarct with small focus overlying cortical involvement.  Moderate presumed microvascular ischemic change white matter.    CT head on 02/03  Evolving right MCA vascular territory infarcts    MRI  brain on 2/4  Stable bilateral acute/subacute supratentorial infarcts without significant extension or hemorrhagic conversion.     Background of prominent periventricular white matter changes of chronic microvascular ischemia.    EEG    This is an abnormal EEG during stupor state.  The overall degree of disorganization and slowing for given age is suggestive of a moderate to severe encephalopathy, likely a toxic metabolic encephalopathy.  There is no evidence of an epileptic process on this recording.  No seizures were recorded during this study.   VTE prophylaxis: None: Reason for No Pharmacological VTE Prophylaxis:  Due to large stroke per Neurology recommendation had [facility] patient to be started after 7 days (2/4    BP parameters: Infarct:  Out of the window at this moment to maintain blood pressure within normal values    ACP (advance care planning)  Advance Care Planning    Date: 02/04/2025    Code Status  Code status unknown. we agreed to leave patient as full code until code status is confirmed  by family./     A total of 20 min was spent on advance care planning, goals of care discussion, emotional support, formulating and communicating prognosis and exploring burden/benefit of various approaches of treatment. This discussion occurred on a fully voluntary basis with the verbal consent of the patient and/or family.      Advance Care Planning    Date: 02/05/2025  Primary team and pulmonary crit met with patient family at the bedside, updated on clinical status and imaging reports. Also discussed possible disease trajectory, family open to supportive treatment for now to include peg tube placement and possible LTAC placement. Code status also discussed and family wants to continue present treament regimen but make patient DNR in the event she decline clinically.  Questions and concerns were addressed          Code Status  In light of the patients advanced and life limiting illness,I engaged the  the family in a voluntary conversation about the patient's preferences for care  at the very end of life. The patient wishes to have a natural, peaceful death.  Along those lines, the patient does not wish to have CPR or other invasive treatments performed when her heart and/or breathing stops. I communicated to the family that a DNR order would be placed in her medical record to reflect this preference.    A total of 15 min was spent on advance care planning, goals of care discussion, emotional support, formulating and communicating prognosis and exploring burden/benefit of various approaches of treatment. This discussion occurred on a fully voluntary basis with the verbal consent of the patient and/or family.            Acute on chronic heart failure  Cardiology was consulted currently patient is on esmolol drip.  Beta blockers scheduled  Patient is a euvolemic  Patient received loading dose of digoxin for AFib with RVR uncontrolled  Echo Saline Bubble? Yes; Ultrasound enhancing contrast? Yes    Result Date: 1/30/2025    Left Ventricle: The left ventricle is normal in size. Mildly increased   wall thickness. Global hypokinesis present. There is severely reduced   systolic function. Quantitated ejection fraction is 28%. Unable to assess   diastolic function due to atrial fibrillation.    Right Ventricle: Right ventricular enlargement. Systolic function is   reduced.    Left Atrium: Left atrium is severely dilated. Agitated saline study of   the atrial septum is negative after vasalva maneuver, suggesting absence   of intracardiac shunt at the atrial level.    Right Atrium: Right atrium is dilated.    Mitral Valve: There is moderate regurgitation.  EROA 0.31 cm2    Tricuspid Valve: There is moderate regurgitation.    Pulmonary Artery: There is moderate pulmonary hypertension. The   estimated pulmonary artery systolic pressure is 60 mmHg.    IVC/SVC: Intermediate venous pressure at 8 mmHg.         Acute  encephalopathy  2/3 at [facility] sent for stat CT head  -TSH T4 ammonia levels  -a neurology consulted-appreciate recs    Repeat MRI brain  unchanged   EEG   This is an abnormal EEG during stupor state.  The overall degree of disorganization and slowing for given age is suggestive of a moderate to severe encephalopathy, likely a toxic metabolic encephalopathy.  There is no evidence of an epileptic process on this recording.  No seizures were recorded during this study.   Consult GI for PEG tube placement    Stroke due to occlusion of right middle cerebral artery  Presented on January 28, 2025 with large stroke, patient was not a candidate for tPA versus thrombectomy  Tele stroke was consulted.  The all anticoagulants to be started after in 7 days on February 4th  Neurology consulted at [facility] after arrival on 02/03       Antithrombotics for secondary stroke prevention: Antiplatelets: None:  Large size stroke on 01/28 patient to wait total of 7 days to restart anticoagulants on 2/for    Statins for secondary stroke prevention and hyperlipidemia, if present:   Statins: Atorvastatin- 40 mg daily    Aggressive risk factor modification: HTN, DM, A-Fib, CAD     Rehab efforts: The patient has been evaluated by a stroke team provider and the therapy needs have been fully considered based off the presenting complaints and exam findings. The following therapy evaluations are needed: PT evaluate and treat, OT evaluate and treat, SLP evaluate and treat    Diagnostics ordered/pending: Carotid ultrasound to assess vasculature, CT scan of head without contrast to asses brain parenchyma, CTA Head to assess vasculature , HgbA1C to assess blood glucose levels, Lipid Profile to assess cholesterol levels, MRA head to assess vasculature, MRA neck/arch to assess vasculature, MRI head without contrast to assess brain parenchyma, TTE to assess cardiac function/status , TSH to assess thyroid function    VTE prophylaxis: None:  Reason for No Pharmacological VTE Prophylaxis:  Due to large stroke per Neurology recommendation had [facility] patient to be started after 7 days (2/4    BP parameters: Infarct:  Out of the window at this moment to maintain blood pressure within normal values        Atrial fibrillation with RVR  Patient has paroxysmal (<7 days) atrial fibrillation. Patient is currently in atrial fibrillation. [id number] Score: 5. The patients heart rate in the last 24 hours is as follows:  Pulse  Min: 79  Max: 135     Antiarrhythmics  esmolol 2000 mg in sodium chloride 0.9% 100 mL (20 mg/mL), Continuous, Intravenous  metoprolol injection 5 mg, Every 6 hours PRN, Intravenous  metoprolol tartrate (LOPRESSOR) tablet 50 mg, Every 6 hours, Oral    Anticoagulants  apixaban tablet 2.5 mg, 2 times daily, Oral    Plan  - Replete lytes with a goal of K>4, Mg >2  - Patient is not anticoagulated due to recent history of massive stroke, after clearance from Neurology can consider starting patient on anticoagulated on 2/for  - Patient's afib is currently uncontrolled. Will adjust treatment as follows:  Cardiology consulted currently on esmolol drip, digoxin level than load with digoxin    Wean esmolol drip to metoprolol      Essential hypertension  Patient's blood pressure range in the last 24 hours was: BP  Min: 114/94  Max: 189/116.The patient's inpatient anti-hypertensive regimen is listed below:  Current Antihypertensives  esmolol 2000 mg in sodium chloride 0.9% 100 mL (20 mg/mL), Continuous, Intravenous  labetaloL injection 10 mg, Every 6 hours PRN, Intravenous  metoprolol tartrate (LOPRESSOR) tablet 50 mg, Every 6 hours, Oral    Plan  - BP is controlled, no changes needed to their regimen  - controlled current medications are to control AFib RVR    Gastroesophageal reflux disease  On famotidine       T2DM (type 2 diabetes mellitus)  Patient's FSGs are controlled on current medication regimen.  Last A1c reviewed-   Lab Results    Component Value Date    HGBA1C 5.7 (H) 01/29/2025     Most recent fingerstick glucose reviewed-   Recent Labs   Lab 02/03/25  0300 02/03/25  0537 02/03/25  0814 02/03/25  1244   POCTGLUCOSE 137* 121* 126* 102     Current correctional scale  Low  Maintain anti-hyperglycemic dose as follows-   Antihyperglycemics (From admission, onward)      Start     Stop Route Frequency Ordered    02/03/25 1413  insulin aspart U-100 pen 0-5 Units         -- SubQ Every 6 hours PRN 02/03/25 1313          Hold Oral hypoglycemics while patient is in the hospital.      VTE Risk Mitigation (From admission, onward)           Ordered     apixaban tablet 2.5 mg  2 times daily         02/04/25 0858     IP VTE HIGH RISK PATIENT  Once         02/03/25 1250     Place sequential compression device  Until discontinued         02/03/25 1250                    Discharge Planning   DELORES: 2/7/2025     Code Status: DNR   Medical Readiness for Discharge Date:   Discharge Plan A: Skilled Nursing Facility            Critical care time spent on the evaluation and treatment of severe organ dysfunction, review of pertinent labs and imaging studies, discussions with consulting providers and discussions with patient/family: 35 minutes.    Please place Justification for DME        Chhaya Vilchis MD  Department of Hospital Medicine   [place] - Intensive Care

## 2025-02-05 NOTE — ASSESSMENT & PLAN NOTE
TTE    Left Ventricle: The left ventricle is normal in size. Mildly increased   wall thickness. Global hypokinesis present. There is severely reduced   systolic function. Quantitated ejection fraction is 28%. Unable to assess   diastolic function due to atrial fibrillation.    Right Ventricle: Right ventricular enlargement. Systolic function is   reduced.    Left Atrium: Left atrium is severely dilated. Agitated saline study of   the atrial septum is negative after vasalva maneuver, suggesting absence   of intracardiac shunt at the atrial level.    Right Atrium: Right atrium is dilated.    Mitral Valve: There is moderate regurgitation.  EROA 0.31 cm2    Tricuspid Valve: There is moderate regurgitation.    Pulmonary Artery: There is moderate pulmonary hypertension. The   estimated pulmonary artery systolic pressure is 60 mmHg.    IVC/SVC: Intermediate venous pressure at 8 mmHg.    Previous TTEs with normal LVEF  Will proceed with ischemic evaluation, likely as OP given acute CVA  No CP reported PTA or since admission     Not overloaded on exam

## 2025-02-05 NOTE — PT/OT/SLP PROGRESS
Occupational Therapy      Patient Name:  Eneida Majano   MRN:  [id number]    Patient not seen today secondary to Nurse/ ALLAN hold. Per nursing pt has not been able to follow commands appropriately today to participate with therapy. Will follow-up as able     13:29 PM   Will follow-up as able.    2/5/2025

## 2025-02-05 NOTE — PROGRESS NOTES
[place] - Intensive Care  Cardiology  Progress Note    Patient Name: Eneida Majano  MRN: [id number]  Admission Date: 2/3/2025  Hospital Length of Stay: 2 days  Code Status: Full Code   Attending Physician: Chhaya Vilchis*   Primary Care Physician: Paula Cooper NP  Expected Discharge Date: 2/7/2025  Principal Problem:Cerebrovascular accident (CVA) due to embolism of right middle cerebral artery    Subjective:     Hospital Course:   02/03/2025 Per HPI   02/04/2025 HR 90s-120s on Esmolol and Digoxin IV. Patient unable to tolerate p.o. or follow commands.   02/05/2025 Hemodynamically stable, HR improved. NGT in place. Patient on BB, digoxin and weaning Esmolol gtt. T max 100.7 this AM. Family at bedside updated on POC.    Past Medical History:   Diagnosis Date    Acid reflux     Anemia     Arthritis     Atrial fibrillation     Carpal tunnel syndrome     Cataract     Dry mouth     GERD (gastroesophageal reflux disease)     Hyperlipidemia     Hypertension     Insomnia     Other osteoporosis without current pathological fracture 3/14/2018    PONV (postoperative nausea and vomiting)     short term       Past Surgical History:   Procedure Laterality Date    APPENDECTOMY      CARDIOVERSION N/A 2/8/2019    Procedure: Cardioversion;  Surgeon: Emmanuel Matthew MD;  Location: [facility] CATH LAB;  Service: Cardiology;  Laterality: N/A;    COLONOSCOPY      COLONOSCOPY N/A 5/28/2020    Procedure: COLONOSCOPY;  Surgeon: Gopi Brooke MD;  Location: [facility];  Service: Endoscopy;  Laterality: N/A;    COLONOSCOPY N/A 7/7/2020    Procedure: COLONOSCOPY;  Surgeon: Gopi Brooke MD;  Location: [facility];  Service: Endoscopy;  Laterality: N/A;    ESOPHAGOGASTRODUODENOSCOPY N/A 5/28/2020    Procedure: EGD (ESOPHAGOGASTRODUODENOSCOPY);  Surgeon: Gopi Brooke MD;  Location: [facility];  Service: Endoscopy;  Laterality: N/A;    FOOT SURGERY Left     HAND SURGERY      HERNIA REPAIR      x2    HYSTERECTOMY  age  32    fibroids    OOPHORECTOMY         Review of patient's allergies indicates:   Allergen Reactions    Asa [aspirin] Nausea Only       No current facility-administered medications on file prior to encounter.     Current Outpatient Medications on File Prior to Encounter   Medication Sig    acetaminophen (TYLENOL) 650 MG TbSR Take 1 tablet (650 mg total) by mouth 3 (three) times daily as needed.    albuterol (PROVENTIL/VENTOLIN HFA) 90 mcg/actuation inhaler INHALE 2 PUFFS INTO THE LUNGS EVERY 6 HOURS AS NEEDED WHEEZING RESCUE    apixaban (ELIQUIS) 2.5 mg Tab Take 1 tablet (2.5 mg total) by mouth 2 (two) times daily.    aspirin 81 MG Chew Take 1 tablet (81 mg total) by mouth once daily.    baclofen (LIORESAL) 5 mg Tab tablet Take 2 tablets (10 mg total) by mouth after lunch for 3 days, THEN 1 tablet (5 mg total) after lunch for 2 days, THEN 0.5 tablets (2.5 mg total) after lunch for 2 days.    benzonatate (TESSALON) 200 MG capsule Take 1 capsule (200 mg total) by mouth 3 (three) times daily as needed for Cough.    blood pressure test kit-large Kit 1 kit by Misc.(Non-Drug; Combo Route) route 2 (two) times daily as needed.    blood sugar diagnostic (TRUE METRIX GLUCOSE TEST STRIP) Strp Check BS once daily    blood-glucose meter (TRUE METRIX GLUCOSE METER) kit Use to check BS once daily    diltiaZEM (CARDIZEM CD) 180 MG 24 hr capsule Take 1 capsule (180 mg total) by mouth once daily.    fluticasone propionate (FLONASE) 50 mcg/actuation nasal spray 1 spray to each nostril twice daily for 7 days, then may continue 1 spray to each nostril ONCE daily as needed for sinus congestion.    furosemide (LASIX) 20 MG tablet Take 1 tablet (20 mg total) by mouth daily as needed (edema).    hydroxychloroquine (PLAQUENIL) 200 mg tablet Take 1 tablet (200 mg total) by mouth 2 (two) times daily.    lancets (LANCETS,THIN) Misc Check BS once daily    lisinopriL 10 MG tablet Take 1 tablet (10 mg total) by mouth every evening. for 120 doses     metoprolol succinate (TOPROL-XL) 50 MG 24 hr tablet Take 1 tablet (50 mg total) by mouth 2 (two) times daily.    nystatin (MYCOSTATIN) cream Apply topically 2 (two) times daily.    nystatin (MYCOSTATIN) powder Apply topically 4 (four) times daily.    polyethylene glycol (GLYCOLAX) 17 gram/dose powder Take 17 g by mouth once daily.    triamcinolone acetonide 0.1% (KENALOG) 0.1 % cream Apply topically 2 (two) times daily. for 7 days     Family History       Problem Relation (Age of Onset)    Arthritis Father    Asthma Daughter    Cancer Father, Sister    Diabetes Sister    Heart disease Sister, Brother    Leukemia Mother          Tobacco Use    Smoking status: Never     Passive exposure: Never    Smokeless tobacco: Never   Substance and Sexual Activity    Alcohol use: No     Alcohol/week: 0.0 standard drinks of alcohol    Drug use: No    Sexual activity: Not Currently     Partners: Male     Birth control/protection: Surgical     Review of Systems   Unable to perform ROS: Other     Objective:     Vital Signs (Most Recent):  Temp: (!) 100.7 °F (38.2 °C) (02/05/25 0800)  Pulse: 98 (02/05/25 1000)  Resp: 19 (02/05/25 1000)  BP: 125/73 (02/05/25 1000)  SpO2: 96 % (02/05/25 1000) Vital Signs (24h Range):  Temp:  [97.2 °F (36.2 °C)-100.7 °F (38.2 °C)] 100.7 °F (38.2 °C)  Pulse:  [] 98  Resp:  [13-31] 19  SpO2:  [86 %-99 %] 96 %  BP: ()/() 125/73     Weight: 51.8 kg (114 lb 3.2 oz)  Body mass index is 23.07 kg/m².    SpO2: 96 %         Intake/Output Summary (Last 24 hours) at 2/5/2025 1116  Last data filed at 2/5/2025 1000  Gross per 24 hour   Intake 1925.82 ml   Output 400 ml   Net 1525.82 ml       Lines/Drains/Airways       Drain  Duration             Female External Urinary Catheter w/ Suction 02/03/25 0600 2 days         NG/OG Tube 02/04/25 1148 16 Fr. Left nostril <1 day              Peripheral Intravenous Line  Duration                  Peripheral IV - Single Lumen 02/03/25 2341 22 G Left;Posterior  "Hand 1 day         Midline Catheter - Single Lumen 02/04/25 2140 Left brachial vein other (see comments) <1 day                     Physical Exam  Constitutional:       General: She is not in acute distress.     Appearance: She is ill-appearing. She is not diaphoretic.   Eyes:      General:         Right eye: No discharge.         Left eye: No discharge.   Cardiovascular:      Rate and Rhythm: Tachycardia present. Rhythm irregular.      Heart sounds: Murmur heard.   Neurological:      Mental Status: She is alert.          Significant Labs: BMP:   Recent Labs   Lab 02/03/25  1353 02/04/25  0337 02/05/25  0542    106  107 96    140  140 137   K 4.6 5.0  4.9 4.8    108  108 109   CO2 20* 18*  18* 17*   BUN 16 22  22 29*   CREATININE 1.1 1.4  1.4 1.2   CALCIUM 9.3 8.8  8.9 8.5*   MG  --  1.9  1.9 1.9   , CMP   Recent Labs   Lab 02/03/25  1353 02/04/25  0337 02/05/25  0542    140  140 137   K 4.6 5.0  4.9 4.8    108  108 109   CO2 20* 18*  18* 17*    106  107 96   BUN 16 22  22 29*   CREATININE 1.1 1.4  1.4 1.2   CALCIUM 9.3 8.8  8.9 8.5*   PROT 6.8  --   --    ALBUMIN 3.2* 2.9*  --    BILITOT 1.3*  --   --    ALKPHOS 57  --   --    AST 26  --   --    ALT 13  --   --    ANIONGAP 13 14  14 11   , CBC   Recent Labs   Lab 02/03/25  1353 02/04/25  0337   WBC 8.50  8.50 9.27   HGB 12.0  12.0 12.1   HCT 37.4  37.4 36.9*     253 228   , INR   Recent Labs   Lab 02/03/25  1353   INR 1.2   , Lipid Panel No results for input(s): "CHOL", "HDL", "LDLCALC", "TRIG", "CHOLHDL" in the last 48 hours., Troponin No results for input(s): "TROPONINIHS" in the last 48 hours., and All pertinent lab results from the last 24 hours have been reviewed.    Significant Imaging: Echocardiogram: Transthoracic echo (TTE) complete (Cupid Only):   Results for orders placed or performed during the hospital encounter of 01/29/25   Echo Saline Bubble? Yes; Ultrasound enhancing contrast? Yes "   Result Value Ref Range    BSA 1.41 m2    Martin's Biplane MOD Ejection Fraction 28 %    A2C EF 26 %    A4C EF 32 %    LVOT stroke volume 35.1 cm3    LVIDd 3.4 (A) 3.5 - 6.0 cm    LV Systolic Volume 24.71 mL    LV Systolic Volume Index 17.5 mL/m2    LVIDs 2.6 2.1 - 4.0 cm    LV ESV A2C 79.54 mL    LV Diastolic Volume 47.31 mL    LV ESV A4C 56.20 mL    LV Diastolic Volume Index 33.55 mL/m2    LV EDV A2C 48.527093433440311 mL    LV EDV A4C 31.71 mL    Left Ventricular End Systolic Volume by Teichholz Method 24.71 mL    Left Ventricular End Diastolic Volume by Teichholz Method 47.31 mL    IVS 1.1 0.6 - 1.1 cm    LVOT diameter 1.9 cm    LVOT area 2.8 cm2    FS 23.5 (A) 28 - 44 %    Left Ventricle Relative Wall Thickness 0.65 cm    PW 1.1 0.6 - 1.1 cm    LV mass 114.0 g    LV Mass Index 80.9 g/m2    MV Peak E Slick 1.15 m/s    TDI LATERAL 0.09 m/s    TDI SEPTAL 0.07 m/s    E/E' ratio 14 m/s    MV Peak A Slick 0.43 m/s    TR Max Slick 3.6 m/s    E/A ratio 2.67     E wave deceleration time 149 msec    LV SEPTAL E/E' RATIO 16.4 m/s    LV LATERAL E/E' RATIO 12.8 m/s    LVOT peak slick 0.6 m/s    Left Ventricular Outflow Tract Mean Velocity 0.40 cm/s    Left Ventricular Outflow Tract Mean Gradient 0.70 mmHg    RV- pimentel basal diam 2.2 cm    RV S' 8.81 cm/s    TAPSE 1.19 cm    RV/LV Ratio 0.65 cm    LA size 4.1 cm    Left Atrium Minor Axis 4.5 cm    Left Atrium Major Axis 5.6 cm    LA Vol (MOD) 68 mL    MYRIAM (MOD) 48 mL/m2    RA area length vol 48.49 mL    RA Area 18.7 cm2    RA Major Axis 5.17 cm    RA Width 3.67 cm    RA Vol 51.53 mL    AV mean gradient 2 mmHg    AV peak gradient 3 mmHg    Ao peak slick 0.9 m/s    Ao VTI 20.1 cm    LVOT peak VTI 12.4 cm    AV valve area 1.7 cm²    AV Velocity Ratio 0.67     AV index (prosthetic) 0.62     MARGI by Velocity Ratio 1.9 cm²    Mr max slick 6.48 m/s    MV stenosis pressure 1/2 time 43.20 ms    MV valve area p 1/2 method 5.09 cm2    TV mean gradient 34 mmHg    Triscuspid Valve Regurgitation  Peak Gradient 52 mmHg    PV PEAK VELOCITY 0.79 m/s    PV peak gradient 2 mmHg    Pulmonary Valve Mean Velocity 0.61 m/s    Sinus 2.53 cm    STJ 1.94 cm    Ascending aorta 2.61 cm    IVC diameter 1.92 cm    Mean e' 0.08 m/s    ZLVIDS -0.31     ZLVIDD -2.47     LA area A4C 19.98 cm2    LA area A2C 24.67 cm2    RVDD 2.15 cm    TV resting pulmonary artery pressure 60 mmHg    RV TB RVSP 12 mmHg    Est. RA pres 8 mmHg    Narrative      Left Ventricle: The left ventricle is normal in size. Mildly increased   wall thickness. Global hypokinesis present. There is severely reduced   systolic function. Quantitated ejection fraction is 28%. Unable to assess   diastolic function due to atrial fibrillation.    Right Ventricle: Right ventricular enlargement. Systolic function is   reduced.    Left Atrium: Left atrium is severely dilated. Agitated saline study of   the atrial septum is negative after vasalva maneuver, suggesting absence   of intracardiac shunt at the atrial level.    Right Atrium: Right atrium is dilated.    Mitral Valve: There is moderate regurgitation.  EROA 0.31 cm2    Tricuspid Valve: There is moderate regurgitation.    Pulmonary Artery: There is moderate pulmonary hypertension. The   estimated pulmonary artery systolic pressure is 60 mmHg.    IVC/SVC: Intermediate venous pressure at 8 mmHg.       Assessment and Plan:     Brief HPI: Patient seen this morning on rounds with family at bedside. POC discussed with family, HR improved.     Acute on chronic heart failure  TTE    Left Ventricle: The left ventricle is normal in size. Mildly increased   wall thickness. Global hypokinesis present. There is severely reduced   systolic function. Quantitated ejection fraction is 28%. Unable to assess   diastolic function due to atrial fibrillation.    Right Ventricle: Right ventricular enlargement. Systolic function is   reduced.    Left Atrium: Left atrium is severely dilated. Agitated saline study of   the atrial septum is  negative after vasalva maneuver, suggesting absence   of intracardiac shunt at the atrial level.    Right Atrium: Right atrium is dilated.    Mitral Valve: There is moderate regurgitation.  EROA 0.31 cm2    Tricuspid Valve: There is moderate regurgitation.    Pulmonary Artery: There is moderate pulmonary hypertension. The   estimated pulmonary artery systolic pressure is 60 mmHg.    IVC/SVC: Intermediate venous pressure at 8 mmHg.    Previous TTEs with normal LVEF  Will proceed with ischemic evaluation, likely as OP given acute CVA  No CP reported PTA or since admission     Not overloaded on exam      Stroke due to occlusion of right middle cerebral artery    Neurology following, on DAPT and statin        Atrial fibrillation with RVR  Patient has paroxysmal (<7 days) atrial fibrillation. Patient is currently in Atrial Flutter. [id number] Score: 5. The patients heart rate in the last 24 hours is as follows:  Pulse  Min: 79  Max: 135     Continue rate control strategy    Antiarrhythmics  esmolol 2000 mg in sodium chloride 0.9% 100 mL (20 mg/mL), Continuous, Intravenous- WEAN OFF  metoprolol 50 mg Q 6H per NGT- up titrate as tolerated  Digoxin 0.125 mg QD, check dig level in morning 02/06/2025    Anticoagulants  apixaban tablet 2.5 mg, 2 times daily, Oral    Plan  - Replete lytes with a goal of K>4, Mg >2      Essential hypertension  Continue Lopressor 50 mg QID, Lisinopril  Up titrate BB as tolerated         VTE Risk Mitigation (From admission, onward)           Ordered     apixaban tablet 2.5 mg  2 times daily         02/04/25 0858     IP VTE HIGH RISK PATIENT  Once         02/03/25 1250     Place sequential compression device  Until discontinued         02/03/25 1250                    Tai Lemons NP  Cardiology  [place] - Intensive Care

## 2025-02-05 NOTE — CONSULTS
[facility]  Consult Note    Date of Service: 2/5/2025  Patient seen at the request of: Marco Fiore MD    Reason for Consultation  stroke    Assessment:  Eneida Majano is a 84 y.o. female who presents with stroke in the setting of atrial fibrillation.    Patient's right M2 occulusion stroke is suggestive of an embolic etiology, likely cardioembolic given the patient's history.  TTE (bubble) - EF 28%, irregularly irregular rhythm.  Patient was prescribed Eliquis for atrial fibrillation, however, there is some concern for non-compliance.  Ok to restart DOAC 2-14 days after stroke, now restarted.  Onset of symptoms was 1/28/2025.      Exam was significant for absence of withdrawal to noxious stimulation of the distal right extremity.  Repeat MRI brain is stable.  Sensory deficit on the right may be secondary to nonacute posterior left frontal subcortical white matter infarct with small focus overlying cortical involvement.      She is dysarthric and not following commands, with persistent somnolence.  No leukocytosis.  Swelling appears relatively minimal on CT head from 1/30.  Repeat CTH stable. Consider hypoactive delirium.  Consider effect of infarct burden.    EEG - This is an abnormal EEG during stupor state.  Diffuse disorganized low-amplitude slowing of the background was noted   No evidence of non-convulsive status epilepticus.    Plan:    - delirium precautions (minimize disruptions during sleeping hours, reorient frequently)  - repeat brain imaging if patient's mental status worsens/new focal deficit      A dictation device was used to produce this document. Use of such devices sometimes results in grammatical errors or replacement of words that sound similarly.     Signed:    Bakari Martin MD  Neurology/Epilepsy  02/05/2025 2:51 PM      HPI:  Eneida Majano is a 84 y.o. female with   Past Medical History:   Diagnosis Date    Acid reflux     Anemia     Arthritis     Atrial fibrillation      Carpal tunnel syndrome     Cataract     Dry mouth     GERD (gastroesophageal reflux disease)     Hyperlipidemia     Hypertension     Insomnia     Other osteoporosis without current pathological fracture 3/14/2018    PONV (postoperative nausea and vomiting)     short term     Patient is unable to provide any additional history.  She does not follow commands and was taken for repeat CT head.      This is the extent of the patient's complaints at this time.    From discharge/transfer summary from transferring hospital 2/3/2025):  84-year-old female with a past medical history of anemia, atrial fibrillation/flutter on diltiazem and Eliquis at home, GERD, dyslipidemia, hypertension, and insomnia.     She now comes as a transfer from [facility] ED due to stroke like symptoms that started one day prior to presentation at 9 PM on 1/28/25.  Her symptoms were dysphagia, left-sided weakness, and right lateral gaze. Upon presentation to the ED, she was still symptomatic.  The MRI machine at [facility] is broken, so she is being transferred to [facility] for MRI capability.  She had a completed tele-stroke consult done in the ED (please refer to Dr. Bhatt's note).     The patient's family are at the bedside and provide all of the history, as Ms. Majano is very lethargic and cannot talk to me or cooperate with exam.  Even with the nurse shaking her, she just stares ahead without interacting or speaking.  The family tells me that yesterday around 3pm, she would not answer her grandson, who found her door locked as well.  They broke the door down and found her lying on the ground incontinent of urine.  She was able to ask for a cup of water, but not get up.  He picked her up and carried her to the chair and called 911.  She also would not speak at that time as well.    Hospital Course:   Patient admitted to telemetry floor and started on neurovascular protective medications and evaluated by Neurology.  Holding on anticoagulation at  this time due to concerns for possible risk of hemorrhagic conversion and bleed.  Repeat CTA of the head recommended by Neurology to evaluate for any bleed.  Patient lethargic and poorly responsive when seen by medical team but by afternoon, PT/OT was working with patient and recommendations for high intensity therapy appreciated.  Case management consulted for assistance in placement.     1/31:  Patient's mental status greatly improved but still having significant lower extremity weakness.  Working with PT/OT and recommendations for tight intensity therapy appreciated.  CTA of the head shows no signs of acute bleed or hemorrhagic conversion.  Will plan on resuming anticoagulation on day 7 as per neurology recommendations.  Will start patient on Plavix and hold ASA for now.  Continue atorvastatin and transition from IV to p.o. metoprolol.  Awaiting SNF/rehab placement.     2/3:  Patient went into AFib with RVR overnight.  Echocardiogram shows reduced ejection fraction of 26%.  Cardiology recommended initiation of esmolol GTT and patient transferred to ICU for further evaluation and management of AFib with RVR.  Patient remains tachycardic despite maximum as well dose, blood pressure is within normal limits at this time.  Patient's mental status unchanged and remains severely confused, responds poorly to questions and commands, and still has significant focal neurological deficits.  After further discussion with Cardiology, recommendation to transfer to higher level care were Cardiology will be available to assist in management.  Patient breathing comfortably on 2 L O2 via nasal cannula.  Patient is stable for transfer via EMS to [facility] for higher level of care and access to Interventional Cardiology.       TSH   Date Value Ref Range Status   02/03/2025 3.156 0.400 - 4.000 uIU/mL Final   01/28/2025 1.451 0.400 - 4.000 uIU/mL Final     Hemoglobin A1C   Date Value Ref Range Status   01/29/2025 5.7 (H) 4.0 - 5.6 %  Final     Comment:     ADA Screening Guidelines:  5.7-6.4%  Consistent with prediabetes  >or=6.5%  Consistent with diabetes    High levels of fetal hemoglobin interfere with the HbA1C  assay. Heterozygous hemoglobin variants (HbS, HgC, etc)do  not significantly interfere with this assay.   However, presence of multiple variants may affect accuracy.     10/01/2024 5.8 (H) 4.0 - 5.6 % Final     Comment:     ADA Screening Guidelines:  5.7-6.4%  Consistent with prediabetes  >or=6.5%  Consistent with diabetes    High levels of fetal hemoglobin interfere with the HbA1C  assay. Heterozygous hemoglobin variants (HbS, HgC, etc)do  not significantly interfere with this assay.   However, presence of multiple variants may affect accuracy.           Review of Systems:  ROS negative unless noted in HPI    Past Surgical History:  Past Surgical History:   Procedure Laterality Date    APPENDECTOMY      CARDIOVERSION N/A 2/8/2019    Procedure: Cardioversion;  Surgeon: Emmanuel Matthew MD;  Location: [facility] CATH LAB;  Service: Cardiology;  Laterality: N/A;    COLONOSCOPY      COLONOSCOPY N/A 5/28/2020    Procedure: COLONOSCOPY;  Surgeon: Gopi Brooke MD;  Location: [facility];  Service: Endoscopy;  Laterality: N/A;    COLONOSCOPY N/A 7/7/2020    Procedure: COLONOSCOPY;  Surgeon: Gopi Brooke MD;  Location: [facility];  Service: Endoscopy;  Laterality: N/A;    ESOPHAGOGASTRODUODENOSCOPY N/A 5/28/2020    Procedure: EGD (ESOPHAGOGASTRODUODENOSCOPY);  Surgeon: Gopi Brooke MD;  Location: [facility];  Service: Endoscopy;  Laterality: N/A;    FOOT SURGERY Left     HAND SURGERY      HERNIA REPAIR      x2    HYSTERECTOMY  age 32    fibroids    OOPHORECTOMY         Family History:  Family History   Problem Relation Name Age of Onset    Arthritis Father      Cancer Father          liver    Leukemia Mother      Diabetes Sister      Cancer Sister          throat    Heart disease Sister      Heart disease Brother      Asthma  Daughter      Colon cancer Neg Hx         Social History:  Social History     Tobacco Use    Smoking status: Never     Passive exposure: Never    Smokeless tobacco: Never   Substance Use Topics    Alcohol use: No     Alcohol/week: 0.0 standard drinks of alcohol    Drug use: No       Allergies:  Asa [aspirin]    Outpatient Medications:  Prior to Admission medications    Medication Sig Start Date End Date Taking? Authorizing Provider   acetaminophen (TYLENOL) 650 MG TbSR Take 1 tablet (650 mg total) by mouth 3 (three) times daily as needed. 3/14/19   Sylwia Barrera NP   albuterol (PROVENTIL/VENTOLIN HFA) 90 mcg/actuation inhaler INHALE 2 PUFFS INTO THE LUNGS EVERY 6 HOURS AS NEEDED WHEEZING RESCUE 5/9/23   Paula Cooper NP   apixaban (ELIQUIS) 2.5 mg Tab Take 1 tablet (2.5 mg total) by mouth 2 (two) times daily. 8/6/24   Paula Cooper NP   aspirin 81 MG Chew Take 1 tablet (81 mg total) by mouth once daily. 3/14/19 7/13/23  Sylwia Barrera NP   baclofen (LIORESAL) 5 mg Tab tablet Take 2 tablets (10 mg total) by mouth after lunch for 3 days, THEN 1 tablet (5 mg total) after lunch for 2 days, THEN 0.5 tablets (2.5 mg total) after lunch for 2 days. 10/29/24 11/5/24  Anthony Liao MD   benzonatate (TESSALON) 200 MG capsule Take 1 capsule (200 mg total) by mouth 3 (three) times daily as needed for Cough. 2/19/23   Andre Rubio NP   blood pressure test kit-large Kit 1 kit by Misc.(Non-Drug; Combo Route) route 2 (two) times daily as needed. 3/14/19   Sylwia Barrera NP   blood sugar diagnostic (TRUE METRIX GLUCOSE TEST STRIP) Strp Check BS once daily 11/15/24   Paula Cooper NP   blood-glucose meter (TRUE METRIX GLUCOSE METER) kit Use to check BS once daily 11/15/24 11/15/25  Paula Cooper NP   diltiaZEM (CARDIZEM CD) 180 MG 24 hr capsule Take 1 capsule (180 mg total) by mouth once daily. 1/28/25 1/28/26  Shadi Irwin MD   fluticasone propionate (FLONASE) 50 mcg/actuation nasal spray 1  "spray to each nostril twice daily for 7 days, then may continue 1 spray to each nostril ONCE daily as needed for sinus congestion. 2/19/23   Andre Rubio NP   furosemide (LASIX) 20 MG tablet Take 1 tablet (20 mg total) by mouth daily as needed (edema). 10/3/24 10/3/25  Paula Cooper NP   hydroxychloroquine (PLAQUENIL) 200 mg tablet Take 1 tablet (200 mg total) by mouth 2 (two) times daily. 10/3/19   Billy Mahan MD   lancets (LANCETS,THIN) Misc Check BS once daily 11/15/24   Paula Cooper NP   lisinopriL 10 MG tablet Take 1 tablet (10 mg total) by mouth every evening. for 120 doses 10/29/24 2/26/25  Anthony Liao MD   metoprolol succinate (TOPROL-XL) 50 MG 24 hr tablet Take 1 tablet (50 mg total) by mouth 2 (two) times daily. 10/29/24 10/29/25  Anthony Liao MD   nystatin (MYCOSTATIN) cream Apply topically 2 (two) times daily. 10/3/24   Paula Cooper NP   nystatin (MYCOSTATIN) powder Apply topically 4 (four) times daily. 1/7/25   Paula Cooper NP   polyethylene glycol (GLYCOLAX) 17 gram/dose powder Take 17 g by mouth once daily. 2/3/22   Leona Boyer MD   triamcinolone acetonide 0.1% (KENALOG) 0.1 % cream Apply topically 2 (two) times daily. for 7 days 5/7/21 5/14/21  Paula Cooper NP       Physical exam:    Vitals: BP (!) 119/55   Pulse 94   Temp (!) 100.7 °F (38.2 °C) (Axillary)   Resp (!) 21   Ht 4' 11" (1.499 m)   Wt 51.8 kg (114 lb 3.2 oz)   SpO2 98%   Breastfeeding No   BMI 23.07 kg/m²     General:   Somnolent  Head/Neck:   Normocephalic,atraumatic  Pulm:  Non-labored breathing     Mental Status: Moderate dysarthria, does not follow one step commands  CN:  II: blink to threat on OD  III, IV, VI: unable to completely assess  VII: Facial movement full and symmetric.   VIII: Hearing grossly normal to conversation.  IX, X: does not open mouth to command  Motor: Normal bulk throughout all four extremities.   LUE: does not participate with exam, spontaneous movement " appreciated  RUE:  does not participate with exam, spontaneous movement appreciated  LLE:  does not participate with exam, withdraws  RLE: does not participate with exam, withdraws  No tremors   Sensory: Does not withdraw to noxious stim on distal RUE  Reflexes: LUE: Biceps 2+ brachioradialis 2+  RUE: Biceps 2+, brachioradialis 2+  LLE: Knee 2+  RLE: Knee 2+  Coordination:  Unable to assess  Gait: bed bound    Imaging:  All pertinent imaging was personally reviewed.    On my personal review:   MRI shows right MCA distribution infarct (1/29), more recent CT (1/30) does not show significant unilateral swelling.      Results for orders placed during the hospital encounter of 01/28/25    MRI Brain Without Contrast    Narrative  EXAMINATION:  MRI BRAIN WITHOUT CONTRAST    CLINICAL HISTORY:  Stroke, follow up;    TECHNIQUE:  Brain studied using sagittal and axial T1, axial T2, FLAIR and DW Images.    COMPARISON:  CT head 01/28/2025    FINDINGS:  Areas of acute infarction are evident the largest of which right MCA distribution involving the anterior right perisylvian and lateral right frontal regions measuring approximately 4.9 x 2.9 cm and smaller lateral right parietooccipital cortical infarct measuring approximately 2.6 x 2.1 cm as seen on the prior CT.  Associated localized mass effect again noted including effacement of the adjacent portions ipsilateral right sylvian fissure and overlying cortical sulci.    Small possibly subacute or chronic posterior left frontal subcortical infarct measuring approximately 1.5 cm with small focus involvement of the overlying cortex noted.  Additional nonspecific T2 hyperintensities white matter may reflect moderate microvascular ischemic change.  Old lacunar infarcts and or incidental prominent perivascular spaces bilateral basal ganglia and thalami.  Presumed microvascular ischemic change central ange.    Visualized paranasal sinuses are clear as are mastoid air  cells.    Impression  Areas of acute infarction right MCA distribution largest involving the right perisylvian and lateral right frontal regions measuring 4.9 x 2.9 cm and smaller lateral right parietooccipital cortical infarct measuring 2.6 x 2.1 cm with associated localized mass effect including effacement of adjacent portions right sylvian fissure and overlying cortical sulci.    Nonacute posterior left frontal subcortical white matter infarct with small focus overlying cortical involvement.    Moderate presumed microvascular ischemic change white matter.      Electronically signed by: Saige Garcia MD  Date:    01/29/2025  Time:    09:03

## 2025-02-05 NOTE — PT/OT/SLP PROGRESS
Physical Therapy      Patient Name:  Eneida Majano   MRN:  [id number]    Patient not seen today secondary to Nurse/ ALLAN hold. Per nursing pt has not been able to follow commands appropriately today to participate with therapy. Will follow-up as able.    2/5/25-13:29

## 2025-02-05 NOTE — PROGRESS NOTES
"Progress Note  [facility]    Attending: Dr. Sanford  Admit Date: 2/3/2025  Today's Date: 02/05/2025    SUBJECTIVE:     Patient seen and examined this morning. Somnolent on exam this morning. Family present in the room who were updated on the patient's current care plan. Pt continues on esmolol gtt and has been rate controlled.     OBJECTIVE:     Vital Signs Trends/Hx Reviewed  Vitals:    02/05/25 1000 02/05/25 1100 02/05/25 1122 02/05/25 1200   BP: 125/73 131/79  (!) 140/92   Pulse: 98 90 97 91   Resp: 19 16  18   Temp:  98.1 °F (36.7 °C)     TempSrc:  Axillary     SpO2: 96% 98%  98%   Weight:       Height:         Physical Examination:  Physical Exam  Constitutional:       Comments: Not alert or oriented.   HENT:      Head: Normocephalic and atraumatic.   Eyes:      Pupils: Pupils are equal, round, and reactive to light.   Cardiovascular:      Rate and Rhythm: Normal rate. Rhythm irregular.      Pulses: Normal pulses.      Heart sounds: Normal heart sounds.   Pulmonary:      Effort: Pulmonary effort is normal.      Comments: Minimal bibasilar crackles  Abdominal:      General: Abdomen is flat.      Palpations: Abdomen is soft.   Musculoskeletal:      Cervical back: Neck supple.   Skin:     General: Skin is warm and dry.   Neurological:      Mental Status: She is disoriented.      Comments: Unable to assess 2/2 to mental status; Minimal response to painful stimulation; tracking with eyes; unable to move right upper extremity    Laboratory:  No results for input(s): "WBC", "RBC", "HGB", "HCT", "PLT", "MCV", "MCH", "MCHC" in the last 24 hours.    Recent Labs   Lab 02/05/25  0542      K 4.8      CO2 17*   BUN 29*   CREATININE 1.2   CALCIUM 8.5*   MG 1.9     No results for input(s): "PH", "PCO2", "PO2", "HCO3", "POCSATURATED", "BE" in the last 24 hours.        Chest Imaging:   Reviewed    ASSESSMENT & RECOMMENDATIONS   Eneida Majano is a 84 y.o. female with a PMHx of " atrial fibrillation (on eliquis), RA, T2DM, GERD, HTN, HLD, and recent R MCA acute ischemic stroke with occlusion of right M2 branch who presented to [facility] on 2/3/25 as a transfer from OSH for atrial fibrillation with RVR. Upon presentation to [facility], patient encephalopathic with minimal response to painful stimuli. Protecting airway and saturating well on 2L NC with episodes of apnea. Pt initially in afib with RVR to 140s and normotensive, however, later became hypotensive with MAPS in the 50s. Currently on esmolol gtt, and digoxin for rate control; cardiology following.     Neuro/Psych  #Right MCA Stroke   #Acute Encephalopathy  - patient presents with R MCA stroke seen on MRI at OSH with occlusion of right M2 branch; patient not a candidate for tPA due to last known normal >4.5 hours.   - initial deficits were dysphagia, left sided weakness and confusion  - AIS likely cardioembolic in origin due to eliquis non-adherence per chart review  - Neurology consulted; appreciate recommendations              -continue high intensity statin              - OK to start eliquis 2-14 days after stroke               - consider more permanent intervention for atrial fibrillation               -repeat CT head- evolving right MCA vascular territory infarct without evidence of hemorrhage               - EEG ordered per neuro- abnormal EEG- no seizure activity seen   - 2/4/25- repeat MRI recommended by neuro due to new focal neuro deficit- stable bilateral acute/ subacute supratentorial infarcts without significant extension or hemorrhagic conversion    CV  #Atrial Fibrillation with RVR  #HFrEF with EF 28%  - patient in afib with RVR to the 140s-150s with normal BP initially; but have achieved rate control  - placed on esmolol gtt at OSH; continue to wean so that the patient can be stepped down to the floor  - lopressor 50 mg q6 oral; NG tube placed for administration  - dig level < 0.1; continue digoxin per cardiology  - continue  atorvastatin 80 mg  - keep K >4, Mg >2    Pulm  #Acute Hypoxic Respiratory Failure  - CXR without focal consolidation- no concern for PNA at this time; WBC wnl  - weaned off from 2L to RA; will continue to monitor but currently protecting airway    FEN/GI  - started tube feeds; monitor lytes for refeeding syndrome  - replete lytes prn  - bowel regimen with bisacodyl suppository, senna     RENAL  #SUSY  - Cr 1.4 increased from 1.1 yesterday  - likely pre-renal etiology  - will monitor with tube feeds; Cr 1.2 today, will CTM     Heme  - no acute concerns      Endo  - SSI + Accuchecks  - Goal glucose 140-180 while in ICU    ID  - no acute concerns for infectious etiology as patient has been afebrile and without lekocytosis  -UA negative  - blood cultures negative     ICU Checklist  Feeding: tube feeds  Analgesia: None  Sedation: None  Thrombo PPX: eliquis 2.5 mg bid  Head of Bed: > 30 degrees  Ulcer PPX: famotidine  Glucose: goal 140-180s, hypoglycemic ppx  SAT/SBT: NA  Bowel Regimen: senna, bisacodyl  Indwelling Lines: PIV  Abx: none  Family Discussions: Family at bedside; informed of patient's clinical condition and prognosis   Code Status: Full  Dispo: Continue ICU Care     Code Status: Full    Lowell Serna DO  [employer] [facility] PGY1     Pt seen with Dr. Sanford. Attending attestation to follow.

## 2025-02-05 NOTE — PLAN OF CARE
went to meet with patient and family. Patient noted to be a transfer from [facility]. SW/CM was working on IPR placement at the time. MD reports patient may need LTAC placement at discharge.  to follow-up on patient's conditions/needs. GI consulted, will follow-up on recommendations. Family encouraged to call with any questions or concerns.  will continue to follow patient through transitions of care and assist with any discharge needs.     Emergency Contacts    Name Relation Home Work Mobile   Adelina Majano   [phone]     Future Appointments   Date Time Provider Department Center   2/11/2025 11:30 AM [facility]   2/18/2025 11:00 AM Paula Cooper NP [facility] MED [facility]   2/20/2025 11:15 AM OPHTHALMOLOGY TESTING, [facility]AL [facility]   2/20/2025 12:30 PM JOANNE Kimbrough IV, MD [facility]AL [facility]   4/3/2025  7:50 AM [facility] LAB [facility]   4/10/2025  9:00 AM Paula Cooper NP [facility] FAM MED [facility]   6/16/2025  9:00 AM Pepe Izaguirre MD [facility] DERM [facility]         02/05/25 1608   Discharge Reassessment   Assessment Type Discharge Planning Reassessment   Did the patient's condition or plan change since previous assessment? Yes   Discharge Plan discussed with: Sibling;Adult children   Discharge Plan A   (TBD)   Discharge Plan B   (LTAC VERSUS IPR?)   DME Needed Upon Discharge  other (see comments)  (TBD)   Transition of Care Barriers None   Why the patient remains in the hospital Requires continued medical care   Post-Acute Status   Post-Acute Authorization Placement   Post-Acute Placement Status Referrals Sent   Discharge Delays None known at this time     Natacha Harrison RN    [phone]

## 2025-02-05 NOTE — ASSESSMENT & PLAN NOTE
Patient has paroxysmal (<7 days) atrial fibrillation. Patient is currently in atrial fibrillation. [id number] Score: 5. The patients heart rate in the last 24 hours is as follows:  Pulse  Min: 79  Max: 135     Antiarrhythmics  esmolol 2000 mg in sodium chloride 0.9% 100 mL (20 mg/mL), Continuous, Intravenous  metoprolol injection 5 mg, Every 6 hours PRN, Intravenous  metoprolol tartrate (LOPRESSOR) tablet 50 mg, Every 6 hours, Oral    Anticoagulants  apixaban tablet 2.5 mg, 2 times daily, Oral    Plan  - Replete lytes with a goal of K>4, Mg >2  - Patient is not anticoagulated due to recent history of massive stroke, after clearance from Neurology can consider starting patient on anticoagulated on 2/for  - Patient's afib is currently uncontrolled. Will adjust treatment as follows:  Cardiology consulted currently on esmolol drip, digoxin level than load with digoxin    Wean esmolol drip to metoprolol

## 2025-02-05 NOTE — ASSESSMENT & PLAN NOTE
Advance Care Planning     Date: 02/04/2025    Code Status  Code status unknown. we agreed to leave patient as full code until code status is confirmed  by family./     A total of 20 min was spent on advance care planning, goals of care discussion, emotional support, formulating and communicating prognosis and exploring burden/benefit of various approaches of treatment. This discussion occurred on a fully voluntary basis with the verbal consent of the patient and/or family.      Advance Care Planning     Date: 02/05/2025  Primary team and pulmonary crit met with patient family at the bedside, updated on clinical status and imaging reports. Also discussed possible disease trajectory, family open to supportive treatment for now to include peg tube placement and possible LTAC placement. Code status also discussed and family wants to continue present treament regimen but make patient DNR in the event she decline clinically.  Questions and concerns were addressed          Code Status  In light of the patients advanced and life limiting illness,I engaged the the family in a voluntary conversation about the patient's preferences for care  at the very end of life. The patient wishes to have a natural, peaceful death.  Along those lines, the patient does not wish to have CPR or other invasive treatments performed when her heart and/or breathing stops. I communicated to the family that a DNR order would be placed in her medical record to reflect this preference.    A total of 15 min was spent on advance care planning, goals of care discussion, emotional support, formulating and communicating prognosis and exploring burden/benefit of various approaches of treatment. This discussion occurred on a fully voluntary basis with the verbal consent of the patient and/or family.

## 2025-02-05 NOTE — PLAN OF CARE
Re:  Eneida Majano, WORK / SCHOOL EXCUSE    Date: 02/05/2025           [facility] [place] [facility] [address] [place] [address]  Office: [phone]  Fax: [phone]     To whom it may concern:    Ms. Eneida Majano has been hospitalized at the [facility] [place] since 2/3/2025.  Please excuse grand daughter Poornima Garduno to come visit with the patient.       Please contact me if you have any questions.                __________________________  Chhaya Vilchis MD

## 2025-02-05 NOTE — PLAN OF CARE
Recommendation:  1. Change TF to Isosource 1.5. Initiate at 10ml/hr and advance as tolerated to goal rate of 40ml/hr which would provide 1440 kcal, 81g protein, & 916ml free water.     2. Monitor weight/labs.     3. RD to follow to monitor TF tolerance.    Goals:  TF to meet at least 85% EEN/EPN by RD follow up  Nutrition Goal Status: new

## 2025-02-05 NOTE — EICU
Intervention Initiated From:  COR / JESÚS    Linda intervened regarding:  Other      Doctor Notified:  Yes    Comments: RN requests order for a new midline.    VIVIAN Merritt M.D. notified.

## 2025-02-06 ENCOUNTER — ANESTHESIA EVENT (OUTPATIENT)
Dept: ENDOSCOPY | Facility: HOSPITAL | Age: 85
End: 2025-02-06
Payer: MEDICARE

## 2025-02-06 PROBLEM — N39.0 UTI (URINARY TRACT INFECTION): Status: ACTIVE | Noted: 2025-02-06

## 2025-02-06 LAB
ALBUMIN SERPL BCP-MCNC: 2.7 G/DL (ref 3.5–5.2)
ANION GAP SERPL CALC-SCNC: 8 MMOL/L (ref 8–16)
BACTERIA UR CULT: NORMAL
BACTERIA UR CULT: NORMAL
BASOPHILS # BLD AUTO: 0.02 K/UL (ref 0–0.2)
BASOPHILS NFR BLD: 0.2 % (ref 0–1.9)
BUN SERPL-MCNC: 23 MG/DL (ref 8–23)
CALCIUM SERPL-MCNC: 8.9 MG/DL (ref 8.7–10.5)
CHLORIDE SERPL-SCNC: 108 MMOL/L (ref 95–110)
CO2 SERPL-SCNC: 21 MMOL/L (ref 23–29)
CREAT SERPL-MCNC: 0.9 MG/DL (ref 0.5–1.4)
DIFFERENTIAL METHOD BLD: ABNORMAL
EOSINOPHIL # BLD AUTO: 0.1 K/UL (ref 0–0.5)
EOSINOPHIL NFR BLD: 0.8 % (ref 0–8)
ERYTHROCYTE [DISTWIDTH] IN BLOOD BY AUTOMATED COUNT: 15.4 % (ref 11.5–14.5)
EST. GFR  (NO RACE VARIABLE): >60 ML/MIN/1.73 M^2
GLUCOSE SERPL-MCNC: 143 MG/DL (ref 70–110)
HCT VFR BLD AUTO: 38.6 % (ref 37–48.5)
HGB BLD-MCNC: 12.1 G/DL (ref 12–16)
IMM GRANULOCYTES # BLD AUTO: 0.03 K/UL (ref 0–0.04)
IMM GRANULOCYTES NFR BLD AUTO: 0.3 % (ref 0–0.5)
LYMPHOCYTES # BLD AUTO: 0.7 K/UL (ref 1–4.8)
LYMPHOCYTES NFR BLD: 8 % (ref 18–48)
MAGNESIUM SERPL-MCNC: 1.9 MG/DL (ref 1.6–2.6)
MCH RBC QN AUTO: 28.7 PG (ref 27–31)
MCHC RBC AUTO-ENTMCNC: 31.3 G/DL (ref 32–36)
MCV RBC AUTO: 92 FL (ref 82–98)
MONOCYTES # BLD AUTO: 1.1 K/UL (ref 0.3–1)
MONOCYTES NFR BLD: 11.4 % (ref 4–15)
NEUTROPHILS # BLD AUTO: 7.4 K/UL (ref 1.8–7.7)
NEUTROPHILS NFR BLD: 79.3 % (ref 38–73)
NRBC BLD-RTO: 0 /100 WBC
PHOSPHATE SERPL-MCNC: 2.4 MG/DL (ref 2.7–4.5)
PLATELET # BLD AUTO: 264 K/UL (ref 150–450)
PMV BLD AUTO: 10.3 FL (ref 9.2–12.9)
POCT GLUCOSE: 105 MG/DL (ref 70–110)
POCT GLUCOSE: 109 MG/DL (ref 70–110)
POCT GLUCOSE: 118 MG/DL (ref 70–110)
POCT GLUCOSE: 123 MG/DL (ref 70–110)
POCT GLUCOSE: 127 MG/DL (ref 70–110)
POCT GLUCOSE: 133 MG/DL (ref 70–110)
POTASSIUM SERPL-SCNC: 4.3 MMOL/L (ref 3.5–5.1)
RBC # BLD AUTO: 4.21 M/UL (ref 4–5.4)
SODIUM SERPL-SCNC: 137 MMOL/L (ref 136–145)
WBC # BLD AUTO: 9.3 K/UL (ref 3.9–12.7)

## 2025-02-06 PROCEDURE — 25000003 PHARM REV CODE 250: Performed by: INTERNAL MEDICINE

## 2025-02-06 PROCEDURE — 11000001 HC ACUTE MED/SURG PRIVATE ROOM

## 2025-02-06 PROCEDURE — 99900035 HC TECH TIME PER 15 MIN (STAT)

## 2025-02-06 PROCEDURE — 83735 ASSAY OF MAGNESIUM: CPT | Performed by: INTERNAL MEDICINE

## 2025-02-06 PROCEDURE — 25000003 PHARM REV CODE 250: Performed by: STUDENT IN AN ORGANIZED HEALTH CARE EDUCATION/TRAINING PROGRAM

## 2025-02-06 PROCEDURE — 25000003 PHARM REV CODE 250

## 2025-02-06 PROCEDURE — 94761 N-INVAS EAR/PLS OXIMETRY MLT: CPT

## 2025-02-06 PROCEDURE — 63600175 PHARM REV CODE 636 W HCPCS: Performed by: FAMILY MEDICINE

## 2025-02-06 PROCEDURE — 27000221 HC OXYGEN, UP TO 24 HOURS

## 2025-02-06 PROCEDURE — 25000003 PHARM REV CODE 250: Performed by: FAMILY MEDICINE

## 2025-02-06 PROCEDURE — 25000003 PHARM REV CODE 250: Performed by: NURSE PRACTITIONER

## 2025-02-06 PROCEDURE — 85025 COMPLETE CBC W/AUTO DIFF WBC: CPT | Performed by: INTERNAL MEDICINE

## 2025-02-06 PROCEDURE — 97110 THERAPEUTIC EXERCISES: CPT

## 2025-02-06 PROCEDURE — 63600175 PHARM REV CODE 636 W HCPCS: Performed by: INTERNAL MEDICINE

## 2025-02-06 PROCEDURE — 80069 RENAL FUNCTION PANEL: CPT | Performed by: INTERNAL MEDICINE

## 2025-02-06 RX ORDER — CEFTRIAXONE 2 G/1
2 INJECTION, POWDER, FOR SOLUTION INTRAMUSCULAR; INTRAVENOUS
Status: DISCONTINUED | OUTPATIENT
Start: 2025-02-06 | End: 2025-02-10

## 2025-02-06 RX ORDER — SCOPOLAMINE 1 MG/3D
1 PATCH, EXTENDED RELEASE TRANSDERMAL
Status: DISCONTINUED | OUTPATIENT
Start: 2025-02-06 | End: 2025-02-12 | Stop reason: HOSPADM

## 2025-02-06 RX ADMIN — Medication 200 ML: at 09:02

## 2025-02-06 RX ADMIN — SENNOSIDES AND DOCUSATE SODIUM 1 TABLET: 8.6; 5 TABLET ORAL at 08:02

## 2025-02-06 RX ADMIN — METOPROLOL TARTRATE 75 MG: 50 TABLET, FILM COATED ORAL at 10:02

## 2025-02-06 RX ADMIN — Medication 200 ML: at 12:02

## 2025-02-06 RX ADMIN — LISINOPRIL 10 MG: 2.5 TABLET ORAL at 08:02

## 2025-02-06 RX ADMIN — METOPROLOL TARTRATE 75 MG: 50 TABLET, FILM COATED ORAL at 08:02

## 2025-02-06 RX ADMIN — HYDROXYCHLOROQUINE SULFATE 200 MG: 200 TABLET, FILM COATED ORAL at 08:02

## 2025-02-06 RX ADMIN — Medication 200 ML: at 11:02

## 2025-02-06 RX ADMIN — ATORVASTATIN CALCIUM 80 MG: 40 TABLET, FILM COATED ORAL at 08:02

## 2025-02-06 RX ADMIN — CEFTRIAXONE SODIUM 2 G: 2 INJECTION, POWDER, FOR SOLUTION INTRAMUSCULAR; INTRAVENOUS at 09:02

## 2025-02-06 RX ADMIN — METOPROLOL TARTRATE 50 MG: 50 TABLET, FILM COATED ORAL at 06:02

## 2025-02-06 RX ADMIN — METOPROLOL TARTRATE 75 MG: 50 TABLET, FILM COATED ORAL at 03:02

## 2025-02-06 RX ADMIN — ACETAMINOPHEN 650 MG: 650 SUPPOSITORY RECTAL at 04:02

## 2025-02-06 RX ADMIN — METOPROLOL TARTRATE 75 MG: 50 TABLET, FILM COATED ORAL at 01:02

## 2025-02-06 RX ADMIN — HYDROXYZINE PAMOATE 25 MG: 25 CAPSULE ORAL at 03:02

## 2025-02-06 RX ADMIN — FAMOTIDINE 20 MG: 10 INJECTION, SOLUTION INTRAVENOUS at 08:02

## 2025-02-06 RX ADMIN — SCOPOLAMINE 1 PATCH: 1.5 PATCH, EXTENDED RELEASE TRANSDERMAL at 01:02

## 2025-02-06 RX ADMIN — Medication 200 ML: at 08:02

## 2025-02-06 RX ADMIN — Medication 200 ML: at 05:02

## 2025-02-06 RX ADMIN — Medication 200 ML: at 06:02

## 2025-02-06 RX ADMIN — METOPROLOL TARTRATE 50 MG: 50 TABLET, FILM COATED ORAL at 12:02

## 2025-02-06 RX ADMIN — DIGOXIN 0.12 MG: 125 TABLET ORAL at 08:02

## 2025-02-06 RX ADMIN — Medication 200 ML: at 01:02

## 2025-02-06 RX ADMIN — HALOPERIDOL LACTATE 1 MG: 5 INJECTION, SOLUTION INTRAMUSCULAR at 01:02

## 2025-02-06 NOTE — ASSESSMENT & PLAN NOTE
Secondary to stroke  Asked to eval for PEG tube    Recs  Family in agreement, thus plan PEG tomorrow  Npo past mn  Continue hold eliquis

## 2025-02-06 NOTE — PLAN OF CARE
spoke with Brinda at [facility] ([phone]) closest location to patient's home. She reported that in referral sent yesterday need to speak with her MD again to see if meets LTAC criteria. I updated her PEG tube to be placed tomorrow. She asked that I resend referral in EPIC. Referral resent. She does have EPIC access as well.  will continue to follow patient through transitions of care and assist with any discharge needs.      spoke with Brinda at [facility] again. She reports will touch base tomorrow but if patient would meet criteria they can submit for insurance auth tomorrow since patient getting peg tube at this time. I will follow-up with her.     met with patient and granddaughter at bedside. I contacted both daughters to educate them on LTAC criteria, insurance approval, etc. They would like to pursue this if meets criteria. I updated them patient's insurance is in network with [facility] which would be closest facility to patient's home. Daughter has heard of them before and in agreement. I told them will speak to LTAC rep again tomorrow and follow-up with them. All questions answered.    Other Contacts    Name Relation Home Work Mobile   harrison tripp Sister   [phone]   Tania Majano Daughter   [phone]     Future Appointments   Date Time Provider Department Center   2/11/2025 11:30 AM [facility] US1 [facility] [place]   2/18/2025 11:00 AM Paula Cooper NP [facility] [place] [facility]   2/20/2025 11:15 AM OPHTHALMOLOGY TESTING, [facility]   2/20/2025 12:30 PM JOANNE Kimbrough IV, MD [facility] OPHTHAL [facility]   4/3/2025  7:50 AM [facility] [place]   4/10/2025  9:00 AM Paula Cooper NP [facility] [place] [facility]   6/16/2025  9:00 AM Pepe Izaguirre MD [facility] DERM [facility]         02/06/25 1331   Discharge Reassessment   Assessment Type Discharge Planning Reassessment   Did the patient's condition or plan change since previous  assessment? Yes   Discharge Plan A Long-term acute care facility (LTAC)   Discharge Plan B   (TBD?)   DME Needed Upon Discharge  other (see comments)  (TBD)   Transition of Care Barriers None   Why the patient remains in the hospital Requires continued medical care   Post-Acute Status   Post-Acute Authorization Placement   Post-Acute Placement Status Referrals Sent   Discharge Delays None known at this time     Natacha Harrison RN    [phone]

## 2025-02-06 NOTE — SUBJECTIVE & OBJECTIVE
Subjective:     Interval History:   No new changes  Granddaughter bedside    Consent obtained by daughter over phone  All questions answered    Anticoag stopped      Review of Systems   Unable to perform ROS: Mental status change     Objective:     Vital Signs (Most Recent):  Temp: 99.2 °F (37.3 °C) (02/06/25 1125)  Pulse: 86 (02/06/25 1300)  Resp: (!) 21 (02/06/25 1300)  BP: (!) 142/62 (02/06/25 1300)  SpO2: 99 % (02/06/25 1300) Vital Signs (24h Range):  Temp:  [98.3 °F (36.8 °C)-99.2 °F (37.3 °C)] 99.2 °F (37.3 °C)  Pulse:  [] 86  Resp:  [15-42] 21  SpO2:  [95 %-99 %] 99 %  BP: (119-183)/() 142/62     Weight: 51.8 kg (114 lb 3.2 oz) (02/04/25 2059)  Body mass index is 23.07 kg/m².      Intake/Output Summary (Last 24 hours) at 2/6/2025 1404  Last data filed at 2/6/2025 1300  Gross per 24 hour   Intake 450.94 ml   Output 600 ml   Net -149.06 ml       Lines/Drains/Airways       Drain  Duration             Female External Urinary Catheter w/ Suction 02/03/25 0600 3 days         NG/OG Tube 02/04/25 1148 16 Fr. Left nostril 2 days              Peripheral Intravenous Line  Duration                  Peripheral IV - Single Lumen 02/03/25 2341 22 G Left;Posterior Hand 2 days         Midline Catheter - Single Lumen 02/04/25 2140 Left brachial vein other (see comments) 1 day                     Physical Exam  Vitals reviewed.   Constitutional:       Appearance: She is ill-appearing.   Eyes:      General: No scleral icterus.     Conjunctiva/sclera: Conjunctivae normal.   Abdominal:      Palpations: Abdomen is soft. There is no mass.      Hernia: No hernia is present.   Skin:     General: Skin is dry.      Coloration: Skin is not pale.   Neurological:      Mental Status: She is disoriented.          Significant Labs:  CBC:   Recent Labs   Lab 02/06/25  0422   WBC 9.30   HGB 12.1   HCT 38.6        BMP:   Recent Labs   Lab 02/06/25  0422   *      K 4.3      CO2 21*   BUN 23   CREATININE  0.9   CALCIUM 8.9   MG 1.9     CMP:   Recent Labs   Lab 02/06/25  0422   *   CALCIUM 8.9   ALBUMIN 2.7*      K 4.3   CO2 21*      BUN 23   CREATININE 0.9         Significant Imaging:  Imaging results within the past 24 hours have been reviewed.

## 2025-02-06 NOTE — PROGRESS NOTES
[facility] Medicine  Progress Note    Patient Name: Eneida Majano  MRN: [id number]  Patient Class: IP- Inpatient   Admission Date: 2/3/2025  Length of Stay: 3 days  Attending Physician: Chhaya Vilchis*  Primary Care Provider: Paula Cooper NP        Subjective     Principal Problem:Cerebrovascular accident (CVA) due to embolism of right middle cerebral artery        HPI:  84-year-old female with a history of anemia, atrial fibrillation/flutter (on Eliquis), rheumatoid arthritis, diabetes, gastroesophageal reflux disease, hypertension, hyperlipidemia, and insomnia , pt was transferred to [facility] on January 28 with aphasia/dysarthria, right lateral gaze, and left-sided weakness. CT head showed evidence of a right MCA distribution stroke along with other areas of infarct. CTA of the head and neck showed occlusion of the right M2 branch. She had Vascular Neurology tele-consultation and was felt not to be a thrombolytic candidate. She was also not a thrombectomy candidate with a large core infarct on imaging. MRI brain on 1/29 also showed the areas of infarct. She was admitted with right MCA stroke, atrial flutter/fibrillation with rapid ventricular response, and encephalopathy. Anticoagulation was held due to concern for potential hemorrhagic conversion. Also troponin was elevated and she had echocardiogram that showed EF 28% with decreased right ventricular systolic function and moderate pulmonary hypertension. Mentation improved somewhat by January 31. She was placed on Plavix with plans to resume anticoagulation after 7 days. By February 1, she had increased confusion. She was more tachycardic. On the morning of February 3, tachycardia persisted and she was upgraded in status and started on esmolol infusion. She was seen by Cardiology. On the morning of February 3 she was awake but confused and not following commands. She was unable to swallow oral medicines.  Blood pressure remained stable. Pt was transfer to Hospital Medicine at [facility] in ICU status for continued rate control with esmolol.       On day of transfer to [facility] :  February 3: Sodium 138, potassium 4.1, chloride 108, CO2 19, BUN 13, creatinine 0.8, glucose 125, magnesium 1.9, white blood cells 7.04, hemoglobin 12.8, hematocrit 39.8, platelets 256  -EKG showed atrial fibrillation with ventricular rate 123. T-wave abnormality.    January 31: AST 23, ALT 20  -abdominal ultrasound had no acute findings    January 30: CT head showed redemonstration of acute right MCA infarct. No acute intracranial hemorrhage.  -INR 1.1  -echocardiogram had EF 28%. Unable to assess diastolic function due to atrial fibrillation. Decreased right ventricular systolic function. Right atrium is dilated. Moderate mitral regurgitation. Moderate tricuspid regurgitation. Moderate pulmonary hypertension with estimated PA systolic pressure 60 mmHg. Intermediate venous pressure at mmHg.    January 29: MRI brain had areas of acute infarction in the right MCA distribution largest involving the right perisylvian and lateral right frontal regions and smaller lateral right parieto-occipital cortical infarct with associated localized mass effect including effacement of adjacent portions of the right sylvian fissure and overlying cortical sulci. Non acute posterior left frontal subcortical white matter infarct. Moderate presumed microvascular ischemic change     Overview/Hospital Course:  No notes on file    Interval History: lying in bed, unresponsive, weaned off esmolol drip to Metoprolol.     Granddaughter at bedside reports minimal agitation overnight.   Reports unable to control secretion - scopolamine in placement     Appreciates SLP rec's -remain NPO- appreciates GI plan PEG tube placement on 2/7  Step down to floor      Review of Systems   Unable to perform ROS: Patient unresponsive     Objective:     Vital Signs  "(Most Recent):  Temp: 98.4 °F (36.9 °C) (02/06/25 0800)  Pulse: (!) 117 (02/06/25 0900)  Resp: (!) 25 (02/06/25 0900)  BP: (!) 157/97 (02/06/25 0900)  SpO2: 96 % (02/06/25 0900) Vital Signs (24h Range):  Temp:  [98.1 °F (36.7 °C)-98.9 °F (37.2 °C)] 98.4 °F (36.9 °C)  Pulse:  [] 117  Resp:  [15-42] 25  SpO2:  [95 %-98 %] 96 %  BP: (119-183)/() 157/97     Weight: 51.8 kg (114 lb 3.2 oz)  Body mass index is 23.07 kg/m².    Intake/Output Summary (Last 24 hours) at 2/6/2025 0919  Last data filed at 2/6/2025 0901  Gross per 24 hour   Intake 453.99 ml   Output 750 ml   Net -296.01 ml         Physical Exam  Constitutional:       General: She is not in acute distress.     Appearance: She is ill-appearing. She is not toxic-appearing.      Comments: Very cachectic   HENT:      Head: Normocephalic and atraumatic.   Cardiovascular:      Rate and Rhythm: Normal rate. Rhythm irregular.   Pulmonary:      Effort: Pulmonary effort is normal.      Breath sounds: Normal breath sounds.   Abdominal:      General: Abdomen is flat.      Palpations: Abdomen is soft.   Musculoskeletal:         General: No swelling or tenderness.      Cervical back: Normal range of motion.      Right lower leg: No edema.      Left lower leg: No edema.   Skin:     General: Skin is dry.      Capillary Refill: Capillary refill takes less than 2 seconds.      Coloration: Skin is pale.   Neurological:      Mental Status: She is disoriented.             Significant Labs: A1C:   Recent Labs   Lab 10/01/24  0816 01/29/25  2300   HGBA1C 5.8* 5.7*     ABGs:   No results for input(s): "PH", "PCO2", "HCO3", "POCSATURATED", "BE", "TOTALHB", "COHB", "METHB", "O2HB", "POCFIO2", "PO2" in the last 48 hours.    Blood Culture:   No results for input(s): "LABBLOO" in the last 48 hours.    CBC:   Recent Labs   Lab 02/06/25  0422   WBC 9.30   HGB 12.1   HCT 38.6        CMP:   Recent Labs   Lab 02/05/25  0542 02/06/25  0422    137   K 4.8 4.3    108 " "  CO2 17* 21*   GLU 96 143*   BUN 29* 23   CREATININE 1.2 0.9   CALCIUM 8.5* 8.9   ALBUMIN  --  2.7*   ANIONGAP 11 8     Cardiac Markers:   No results for input(s): "CKMB", "MYOGLOBIN", "BNP", "TROPISTAT" in the last 48 hours.    Lactic Acid:   No results for input(s): "LACTATE" in the last 48 hours.    Lipase:   No results for input(s): "LIPASE" in the last 48 hours.    Lipid Panel: No results for input(s): "CHOL", "HDL", "LDLCALC", "TRIG", "CHOLHDL" in the last 48 hours.  Magnesium:   Recent Labs   Lab 02/05/25  0542 02/06/25  0422   MG 1.9 1.9     Troponin:   No results for input(s): "TROPONINI", "TROPONINIHS" in the last 48 hours.    TSH:   Recent Labs   Lab 02/03/25  1353   TSH 3.156     Urine Culture: No results for input(s): "LABURIN" in the last 48 hours.  Urine Studies:   Recent Labs   Lab 02/05/25  1331   COLORU Yellow   APPEARANCEUA Cloudy*   PHUR >8.0*   SPECGRAV >1.030*   PROTEINUA 3+*   GLUCUA Negative   KETONESU Trace*   BILIRUBINUA Negative   OCCULTUA 2+*   NITRITE Negative   UROBILINOGEN 2.0-3.0*   LEUKOCYTESUR 3+*   RBCUA 40*   WBCUA >100*   BACTERIA Moderate*   HYALINECASTS 0       Significant Imaging: I have reviewed all pertinent imaging results/findings within the past 24 hours.    Assessment and Plan     * Cerebrovascular accident (CVA) due to embolism of right middle cerebral artery  Presented on January 28, 2025 with large stroke, patient was not a candidate for tPA versus thrombectomy  Tele stroke was consulted.  The all anticoagulants to be started after in 7 days on February 4th  Neurology consulted at [facility] after arrival on 02/03       Antithrombotics for secondary stroke prevention: Antiplatelets: None:  Large size stroke on 01/28 patient to wait total of 7 days to restart anticoagulants on 2/for    Statins for secondary stroke prevention and hyperlipidemia, if present:   Statins: Atorvastatin- 40 mg daily    Aggressive risk factor modification: HTN, DM, A-Fib, CAD     Rehab " efforts: The patient has been evaluated by a stroke team provider and the therapy needs have been fully considered based off the presenting complaints and exam findings. The following therapy evaluations are needed: PT evaluate and treat, OT evaluate and treat, SLP evaluate and treat    Diagnostics ordered/pending: Carotid ultrasound to assess vasculature, CT scan of head without contrast to asses brain parenchyma, CTA Head to assess vasculature , HgbA1C to assess blood glucose levels, Lipid Profile to assess cholesterol levels, MRA head to assess vasculature, MRA neck/arch to assess vasculature, MRI head without contrast to assess brain parenchyma, TTE to assess cardiac function/status , TSH to assess thyroid function    MRA brain on 01/29  Areas of acute infarction right MCA distribution largest involving the right perisylvian and lateral right frontal regions measuring 4.9 x 2.9 cm and smaller lateral right parietooccipital cortical infarct measuring 2.6 x 2.1 cm with associated localized mass effect including effacement of adjacent portions right sylvian fissure and overlying cortical sulci.     Nonacute posterior left frontal subcortical white matter infarct with small focus overlying cortical involvement.  Moderate presumed microvascular ischemic change white matter.    CT head on 02/03  Evolving right MCA vascular territory infarcts    MRI brain on 2/4  Stable bilateral acute/subacute supratentorial infarcts without significant extension or hemorrhagic conversion.     Background of prominent periventricular white matter changes of chronic microvascular ischemia.    EEG    This is an abnormal EEG during stupor state.  The overall degree of disorganization and slowing for given age is suggestive of a moderate to severe encephalopathy, likely a toxic metabolic encephalopathy.  There is no evidence of an epileptic process on this recording.  No seizures were recorded during this study.   VTE prophylaxis: None:  Reason for No Pharmacological VTE Prophylaxis:  Due to large stroke per Neurology recommendation had [facility] patient to be started after 7 days (2/4    BP parameters: Infarct:  Out of the window at this moment to maintain blood pressure within normal values    UTI (urinary tract infection)  UA positive  Ceftriaxone pending cx      ACP (advance care planning)  Advance Care Planning    Date: 02/04/2025    Code Status  Code status unknown. we agreed to leave patient as full code until code status is confirmed  by family./     A total of 20 min was spent on advance care planning, goals of care discussion, emotional support, formulating and communicating prognosis and exploring burden/benefit of various approaches of treatment. This discussion occurred on a fully voluntary basis with the verbal consent of the patient and/or family.      Advance Care Planning    Date: 02/05/2025  Primary team and pulmonary crit met with patient family at the bedside, updated on clinical status and imaging reports. Also discussed possible disease trajectory, family open to supportive treatment for now to include peg tube placement and possible LTAC placement. Code status also discussed and family wants to continue present treament regimen but make patient DNR in the event she decline clinically.  Questions and concerns were addressed          Code Status  In light of the patients advanced and life limiting illness,I engaged the the family in a voluntary conversation about the patient's preferences for care  at the very end of life. The patient wishes to have a natural, peaceful death.  Along those lines, the patient does not wish to have CPR or other invasive treatments performed when her heart and/or breathing stops. I communicated to the family that a DNR order would be placed in her medical record to reflect this preference.    A total of 15 min was spent on advance care planning, goals of care discussion, emotional  support, formulating and communicating prognosis and exploring burden/benefit of various approaches of treatment. This discussion occurred on a fully voluntary basis with the verbal consent of the patient and/or family.            Acute on chronic heart failure  Cardiology was consulted currently patient is on esmolol drip.  Beta blockers scheduled  Patient is a euvolemic  Patient received loading dose of digoxin for AFib with RVR uncontrolled  Echo Saline Bubble? Yes; Ultrasound enhancing contrast? Yes    Result Date: 1/30/2025    Left Ventricle: The left ventricle is normal in size. Mildly increased   wall thickness. Global hypokinesis present. There is severely reduced   systolic function. Quantitated ejection fraction is 28%. Unable to assess   diastolic function due to atrial fibrillation.    Right Ventricle: Right ventricular enlargement. Systolic function is   reduced.    Left Atrium: Left atrium is severely dilated. Agitated saline study of   the atrial septum is negative after vasalva maneuver, suggesting absence   of intracardiac shunt at the atrial level.    Right Atrium: Right atrium is dilated.    Mitral Valve: There is moderate regurgitation.  EROA 0.31 cm2    Tricuspid Valve: There is moderate regurgitation.    Pulmonary Artery: There is moderate pulmonary hypertension. The   estimated pulmonary artery systolic pressure is 60 mmHg.    IVC/SVC: Intermediate venous pressure at 8 mmHg.         Acute encephalopathy  2/3 at [facility] sent for stat CT head  -TSH T4 ammonia levels  -a neurology consulted-appreciate recs    Repeat MRI brain  unchanged   EEG   This is an abnormal EEG during stupor state.  The overall degree of disorganization and slowing for given age is suggestive of a moderate to severe encephalopathy, likely a toxic metabolic encephalopathy.  There is no evidence of an epileptic process on this recording.  No seizures were recorded during this study.   Consult GI for PEG tube  placement- planned for 2/7    Stroke due to occlusion of right middle cerebral artery  Presented on January 28, 2025 with large stroke, patient was not a candidate for tPA versus thrombectomy  Tele stroke was consulted.  The all anticoagulants to be started after in 7 days on February 4th  Neurology consulted at [facility] after arrival on 02/03       Antithrombotics for secondary stroke prevention: Antiplatelets: None:  Large size stroke on 01/28 patient to wait total of 7 days to restart anticoagulants on 2/for    Statins for secondary stroke prevention and hyperlipidemia, if present:   Statins: Atorvastatin- 40 mg daily    Aggressive risk factor modification: HTN, DM, A-Fib, CAD     Rehab efforts: The patient has been evaluated by a stroke team provider and the therapy needs have been fully considered based off the presenting complaints and exam findings. The following therapy evaluations are needed: PT evaluate and treat, OT evaluate and treat, SLP evaluate and treat    Diagnostics ordered/pending: Carotid ultrasound to assess vasculature, CT scan of head without contrast to asses brain parenchyma, CTA Head to assess vasculature , HgbA1C to assess blood glucose levels, Lipid Profile to assess cholesterol levels, MRA head to assess vasculature, MRA neck/arch to assess vasculature, MRI head without contrast to assess brain parenchyma, TTE to assess cardiac function/status , TSH to assess thyroid function    VTE prophylaxis: None: Reason for No Pharmacological VTE Prophylaxis:  Due to large stroke per Neurology recommendation had [facility] patient to be started after 7 days (2/4    BP parameters: Infarct:  Out of the window at this moment to maintain blood pressure within normal values        Atrial fibrillation with RVR  Patient has paroxysmal (<7 days) atrial fibrillation. Patient is currently in atrial fibrillation. [id number] Score: 5. The patients heart rate in the last 24 hours is as  follows:  Pulse  Min: 79  Max: 135     Antiarrhythmics  esmolol 2000 mg in sodium chloride 0.9% 100 mL (20 mg/mL), Continuous, Intravenous  metoprolol injection 5 mg, Every 6 hours PRN, Intravenous  metoprolol tartrate (LOPRESSOR) tablet 50 mg, Every 6 hours, Oral    Anticoagulants  apixaban tablet 2.5 mg, 2 times daily, Oral    Plan  - Replete lytes with a goal of K>4, Mg >2  - Patient is not anticoagulated due to recent history of massive stroke, after clearance from Neurology can consider starting patient on anticoagulated on 2/for  - Patient's afib is currently uncontrolled. Will adjust treatment as follows:  Cardiology consulted currently on esmolol drip, digoxin level than load with digoxin    Wean esmolol drip to metoprolol      Essential hypertension  Patient's blood pressure range in the last 24 hours was: BP  Min: 114/94  Max: 189/116.The patient's inpatient anti-hypertensive regimen is listed below:  Current Antihypertensives  esmolol 2000 mg in sodium chloride 0.9% 100 mL (20 mg/mL), Continuous, Intravenous  labetaloL injection 10 mg, Every 6 hours PRN, Intravenous  metoprolol tartrate (LOPRESSOR) tablet 50 mg, Every 6 hours, Oral    Plan  - BP is controlled, no changes needed to their regimen  - controlled current medications are to control AFib RVR    Gastroesophageal reflux disease  On famotidine       T2DM (type 2 diabetes mellitus)  Patient's FSGs are controlled on current medication regimen.  Last A1c reviewed-   Lab Results   Component Value Date    HGBA1C 5.7 (H) 01/29/2025     Most recent fingerstick glucose reviewed-   Recent Labs   Lab 02/03/25  0300 02/03/25  0537 02/03/25  0814 02/03/25  1244   POCTGLUCOSE 137* 121* 126* 102     Current correctional scale  Low  Maintain anti-hyperglycemic dose as follows-   Antihyperglycemics (From admission, onward)      Start     Stop Route Frequency Ordered    02/03/25 1413  insulin aspart U-100 pen 0-5 Units         -- SubQ Every 6 hours PRN 02/03/25  1313          Hold Oral hypoglycemics while patient is in the hospital.      VTE Risk Mitigation (From admission, onward)           Ordered     enoxaparin injection 50 mg  Every 24 hours         02/05/25 1549     IP VTE HIGH RISK PATIENT  Once         02/03/25 1250     Place sequential compression device  Until discontinued         02/03/25 1250                    Discharge Planning   DELORES: 2/7/2025     Code Status: DNR   Medical Readiness for Discharge Date:   Discharge Plan A:  (TBD)   Discharge Delays: None known at this time        Critical care time spent on the evaluation and treatment of severe organ dysfunction, review of pertinent labs and imaging studies, discussions with consulting providers and discussions with patient/family: 35 minutes.    Please place Justification for DME        Chhaya Vilchis MD  Department of Hospital Medicine   [facility] - Intensive Care

## 2025-02-06 NOTE — PROGRESS NOTES
[facility] - Intensive Care  Gastroenterology  Progress Note    Patient Name: Eneida Majano  MRN: [id number]  Admission Date: 2/3/2025  Hospital Length of Stay: 3 days  Code Status: DNR   Attending Provider: Chhaya Vilchis*  Consulting Provider: Sidney Mason MD  Primary Care Physician: Paula Cooper NP  Principal Problem: Cerebrovascular accident (CVA) due to embolism of right middle cerebral artery        Subjective:     Interval History:   No new changes  Granddaughter bedside    Consent obtained by daughter over phone  All questions answered    Anticoag stopped      Review of Systems   Unable to perform ROS: Mental status change     Objective:     Vital Signs (Most Recent):  Temp: 99.2 °F (37.3 °C) (02/06/25 1125)  Pulse: 86 (02/06/25 1300)  Resp: (!) 21 (02/06/25 1300)  BP: (!) 142/62 (02/06/25 1300)  SpO2: 99 % (02/06/25 1300) Vital Signs (24h Range):  Temp:  [98.3 °F (36.8 °C)-99.2 °F (37.3 °C)] 99.2 °F (37.3 °C)  Pulse:  [] 86  Resp:  [15-42] 21  SpO2:  [95 %-99 %] 99 %  BP: (119-183)/() 142/62     Weight: 51.8 kg (114 lb 3.2 oz) (02/04/25 2059)  Body mass index is 23.07 kg/m².      Intake/Output Summary (Last 24 hours) at 2/6/2025 1404  Last data filed at 2/6/2025 1300  Gross per 24 hour   Intake 450.94 ml   Output 600 ml   Net -149.06 ml       Lines/Drains/Airways       Drain  Duration             Female External Urinary Catheter w/ Suction 02/03/25 0600 3 days         NG/OG Tube 02/04/25 1148 16 Fr. Left nostril 2 days              Peripheral Intravenous Line  Duration                  Peripheral IV - Single Lumen 02/03/25 2341 22 G Left;Posterior Hand 2 days         Midline Catheter - Single Lumen 02/04/25 2140 Left brachial vein other (see comments) 1 day                     Physical Exam  Vitals reviewed.   Constitutional:       Appearance: She is ill-appearing.   Eyes:      General: No scleral icterus.     Conjunctiva/sclera: Conjunctivae normal.   Abdominal:      Palpations:  Abdomen is soft. There is no mass.      Hernia: No hernia is present.   Skin:     General: Skin is dry.      Coloration: Skin is not pale.   Neurological:      Mental Status: She is disoriented.          Significant Labs:  CBC:   Recent Labs   Lab 02/06/25 0422   WBC 9.30   HGB 12.1   HCT 38.6        BMP:   Recent Labs   Lab 02/06/25 0422   *      K 4.3      CO2 21*   BUN 23   CREATININE 0.9   CALCIUM 8.9   MG 1.9     CMP:   Recent Labs   Lab 02/06/25  0422   *   CALCIUM 8.9   ALBUMIN 2.7*      K 4.3   CO2 21*      BUN 23   CREATININE 0.9         Significant Imaging:  Imaging results within the past 24 hours have been reviewed.  Assessment/Plan:     Neuro  Acute encephalopathy  Secondary to stroke  Asked to eval for PEG tube    Recs  Family in agreement, thus plan PEG tomorrow  Npo past mn  Continue hold eliquis            Thank you for your consult. I will follow-up with patient. Please contact us if you have any additional questions.    Sidney Mason MD  Gastroenterology  [facility] - Intensive Care

## 2025-02-06 NOTE — PT/OT/SLP PROGRESS
Speech Language Pathology      Eneida Majano  MRN: [id number]      9:25AM  Pt not appropriate for swallowing or PO trials.   Rec: NPO with consideration for non oral means of feeding/hydration. Agree with continued GOC discussion as well.  ST to sign off      2/6/2025     Xray Chest 2 Views PA/Lat

## 2025-02-06 NOTE — ASSESSMENT & PLAN NOTE
Presented on January 28, 2025 with large stroke, patient was not a candidate for tPA versus thrombectomy  Tele stroke was consulted.  The all anticoagulants to be started after in 7 days on February 4th  Neurology consulted at [facility] after arrival on 02/03       Antithrombotics for secondary stroke prevention: Antiplatelets: None:  Large size stroke on 01/28 patient to wait total of 7 days to restart anticoagulants on 2/for    Statins for secondary stroke prevention and hyperlipidemia, if present:   Statins: Atorvastatin- 40 mg daily    Aggressive risk factor modification: HTN, DM, A-Fib, CAD     Rehab efforts: The patient has been evaluated by a stroke team provider and the therapy needs have been fully considered based off the presenting complaints and exam findings. The following therapy evaluations are needed: PT evaluate and treat, OT evaluate and treat, SLP evaluate and treat    Diagnostics ordered/pending: Carotid ultrasound to assess vasculature, CT scan of head without contrast to asses brain parenchyma, CTA Head to assess vasculature , HgbA1C to assess blood glucose levels, Lipid Profile to assess cholesterol levels, MRA head to assess vasculature, MRA neck/arch to assess vasculature, MRI head without contrast to assess brain parenchyma, TTE to assess cardiac function/status , TSH to assess thyroid function    MRA brain on 01/29  Areas of acute infarction right MCA distribution largest involving the right perisylvian and lateral right frontal regions measuring 4.9 x 2.9 cm and smaller lateral right parietooccipital cortical infarct measuring 2.6 x 2.1 cm with associated localized mass effect including effacement of adjacent portions right sylvian fissure and overlying cortical sulci.     Nonacute posterior left frontal subcortical white matter infarct with small focus overlying cortical involvement.  Moderate presumed microvascular ischemic change white matter.    CT head on 02/03  Evolving right  MCA vascular territory infarcts    MRI brain on 2/4  Stable bilateral acute/subacute supratentorial infarcts without significant extension or hemorrhagic conversion.     Background of prominent periventricular white matter changes of chronic microvascular ischemia.    EEG    This is an abnormal EEG during stupor state.  The overall degree of disorganization and slowing for given age is suggestive of a moderate to severe encephalopathy, likely a toxic metabolic encephalopathy.  There is no evidence of an epileptic process on this recording.  No seizures were recorded during this study.   VTE prophylaxis: None: Reason for No Pharmacological VTE Prophylaxis:  Due to large stroke per Neurology recommendation had [facility] patient to be started after 7 days (2/4    BP parameters: Infarct:  Out of the window at this moment to maintain blood pressure within normal values

## 2025-02-06 NOTE — NURSING
Transferred patient to room 456 Telemetry, handoff given to COLIN oh granddaughter at the bedside. Safety maintained

## 2025-02-06 NOTE — PT/OT/SLP DISCHARGE
Physical Therapy Discharge Summary    Name: Eneida Majano  MRN: [id number]   Principal Problem: Cerebrovascular accident (CVA) due to embolism of right middle cerebral artery     Patient Discharged from acute Physical Therapy on 2/6/25.       Assessment:     Attempted Evaluation 2/5 and 2/6 2025; Per discussion with nursing and MD: pt not following commands/not appropriate for PT evaluation/treatment at this time. Appreciate re-consult if medical status/pt's ability to participate in PT session improves.     Objective:     GOALS:   Multidisciplinary Problems       Physical Therapy Goals       Not on file                    Reasons for Discontinuation of Therapy Services  D/c orders per MD due to current medical status/limited command following.       Plan:     Patient Discharged to: (pending medical status)-per MD/case management      2/6/2025

## 2025-02-06 NOTE — PLAN OF CARE
VN note:   Patient transferred from ICU with all admission questions complete. Patient's chart, labs and vital signs reviewed, will be available to intervene if needed.

## 2025-02-06 NOTE — PROGRESS NOTES
"Progress Note  [facility] Pulmonary & Critical Care Medicine    Attending: Dr. Sanford  Admit Date: 2/3/2025  Today's Date: 02/06/2025    SUBJECTIVE:     Patient seen and examined this morning. Off of the esmolol gtt since yesterday.  Rate has been controlled on oral meds. Pt continues to be somnolent on exam. Not tracking with eyes this morning.     OBJECTIVE:     Vital Signs Trends/Hx Reviewed  Vitals:    02/06/25 0600 02/06/25 0601 02/06/25 0700 02/06/25 0800   BP: (!) 158/90 (!) 158/90 (!) 168/88 (!) 183/130   BP Location:   Right forearm    Patient Position:   Lying    Pulse: 98  91 100   Resp: (!) 25  (!) 21 (!) 29   Temp:    98.4 °F (36.9 °C)   TempSrc:    Oral   SpO2: 97%  97% 97%   Weight:       Height:         Physical Examination:  Physical Exam  Constitutional:       Comments: Not alert or oriented.   HENT:      Head: Normocephalic and atraumatic.   Eyes:      Pupils: Pupils are equal, round, and reactive to light.   Cardiovascular:      Rate and Rhythm: Normal rate. Rhythm irregular.      Pulses: Normal pulses.      Heart sounds: Normal heart sounds.   Pulmonary:      Effort: Pulmonary effort is normal.      Comments: Minimal bibasilar crackles  Abdominal:      General: Abdomen is flat.      Palpations: Abdomen is soft.   Musculoskeletal:      Cervical back: Neck supple.   Skin:     General: Skin is warm and dry.   Neurological:      Mental Status: She is disoriented.      Comments: Unable to assess 2/2 to mental status; Minimal response to painful stimulation; tracking with eyes; unable to move right upper extremity    Laboratory:  Recent Labs   Lab 02/06/25  0422   WBC 9.30   RBC 4.21   HGB 12.1   HCT 38.6      MCV 92   MCH 28.7   MCHC 31.3*       Recent Labs   Lab 02/06/25  0422      K 4.3      CO2 21*   BUN 23   CREATININE 0.9   CALCIUM 8.9   MG 1.9     No results for input(s): "PH", "PCO2", "PO2", "HCO3", "POCSATURATED", "BE" in the last 24 hours.        Chest Imaging: "   Reviewed    ASSESSMENT & RECOMMENDATIONS   Eneida Majano is a 84 y.o. female with a PMHx of atrial fibrillation (on eliquis), RA, T2DM, GERD, HTN, HLD, and recent R MCA acute ischemic stroke with occlusion of right M2 branch who presented to [facility] on 2/3/25 as a transfer from OSH for atrial fibrillation with RVR. Upon presentation to [facility], patient encephalopathic with minimal response to painful stimuli. Protecting airway and saturating well on 2L NC with episodes of apnea. Pt initially in afib with RVR to 140s and normotensive, however, later became hypotensive with MAPS in the 50s. Currently off esmolol gtt. Continue digoxin and metoprolol for rate control; cardiology following.     Neuro/Psych  #Right MCA Stroke   #Acute Encephalopathy  - patient presents with R MCA stroke seen on MRI at OSH with occlusion of right M2 branch; patient not a candidate for tPA due to last known normal >4.5 hours.   - initial deficits were dysphagia, left sided weakness and confusion  - AIS likely cardioembolic in origin due to eliquis non-adherence per chart review  Plan  - Neurology consulted; appreciate recommendations  -continue high intensity statin  - OK to start eliquis 2-14 days after stroke   - consider more permanent intervention for atrial fibrillation   -repeat CT head- evolving right MCA vascular territory infarct without evidence of hemorrhage   - EEG ordered per neuro- abnormal EEG- no seizure activity seen  - 2/4/25- repeat MRI recommended by neuro due to new focal neuro deficit- stable bilateral acute/ subacute supratentorial infarcts without significant extension or hemorrhagic conversion  - Continue GOC discussions with family    CV  #Atrial Fibrillation with RVR  #HFrEF with EF 28%  - patient in afib with RVR to the 140s-150s with normal BP initially; but have achieved rate control  - s/p esmolol gtt; weaned off  Plan  - lopressor 50 mg q6 oral; NG tube placed for administration  - dig level < 0.1; continue  digoxin per cardiology  - continue atorvastatin 80 mg  - keep K >4, Mg >2    Pulm  #Acute Hypoxic Respiratory Failure  - CXR without focal consolidation- no concern for PNA at this time; WBC wnl  - weaned off from 2L to RA; will continue to monitor but currently protecting airway    FEN/GI  - started tube feeds; monitor lytes for refeeding syndrome  - replete lytes prn  - bowel regimen with bisacodyl suppository, senna  Plan  - GI consulted for PEG tube placement      RENAL  #SUSY (resolved)  - Cr 1.4 increased from 1.1 yesterday  - likely pre-renal etiology  -resolved     Heme  - no acute concerns      Endo  - SSI + Accuchecks  - Goal glucose 140-180 while in ICU    ID  #UTI  - no acute concerns for infectious etiology as patient has been afebrile and without lekocytosis  - blood cultures negative   - UA positive for bacteria, wbc, rbc, nitrite negative; agree to treat given patient's mental status  Plan  -agree rocephin 2 g/ day  - follow up urine cultures    MSK  -#RA  Plan  - continue hydroxychloroquine 200 mg     ICU Checklist  Feeding: tube feeds  Analgesia: None  Sedation: None  Thrombo PPX: Lovenox  Head of Bed: > 30 degrees  Ulcer PPX: famotidine  Glucose: goal 140-180s, hypoglycemic ppx  SAT/SBT: NA  Bowel Regimen: senna, bisacodyl  Indwelling Lines: PIV  Abx: none  Family Discussions: Family at bedside; informed of patient's clinical condition and prognosis   Code Status: Full  Dispo: Step down to the floor     Code Status: Full    Lowell Serna DO  U Medicine PGY1     Pt seen with Dr. Sanford. Attending attestation to follow.

## 2025-02-06 NOTE — PT/OT/SLP PROGRESS
Occupational Therapy   Treatment    Name: Eneida Majano  MRN: [id number]  Admitting Diagnosis:  Cerebrovascular accident (CVA) due to embolism of right middle cerebral artery     Pre-op Diagnosis: Acute encephalopathy [G93.40] s/p Procedure(s):  EGD, WITH PEG TUBE INSERTION     Recommendations:     Discharge Recommendations: High Intensity Therapy  Discharge Equipment Recommendations:  to be determined by next level of care  Barriers to discharge:     Increased caregiver burden, Poor SWETA, DEP care.  Assessment:     Eneida Majano is a 84 y.o. female with a medical diagnosis of Cerebrovascular accident (CVA) due to embolism of right middle cerebral artery.  She presents with Poor SWETA. Pt not opening eyes or following commands despite max stimulation. Some UE spontaneous movement noted RUE > LUE. Mild spastic tone in L biceps noted. PROM wfl in BUE. Continue with OT POC. Performance deficits affecting function are weakness, impaired endurance, impaired self care skills, impaired functional mobility, gait instability, impaired balance, decreased ROM, abnormal tone, decreased safety awareness, decreased lower extremity function, decreased coordination, impaired cognition, impaired coordination, impaired fine motor, impaired cardiopulmonary response to activity.     Rehab Prognosis:  Fair; patient would benefit from acute skilled OT services to address these deficits and reach maximum level of function.       Plan:     Patient to be seen 5 x/week to address the above listed problems via self-care/home management, therapeutic activities, therapeutic exercises  Plan of Care Expires:    Plan of Care Reviewed with: patient, grandchild(reyna)    Subjective     Chief Complaint: none-pt not alert.  Patient/Family Comments/goals: pt snoring.  Pain/Comfort:  Pain Rating 1: 0/10  Pain Rating Post-Intervention 1: 0/10none    Objective:     Communicated with: nurse prior to session.  Patient found left sidelying with telemetry, NG  tube, blood pressure cuff, peripheral IV, pulse ox (continuous), PureWick, oxygen upon OT entry to room.    General Precautions: Standard, fall, aspiration, NPO, aphasia    Orthopedic Precautions:N/A  Braces: N/A  Respiratory Status: Room air     [facility] 6 Click ADL: 6    Treatment & Education:  Purpose of OT and POC explained to granddaughter who was at bedside.   Pt not awake or alert despite max stimulation. Pt was snoring in her sleep vs comatose.  Max coma stimulation provided: auditory, tactile, prop. kinesthesia, Joint compression. Pt reacting w/ some irritability- increased spontaneous /non purposeful movement of RUE>LUE. Still snoring. No following of commands and not opening eyes.    Moderate/minimal edema in R distal arm and hands, likely from BP cuff. Retrograde massage to RUE performed. No sign. improvement.   PROM ex performed BUE  10 x 1 set all major planes /joints without resistance; mild spasticity L biceps.   OT manually opened pt eyelids, no startle reflex noted, pt staring straight ahead. No tracking.  Granddaughter educated in above and instructed to talk w/ pt. Massage arms and move arms as able.   Head rotated top l side in bed. Slight Stiffness noted L neck muscles when head rotated to midline. L neck massage and gentle prolong stretching ex performed.   All questions/concerns addressed within scope.   Patient left HOB elevated with all lines intact, call button in reach, bed alarm on, and granddaughter present    GOALS:   Multidisciplinary Problems       Occupational Therapy Goals          Problem: Occupational Therapy    Goal Priority Disciplines Outcome Interventions   Occupational Therapy Goal     OT, PT/OT Not Progressing    Description: Goals to be met by: 03/04/2025      Patient will increase functional independence with ADLs by performing:    UE Dressing with Moderate Assistance.  LE Dressing with Maximum Assistance.  Grooming while seated with Moderate Assistance.  Toileting from  bedside commode with Maximum Assistance for hygiene and clothing management.   Toilet transfer to bedside commode with Moderate Assistance.  Increased functional strength to WFL for self care.  Upper extremity exercise program x10 reps per handout, with assistance as needed.                           Time Tracking:     OT Date of Treatment: 02/06/25  OT Start Time: 1206  OT Stop Time: 1218  OT Total Time (min): 12 min    Billable Minutes:Therapeutic Exercise 12  Total Time 12    OT/OSVALDO: OT     Number of OSVALDO visits since last OT visit: 0    2/6/2025

## 2025-02-06 NOTE — CONSULTS
[facility]  Consult Note    Date of Service: 2/6/2025  Patient seen at the request of: Marco Fiore MD    Reason for Consultation  Stroke    Assessment:  Eneida Majano is a 84 y.o. female who presents with stroke in the setting of atrial fibrillation.    Patient's right M2 occulusion stroke is suggestive of an embolic etiology, likely cardioembolic given the patient's history.  TTE (bubble) - EF 28%, irregularly irregular rhythm.  Patient was prescribed Eliquis for atrial fibrillation, however, there is some concern for non-compliance.  Ok to restart DOAC 2-14 days after stroke, now restarted.  Onset of symptoms was 1/28/2025.      Exam was significant for absence of withdrawal to noxious stimulation of the distal right extremity.  Repeat MRI brain is stable.  Sensory deficit on the right may be secondary to nonacute posterior left frontal subcortical white matter infarct with small focus overlying cortical involvement.      EEG - This is an abnormal EEG during stupor state.  Diffuse disorganized low-amplitude slowing of the background was noted   No evidence of non-convulsive status epilepticus.    She is dysarthric and not following commands, with persistent somnolence.  No leukocytosis.  Swelling appears relatively minimal on CT head from 1/30.  Repeat CTH stable. Consider hypoactive delirium.  Consider effect of infarct burden.    UA was cloudy, Cx pending.  Empiric abx started per primary.  May represent etiology of AMS.      Plan:    - delirium precautions (minimize disruptions during sleeping hours, reorient frequently)  - repeat brain imaging if patient's mental status worsens/new focal deficit      A dictation device was used to produce this document. Use of such devices sometimes results in grammatical errors or replacement of words that sound similarly.     Signed:    Bakari Martin MD  Neurology/Epilepsy  02/06/2025 2:51 PM      Interval Events:  - 2/6: patient persistently altered,  needed haldol overnight for pulling at lines; UA cloudy w/ protein, Cx pending, empiric abx started        HPI:  Eneida Majano is a 84 y.o. female with   Past Medical History:   Diagnosis Date    Acid reflux     Anemia     Arthritis     Atrial fibrillation     Carpal tunnel syndrome     Cataract     Dry mouth     GERD (gastroesophageal reflux disease)     Hyperlipidemia     Hypertension     Insomnia     Other osteoporosis without current pathological fracture 3/14/2018    PONV (postoperative nausea and vomiting)     short term     Patient is unable to provide any additional history.  She does not follow commands and was taken for repeat CT head.      This is the extent of the patient's complaints at this time.    From discharge/transfer summary from transferring hospital 2/3/2025):  84-year-old female with a past medical history of anemia, atrial fibrillation/flutter on diltiazem and Eliquis at home, GERD, dyslipidemia, hypertension, and insomnia.     She now comes as a transfer from [facility] ED due to stroke like symptoms that started one day prior to presentation at 9 PM on 1/28/25.  Her symptoms were dysphagia, left-sided weakness, and right lateral gaze. Upon presentation to the ED, she was still symptomatic.  The MRI machine at [facility] is broken, so she is being transferred to [facility] for MRI capability.  She had a completed tele-stroke consult done in the ED (please refer to Dr. Bhtat's note).     The patient's family are at the bedside and provide all of the history, as Ms. Majano is very lethargic and cannot talk to me or cooperate with exam.  Even with the nurse shaking her, she just stares ahead without interacting or speaking.  The family tells me that yesterday around 3pm, she would not answer her grandson, who found her door locked as well.  They broke the door down and found her lying on the ground incontinent of urine.  She was able to ask for a cup of water, but not get up.  He picked her up  and carried her to the chair and called 911.  She also would not speak at that time as well.    Hospital Course:   Patient admitted to telemetry floor and started on neurovascular protective medications and evaluated by Neurology.  Holding on anticoagulation at this time due to concerns for possible risk of hemorrhagic conversion and bleed.  Repeat CTA of the head recommended by Neurology to evaluate for any bleed.  Patient lethargic and poorly responsive when seen by medical team but by afternoon, PT/OT was working with patient and recommendations for high intensity therapy appreciated.  Case management consulted for assistance in placement.     1/31:  Patient's mental status greatly improved but still having significant lower extremity weakness.  Working with PT/OT and recommendations for tight intensity therapy appreciated.  CTA of the head shows no signs of acute bleed or hemorrhagic conversion.  Will plan on resuming anticoagulation on day 7 as per neurology recommendations.  Will start patient on Plavix and hold ASA for now.  Continue atorvastatin and transition from IV to p.o. metoprolol.  Awaiting SNF/rehab placement.     2/3:  Patient went into AFib with RVR overnight.  Echocardiogram shows reduced ejection fraction of 26%.  Cardiology recommended initiation of esmolol GTT and patient transferred to ICU for further evaluation and management of AFib with RVR.  Patient remains tachycardic despite maximum as well dose, blood pressure is within normal limits at this time.  Patient's mental status unchanged and remains severely confused, responds poorly to questions and commands, and still has significant focal neurological deficits.  After further discussion with Cardiology, recommendation to transfer to higher level care were Cardiology will be available to assist in management.  Patient breathing comfortably on 2 L O2 via nasal cannula.  Patient is stable for transfer via EMS to [facility] for higher level of  care and access to Interventional Cardiology.       TSH   Date Value Ref Range Status   02/03/2025 3.156 0.400 - 4.000 uIU/mL Final   01/28/2025 1.451 0.400 - 4.000 uIU/mL Final     Hemoglobin A1C   Date Value Ref Range Status   01/29/2025 5.7 (H) 4.0 - 5.6 % Final     Comment:     ADA Screening Guidelines:  5.7-6.4%  Consistent with prediabetes  >or=6.5%  Consistent with diabetes    High levels of fetal hemoglobin interfere with the HbA1C  assay. Heterozygous hemoglobin variants (HbS, HgC, etc)do  not significantly interfere with this assay.   However, presence of multiple variants may affect accuracy.     10/01/2024 5.8 (H) 4.0 - 5.6 % Final     Comment:     ADA Screening Guidelines:  5.7-6.4%  Consistent with prediabetes  >or=6.5%  Consistent with diabetes    High levels of fetal hemoglobin interfere with the HbA1C  assay. Heterozygous hemoglobin variants (HbS, HgC, etc)do  not significantly interfere with this assay.   However, presence of multiple variants may affect accuracy.           Review of Systems:  ROS negative unless noted in HPI    Past Surgical History:  Past Surgical History:   Procedure Laterality Date    APPENDECTOMY      CARDIOVERSION N/A 2/8/2019    Procedure: Cardioversion;  Surgeon: Emmanuel Matthew MD;  Location: [facility] CATH LAB;  Service: Cardiology;  Laterality: N/A;    COLONOSCOPY      COLONOSCOPY N/A 5/28/2020    Procedure: COLONOSCOPY;  Surgeon: Gopi Brooke MD;  Location: [facility];  Service: Endoscopy;  Laterality: N/A;    COLONOSCOPY N/A 7/7/2020    Procedure: COLONOSCOPY;  Surgeon: Gopi Brooke MD;  Location: [facility];  Service: Endoscopy;  Laterality: N/A;    ESOPHAGOGASTRODUODENOSCOPY N/A 5/28/2020    Procedure: EGD (ESOPHAGOGASTRODUODENOSCOPY);  Surgeon: Gopi Brooke MD;  Location: [facility];  Service: Endoscopy;  Laterality: N/A;    FOOT SURGERY Left     HAND SURGERY      HERNIA REPAIR      x2    HYSTERECTOMY  age 32    fibroids    OOPHORECTOMY          Family History:  Family History   Problem Relation Name Age of Onset    Arthritis Father      Cancer Father          liver    Leukemia Mother      Diabetes Sister      Cancer Sister          throat    Heart disease Sister      Heart disease Brother      Asthma Daughter      Colon cancer Neg Hx         Social History:  Social History     Tobacco Use    Smoking status: Never     Passive exposure: Never    Smokeless tobacco: Never   Substance Use Topics    Alcohol use: No     Alcohol/week: 0.0 standard drinks of alcohol    Drug use: No       Allergies:  Asa [aspirin]    Outpatient Medications:  Prior to Admission medications    Medication Sig Start Date End Date Taking? Authorizing Provider   acetaminophen (TYLENOL) 650 MG TbSR Take 1 tablet (650 mg total) by mouth 3 (three) times daily as needed. 3/14/19   Sylwia Barrera NP   albuterol (PROVENTIL/VENTOLIN HFA) 90 mcg/actuation inhaler INHALE 2 PUFFS INTO THE LUNGS EVERY 6 HOURS AS NEEDED WHEEZING RESCUE 5/9/23   Paula Coopre NP   apixaban (ELIQUIS) 2.5 mg Tab Take 1 tablet (2.5 mg total) by mouth 2 (two) times daily. 8/6/24   Paula Cooper NP   aspirin 81 MG Chew Take 1 tablet (81 mg total) by mouth once daily. 3/14/19 7/13/23  Sylwia Barrera NP   baclofen (LIORESAL) 5 mg Tab tablet Take 2 tablets (10 mg total) by mouth after lunch for 3 days, THEN 1 tablet (5 mg total) after lunch for 2 days, THEN 0.5 tablets (2.5 mg total) after lunch for 2 days. 10/29/24 11/5/24  Anthony Liao MD   benzonatate (TESSALON) 200 MG capsule Take 1 capsule (200 mg total) by mouth 3 (three) times daily as needed for Cough. 2/19/23   Andre Rubio NP   blood pressure test kit-large Kit 1 kit by Misc.(Non-Drug; Combo Route) route 2 (two) times daily as needed. 3/14/19   Sylwia Barrera NP   blood sugar diagnostic (TRUE METRIX GLUCOSE TEST STRIP) Strp Check BS once daily 11/15/24   Paula Cooper NP   blood-glucose meter (TRUE METRIX GLUCOSE METER) kit Use  "to check BS once daily 11/15/24 11/15/25  Paula Cooper NP   diltiaZEM (CARDIZEM CD) 180 MG 24 hr capsule Take 1 capsule (180 mg total) by mouth once daily. 1/28/25 1/28/26  Shadi Irwin MD   fluticasone propionate (FLONASE) 50 mcg/actuation nasal spray 1 spray to each nostril twice daily for 7 days, then may continue 1 spray to each nostril ONCE daily as needed for sinus congestion. 2/19/23   Andre Rubio NP   furosemide (LASIX) 20 MG tablet Take 1 tablet (20 mg total) by mouth daily as needed (edema). 10/3/24 10/3/25  Paula Cooper NP   hydroxychloroquine (PLAQUENIL) 200 mg tablet Take 1 tablet (200 mg total) by mouth 2 (two) times daily. 10/3/19   Billy Mahan MD   lancets (LANCETS,THIN) Misc Check BS once daily 11/15/24   Paula Cooper NP   lisinopriL 10 MG tablet Take 1 tablet (10 mg total) by mouth every evening. for 120 doses 10/29/24 2/26/25  Anthony Liao MD   metoprolol succinate (TOPROL-XL) 50 MG 24 hr tablet Take 1 tablet (50 mg total) by mouth 2 (two) times daily. 10/29/24 10/29/25  Anthony Liao MD   nystatin (MYCOSTATIN) cream Apply topically 2 (two) times daily. 10/3/24   Paula Cooper NP   nystatin (MYCOSTATIN) powder Apply topically 4 (four) times daily. 1/7/25   Paula Cooper NP   polyethylene glycol (GLYCOLAX) 17 gram/dose powder Take 17 g by mouth once daily. 2/3/22   Leona Boyer MD   triamcinolone acetonide 0.1% (KENALOG) 0.1 % cream Apply topically 2 (two) times daily. for 7 days 5/7/21 5/14/21  Paula Cooper NP       Physical exam:    Vitals: BP (!) 150/67   Pulse 96   Temp 99.2 °F (37.3 °C) (Oral)   Resp (!) 23   Ht 4' 11" (1.499 m)   Wt 51.8 kg (114 lb 3.2 oz)   SpO2 98%   Breastfeeding No   BMI 23.07 kg/m²     General:   Somnolent  Head/Neck:   Normocephalic,atraumatic  Pulm:  Non-labored breathing     Mental Status:  Severe dysarthria, does not follow one step commands  CN:  II: pupils equal and reactive to light  III, IV, VI: unable to " completely assess  VII: Facial movement full and symmetric.   VIII: Responds to loud voice  IX, X: does not open mouth to command  Motor: Normal bulk throughout all four extremities.   LUE: does not participate with exam, spontaneous movement appreciated  RUE:  does not participate with exam, spontaneous movement appreciated  LLE:  does not participate with exam, withdraws  RLE: does not participate with exam, withdraws  No tremors   Sensory: Withdrawing to touch x4 extremities  Reflexes: LUE: Biceps 2+ brachioradialis 2+  RUE: Biceps 2+, brachioradialis 2+  LLE: Knee 2+  RLE: Knee 2+  Coordination:  Unable to assess  Gait: bed bound    Imaging:  All pertinent imaging was personally reviewed.    On my personal review:   MRI shows right MCA distribution infarct (1/29), more recent CT (1/30) does not show significant unilateral swelling.      Results for orders placed during the hospital encounter of 01/28/25    MRI Brain Without Contrast    Narrative  EXAMINATION:  MRI BRAIN WITHOUT CONTRAST    CLINICAL HISTORY:  Stroke, follow up;    TECHNIQUE:  Brain studied using sagittal and axial T1, axial T2, FLAIR and DW Images.    COMPARISON:  CT head 01/28/2025    FINDINGS:  Areas of acute infarction are evident the largest of which right MCA distribution involving the anterior right perisylvian and lateral right frontal regions measuring approximately 4.9 x 2.9 cm and smaller lateral right parietooccipital cortical infarct measuring approximately 2.6 x 2.1 cm as seen on the prior CT.  Associated localized mass effect again noted including effacement of the adjacent portions ipsilateral right sylvian fissure and overlying cortical sulci.    Small possibly subacute or chronic posterior left frontal subcortical infarct measuring approximately 1.5 cm with small focus involvement of the overlying cortex noted.  Additional nonspecific T2 hyperintensities white matter may reflect moderate microvascular ischemic change.  Old  lacunar infarcts and or incidental prominent perivascular spaces bilateral basal ganglia and thalami.  Presumed microvascular ischemic change central ange.    Visualized paranasal sinuses are clear as are mastoid air cells.    Impression  Areas of acute infarction right MCA distribution largest involving the right perisylvian and lateral right frontal regions measuring 4.9 x 2.9 cm and smaller lateral right parietooccipital cortical infarct measuring 2.6 x 2.1 cm with associated localized mass effect including effacement of adjacent portions right sylvian fissure and overlying cortical sulci.    Nonacute posterior left frontal subcortical white matter infarct with small focus overlying cortical involvement.    Moderate presumed microvascular ischemic change white matter.      Electronically signed by: Saige Garcia MD  Date:    01/29/2025  Time:    09:03

## 2025-02-06 NOTE — PROGRESS NOTES
EICU FOLLOWUP NOTE:    Excessive oral secretions, getting suctioned frequently.  Scopolamine patch ordered    Zion Tijerina MD  [facility]  [phone]

## 2025-02-06 NOTE — SUBJECTIVE & OBJECTIVE
Interval History: lying in bed, unresponsive, weaned off esmolol drip to Metoprolol.     Granddaughter at bedside reports minimal agitation overnight.   Reports unable to control secretion - scopolamine in placement     Appreciates SLP rec's -remain NPO- appreciates GI plan PEG tube placement on 2/7  Step down to floor      Review of Systems   Unable to perform ROS: Patient unresponsive     Objective:     Vital Signs (Most Recent):  Temp: 98.4 °F (36.9 °C) (02/06/25 0800)  Pulse: (!) 117 (02/06/25 0900)  Resp: (!) 25 (02/06/25 0900)  BP: (!) 157/97 (02/06/25 0900)  SpO2: 96 % (02/06/25 0900) Vital Signs (24h Range):  Temp:  [98.1 °F (36.7 °C)-98.9 °F (37.2 °C)] 98.4 °F (36.9 °C)  Pulse:  [] 117  Resp:  [15-42] 25  SpO2:  [95 %-98 %] 96 %  BP: (119-183)/() 157/97     Weight: 51.8 kg (114 lb 3.2 oz)  Body mass index is 23.07 kg/m².    Intake/Output Summary (Last 24 hours) at 2/6/2025 0919  Last data filed at 2/6/2025 0901  Gross per 24 hour   Intake 453.99 ml   Output 750 ml   Net -296.01 ml         Physical Exam  Constitutional:       General: She is not in acute distress.     Appearance: She is ill-appearing. She is not toxic-appearing.      Comments: Very cachectic   HENT:      Head: Normocephalic and atraumatic.   Cardiovascular:      Rate and Rhythm: Normal rate. Rhythm irregular.   Pulmonary:      Effort: Pulmonary effort is normal.      Breath sounds: Normal breath sounds.   Abdominal:      General: Abdomen is flat.      Palpations: Abdomen is soft.   Musculoskeletal:         General: No swelling or tenderness.      Cervical back: Normal range of motion.      Right lower leg: No edema.      Left lower leg: No edema.   Skin:     General: Skin is dry.      Capillary Refill: Capillary refill takes less than 2 seconds.      Coloration: Skin is pale.   Neurological:      Mental Status: She is disoriented.             Significant Labs: A1C:   Recent Labs   Lab 10/01/24  0816 01/29/25  2300   HGBA1C 5.8*  "5.7*     ABGs:   No results for input(s): "PH", "PCO2", "HCO3", "POCSATURATED", "BE", "TOTALHB", "COHB", "METHB", "O2HB", "POCFIO2", "PO2" in the last 48 hours.    Blood Culture:   No results for input(s): "LABBLOO" in the last 48 hours.    CBC:   Recent Labs   Lab 02/06/25  0422   WBC 9.30   HGB 12.1   HCT 38.6        CMP:   Recent Labs   Lab 02/05/25  0542 02/06/25  0422    137   K 4.8 4.3    108   CO2 17* 21*   GLU 96 143*   BUN 29* 23   CREATININE 1.2 0.9   CALCIUM 8.5* 8.9   ALBUMIN  --  2.7*   ANIONGAP 11 8     Cardiac Markers:   No results for input(s): "CKMB", "MYOGLOBIN", "BNP", "TROPISTAT" in the last 48 hours.    Lactic Acid:   No results for input(s): "LACTATE" in the last 48 hours.    Lipase:   No results for input(s): "LIPASE" in the last 48 hours.    Lipid Panel: No results for input(s): "CHOL", "HDL", "LDLCALC", "TRIG", "CHOLHDL" in the last 48 hours.  Magnesium:   Recent Labs   Lab 02/05/25  0542 02/06/25  0422   MG 1.9 1.9     Troponin:   No results for input(s): "TROPONINI", "TROPONINIHS" in the last 48 hours.    TSH:   Recent Labs   Lab 02/03/25  1353   TSH 3.156     Urine Culture: No results for input(s): "LABURIN" in the last 48 hours.  Urine Studies:   Recent Labs   Lab 02/05/25  1331   COLORU Yellow   APPEARANCEUA Cloudy*   PHUR >8.0*   SPECGRAV >1.030*   PROTEINUA 3+*   GLUCUA Negative   KETONESU Trace*   BILIRUBINUA Negative   OCCULTUA 2+*   NITRITE Negative   UROBILINOGEN 2.0-3.0*   LEUKOCYTESUR 3+*   RBCUA 40*   WBCUA >100*   BACTERIA Moderate*   HYALINECASTS 0       Significant Imaging: I have reviewed all pertinent imaging results/findings within the past 24 hours.  "

## 2025-02-06 NOTE — PLAN OF CARE
Problem: Occupational Therapy  Goal: Occupational Therapy Goal  Description: Goals to be met by: 03/04/2025      Patient will increase functional independence with ADLs by performing:    UE Dressing with Moderate Assistance.  LE Dressing with Maximum Assistance.  Grooming while seated with Moderate Assistance.  Toileting from bedside commode with Maximum Assistance for hygiene and clothing management.   Toilet transfer to bedside commode with Moderate Assistance.  Increased functional strength to WFL for self care.  Upper extremity exercise program x10 reps per handout, with assistance as needed.      Outcome: Not Progressing   Poor SWETA. Pt not opening eyes or following commands despite max stimulation. Some UE spontaneous movement noted RUE >LUE. Mild spastic tone in L biceps noted. PROM wfl in BUE. Continue with OT POC.

## 2025-02-06 NOTE — PLAN OF CARE
Problem: Adult Inpatient Plan of Care  Goal: Plan of Care Review  2/6/2025 0537 by Nunu Stoddard RN  Outcome: Not Progressing  2/6/2025 0537 by Nunu Stoddard RN  Outcome: Not Progressing  Goal: Patient-Specific Goal (Individualized)  2/6/2025 0537 by Nunu Stoddard RN  Outcome: Not Progressing  2/6/2025 0537 by Nunu Stoddard RN  Outcome: Not Progressing  Goal: Absence of Hospital-Acquired Illness or Injury  2/6/2025 0537 by Nunu tSoddard RN  Outcome: Not Progressing  2/6/2025 0537 by Nunu Stoddard RN  Outcome: Not Progressing  Goal: Optimal Comfort and Wellbeing  2/6/2025 0537 by Nunu Stoddard RN  Outcome: Not Progressing  2/6/2025 0537 by Nunu Stoddard RN  Outcome: Not Progressing  Goal: Readiness for Transition of Care  2/6/2025 0537 by Nunu Stoddard RN  Outcome: Progressing  2/6/2025 0537 by Nunu Stoddard RN  Outcome: Not Progressing     Problem: Diabetes Comorbidity  Goal: Blood Glucose Level Within Targeted Range  2/6/2025 0537 by Nunu Stoddard RN  Outcome: Progressing  2/6/2025 0537 by Nunu Stoddard RN  Outcome: Not Progressing     Problem: Wound  Goal: Optimal Coping  2/6/2025 0537 by Nunu Stoddard RN  Outcome: Not Progressing  2/6/2025 0537 by Nunu Stoddard RN  Outcome: Not Progressing  Goal: Optimal Functional Ability  2/6/2025 0537 by Nunu Stoddard RN  Outcome: Not Progressing  2/6/2025 0537 by Nunu Stoddard RN  Outcome: Not Progressing  Goal: Absence of Infection Signs and Symptoms  2/6/2025 0537 by Nunu Stoddard RN  Outcome: Not Progressing  2/6/2025 0537 by Nunu Stoddard RN  Outcome: Not Progressing  Goal: Improved Oral Intake  2/6/2025 0537 by Nunu Stoddard RN  Outcome: Not Progressing  2/6/2025 0537 by Nunu Stoddard, RN  Outcome: Not Progressing  Goal: Optimal Pain Control and Function  2/6/2025 0537 by Nunu Stoddard, RN  Outcome: Not Progressing  2/6/2025 0537 by Nunu Stoddard, RN  Outcome: Not Progressing  Goal: Skin Health and  Integrity  2/6/2025 0537 by Nunu Stoddard RN  Outcome: Not Progressing  2/6/2025 0537 by Nunu Stoddard RN  Outcome: Not Progressing  Goal: Optimal Wound Healing  2/6/2025 0537 by Nunu Stoddard RN  Outcome: Not Progressing  2/6/2025 0537 by Nunu Stoddard RN  Outcome: Not Progressing     Problem: Fall Injury Risk  Goal: Absence of Fall and Fall-Related Injury  2/6/2025 0537 by Nunu Stoddard RN  Outcome: Not Progressing  2/6/2025 0537 by Nunu Stoddard RN  Outcome: Not Progressing     Problem: Skin Injury Risk Increased  Goal: Skin Health and Integrity  2/6/2025 0537 by Nunu Stoddard RN  Outcome: Not Progressing  2/6/2025 0537 by Nunu Stoddard RN  Outcome: Not Progressing     Problem: Infection  Goal: Absence of Infection Signs and Symptoms  2/6/2025 0537 by Nunu Stoddard RN  Outcome: Not Progressing  2/6/2025 0537 by Nunu Stoddard RN  Outcome: Not Progressing     Problem: Restraint, Nonviolent  Goal: Absence of Harm or Injury  2/6/2025 0537 by Nunu Stoddard RN  Outcome: Met  2/6/2025 0537 by Nunu Stoddard RN  Outcome: Not Progressing

## 2025-02-06 NOTE — ASSESSMENT & PLAN NOTE
2/3 at [facility] sent for stat CT head  -TSH T4 ammonia levels  -a neurology consulted-appreciate recs    Repeat MRI brain  unchanged   EEG   This is an abnormal EEG during stupor state.  The overall degree of disorganization and slowing for given age is suggestive of a moderate to severe encephalopathy, likely a toxic metabolic encephalopathy.  There is no evidence of an epileptic process on this recording.  No seizures were recorded during this study.   Consult GI for PEG tube placement- planned for 2/7

## 2025-02-07 ENCOUNTER — ANESTHESIA (OUTPATIENT)
Dept: ENDOSCOPY | Facility: HOSPITAL | Age: 85
End: 2025-02-07
Payer: MEDICARE

## 2025-02-07 LAB
ANION GAP SERPL CALC-SCNC: 8 MMOL/L (ref 8–16)
BUN SERPL-MCNC: 18 MG/DL (ref 8–23)
CALCIUM SERPL-MCNC: 8.7 MG/DL (ref 8.7–10.5)
CHLORIDE SERPL-SCNC: 106 MMOL/L (ref 95–110)
CO2 SERPL-SCNC: 22 MMOL/L (ref 23–29)
CREAT SERPL-MCNC: 0.8 MG/DL (ref 0.5–1.4)
EST. GFR  (NO RACE VARIABLE): >60 ML/MIN/1.73 M^2
GLUCOSE SERPL-MCNC: 107 MG/DL (ref 70–110)
PHOSPHATE SERPL-MCNC: 1.8 MG/DL (ref 2.7–4.5)
POCT GLUCOSE: 113 MG/DL (ref 70–110)
POCT GLUCOSE: 117 MG/DL (ref 70–110)
POCT GLUCOSE: 60 MG/DL (ref 70–110)
POCT GLUCOSE: 79 MG/DL (ref 70–110)
POTASSIUM SERPL-SCNC: 4 MMOL/L (ref 3.5–5.1)
SODIUM SERPL-SCNC: 136 MMOL/L (ref 136–145)

## 2025-02-07 PROCEDURE — 25000003 PHARM REV CODE 250: Performed by: INTERNAL MEDICINE

## 2025-02-07 PROCEDURE — 0DB78ZX EXCISION OF STOMACH, PYLORUS, VIA NATURAL OR ARTIFICIAL OPENING ENDOSCOPIC, DIAGNOSTIC: ICD-10-PCS | Performed by: INTERNAL MEDICINE

## 2025-02-07 PROCEDURE — 88305 TISSUE EXAM BY PATHOLOGIST: CPT | Performed by: PATHOLOGY

## 2025-02-07 PROCEDURE — 88342 IMHCHEM/IMCYTCHM 1ST ANTB: CPT | Performed by: PATHOLOGY

## 2025-02-07 PROCEDURE — 37000008 HC ANESTHESIA 1ST 15 MINUTES: Performed by: INTERNAL MEDICINE

## 2025-02-07 PROCEDURE — 99900035 HC TECH TIME PER 15 MIN (STAT)

## 2025-02-07 PROCEDURE — 25000003 PHARM REV CODE 250: Performed by: STUDENT IN AN ORGANIZED HEALTH CARE EDUCATION/TRAINING PROGRAM

## 2025-02-07 PROCEDURE — 80048 BASIC METABOLIC PNL TOTAL CA: CPT

## 2025-02-07 PROCEDURE — 36415 COLL VENOUS BLD VENIPUNCTURE: CPT

## 2025-02-07 PROCEDURE — 63600175 PHARM REV CODE 636 W HCPCS: Performed by: FAMILY MEDICINE

## 2025-02-07 PROCEDURE — 63600175 PHARM REV CODE 636 W HCPCS: Performed by: STUDENT IN AN ORGANIZED HEALTH CARE EDUCATION/TRAINING PROGRAM

## 2025-02-07 PROCEDURE — 0DH63UZ INSERTION OF FEEDING DEVICE INTO STOMACH, PERCUTANEOUS APPROACH: ICD-10-PCS | Performed by: INTERNAL MEDICINE

## 2025-02-07 PROCEDURE — 37000009 HC ANESTHESIA EA ADD 15 MINS: Performed by: INTERNAL MEDICINE

## 2025-02-07 PROCEDURE — 43239 EGD BIOPSY SINGLE/MULTIPLE: CPT | Mod: 59 | Performed by: INTERNAL MEDICINE

## 2025-02-07 PROCEDURE — 11000001 HC ACUTE MED/SURG PRIVATE ROOM

## 2025-02-07 PROCEDURE — 84100 ASSAY OF PHOSPHORUS: CPT

## 2025-02-07 PROCEDURE — 27000221 HC OXYGEN, UP TO 24 HOURS

## 2025-02-07 PROCEDURE — 25000003 PHARM REV CODE 250

## 2025-02-07 PROCEDURE — 27201012 HC FORCEPS, HOT/COLD, DISP: Performed by: INTERNAL MEDICINE

## 2025-02-07 PROCEDURE — 27201018 HC KIT, PEG (ANY): Performed by: INTERNAL MEDICINE

## 2025-02-07 PROCEDURE — 94761 N-INVAS EAR/PLS OXIMETRY MLT: CPT

## 2025-02-07 PROCEDURE — 25000003 PHARM REV CODE 250: Performed by: NURSE PRACTITIONER

## 2025-02-07 PROCEDURE — 25000003 PHARM REV CODE 250: Performed by: FAMILY MEDICINE

## 2025-02-07 PROCEDURE — 43246 EGD PLACE GASTROSTOMY TUBE: CPT | Performed by: INTERNAL MEDICINE

## 2025-02-07 RX ORDER — ETOMIDATE 2 MG/ML
INJECTION INTRAVENOUS
Status: DISCONTINUED | OUTPATIENT
Start: 2025-02-07 | End: 2025-02-07

## 2025-02-07 RX ORDER — METOPROLOL TARTRATE 1 MG/ML
10 INJECTION, SOLUTION INTRAVENOUS ONCE
Status: DISCONTINUED | OUTPATIENT
Start: 2025-02-07 | End: 2025-02-12 | Stop reason: HOSPADM

## 2025-02-07 RX ORDER — PROPOFOL 10 MG/ML
VIAL (ML) INTRAVENOUS
Status: DISCONTINUED | OUTPATIENT
Start: 2025-02-07 | End: 2025-02-07

## 2025-02-07 RX ORDER — LIDOCAINE HYDROCHLORIDE 20 MG/ML
INJECTION INTRAVENOUS
Status: DISCONTINUED | OUTPATIENT
Start: 2025-02-07 | End: 2025-02-07

## 2025-02-07 RX ADMIN — DEXTROSE MONOHYDRATE 12.5 G: 25 INJECTION, SOLUTION INTRAVENOUS at 04:02

## 2025-02-07 RX ADMIN — LISINOPRIL 10 MG: 2.5 TABLET ORAL at 10:02

## 2025-02-07 RX ADMIN — DIGOXIN 0.12 MG: 125 TABLET ORAL at 10:02

## 2025-02-07 RX ADMIN — HYDROXYCHLOROQUINE SULFATE 200 MG: 200 TABLET, FILM COATED ORAL at 10:02

## 2025-02-07 RX ADMIN — LIDOCAINE HYDROCHLORIDE 50 MG: 20 INJECTION, SOLUTION INTRAVENOUS at 02:02

## 2025-02-07 RX ADMIN — METOPROLOL TARTRATE 75 MG: 50 TABLET, FILM COATED ORAL at 10:02

## 2025-02-07 RX ADMIN — METOROPROLOL TARTRATE 5 MG: 5 INJECTION, SOLUTION INTRAVENOUS at 07:02

## 2025-02-07 RX ADMIN — CEFTRIAXONE SODIUM 2 G: 2 INJECTION, POWDER, FOR SOLUTION INTRAMUSCULAR; INTRAVENOUS at 10:02

## 2025-02-07 RX ADMIN — PROPOFOL 10 MG: 10 INJECTION, EMULSION INTRAVENOUS at 02:02

## 2025-02-07 RX ADMIN — ETOMIDATE 6 MG: 2 INJECTION, SOLUTION INTRAVENOUS at 02:02

## 2025-02-07 RX ADMIN — ATORVASTATIN CALCIUM 80 MG: 40 TABLET, FILM COATED ORAL at 10:02

## 2025-02-07 RX ADMIN — SODIUM CHLORIDE: 0.9 INJECTION, SOLUTION INTRAVENOUS at 02:02

## 2025-02-07 RX ADMIN — FAMOTIDINE 20 MG: 10 INJECTION, SOLUTION INTRAVENOUS at 10:02

## 2025-02-07 NOTE — PLAN OF CARE
Eneida Majano #[id number] (CSN: [id number]) (84 y.o. F) (Adm: 02/03/25)  [facility] [id number]  PCP    TREVIN GRADY  Date of Birth    1940     Demographics    Address:   [address] [place] [address]    Home Phone:   [phone]    Work Phone:       Mobile Phone:   [phone]              SSN:       Insurance:   [employer] [facility]    Marital Status:       Confucianist:   Worship                Admission Dx    Afib with RVR   I48.91 Atrial fibrillation with RVR   I48.91 Atrial fibrillation with RVR     Chief Complaint    None  Documents Filed to Patient    Power of  Living Will Clinical Unknown Study Attachment Consent Form ABN Waiver After Visit Summary Lab Result Scan Code Status MyChart Status    Not on File  Not on File  Not on File  Not on File  Filed  Not on File  Filed  Not on File  DNR [Updated on 02/05/25 1221]  Active     Admission Information    Current Information    Attending Provider Admitting Provider Admission Type Admission Status   Chhaya Vilchis MD Al Allawi, Farah, MD Urgent Confirmed Admission          Admission Date/Time Discharge Date Hospital Service Auth/Cert Status   02/03/25  1238  Hospital Medicine Incomplete          Hospital Area Unit Room/Bed    \[facility] [place] [facility]\"" TELEMETRY UNIT K456/K456 A              Hospital Account    Name Acct ID Class Status Primary Coverage   Eneida Majano [id number] IP- Inpatient Open [facility] - [employer]            Guarantor Account (for Hospital Account #[id number])    Name Relation to Pt Service Area Active? Acct Type   Eneida Majano Self OHSSA Yes Personal/Family   Address Phone     [address] [place] [address] [phone](H)              Coverage Information (for Hospital Account #[id number])      1. [facility] [employer]    F/O Payor/Plan Precert #   [employer] [facility] HEALTH [employer]  SNP [id number]   Subscriber Subscriber #   Eneida Majano [id number]   Address Phone   [address] [address]  [place] [address] [phone]     2. [place]    F/O Payor/Plan Precert #   [place]    Subscriber Subscriber #   Eneida Majano [id number]   Address Phone   [address] [address]  [facility] [facility] [place] [address] [phone]              Emergency Contact Information    Name: Adelina Majano Relationship: Daughter   Address:     City:  State:  Zip:  Phone:     Business phone:       Inpatient consult to Social Work [[id number]] (Order [id number])  Consult  Date and Time: 2/7/2025 10:27 AM Department: [facility] Telemetry Unit Rel By: Natacha Harrison RN (auto-released) Authorizing: Chhaya Vilchis MD     Order Information    Order Date/Time Release Date/Time Start Date/Time End Date/Time   02/07/25 10:27 AM 02/07/25 10:27 AM 02/07/25 10:27 AM 02/07/25 10:27 AM     Order Details    Frequency Duration Priority Order Class   Once 1  occurrence Routine Hospital Performed     Original Order    Ordered On Ordered By    2/7/2025 10:27 AM Natacha Harrison RN             Comments    Patient is accepted by [facility]. If approved, FAX#[phone]              Inpatient consult to Social Work: Patient Communication     Not Released  Not seen     Order Questions    Question Answer   Reason for Consult LTAC                      Collection Information          Consult Order Info    ID Description Priority Start Date Start Time   [id number] Inpatient consult to Social Work Routine 02/07/2025 10:27 AM   Provider Specialty Referred to   ______________________________________ _____________________________________                       Cosign Order Info    Action Created on Order Mode Entered by Responsible Provider Signed by Signed on   Ordering 02/07/25 1027 Verbal with readback Natacha Harrison RN Innocent-Ituah, Ijeoma N., MD             Patient Information    Patient  Name  Eneida Majano Legal Sex  Female   1940 [facility]         Additional Information    Associated Reports   View Parent Encounter   Priority and Order Details         Order Provider Info        Office phone Pager E-mail   Ordering User Natacha Harrison RN -- -- [email or web address]   Authorizing Provider  Chhaya Vilchis MD  [phone] -- [email or web address]   Attending Provider  Chhaya Vilchis MD  [phone] -- [email or web address]       Inpatient consult to Social Work [[id number]]    Awaiting signature from: Chhaya Vilchis MD Status: Active   Mode: Ordering in Verbal with readback mode Communicated by: Natacha Harrison RN   Ordering user: Natacha Harrison RN 25 1027 Ordering provider: Chhaya Vilchis MD   Authorized by: Chhaya Vilchis MD Ordering mode: Verbal with readback     Questionnaire    Question Answer   Reason for Consult LTAC   Order comments: Patient is accepted by [facility]. If approved, FAX#[phone]

## 2025-02-07 NOTE — NURSING
The patient has returned from having a peg tube placed and is resting in bed at this time. No discomfort noted at this time. Will continue to monitor.

## 2025-02-07 NOTE — PLAN OF CARE
Recommendation:  1. Continue current TF as tolerated.   2. Monitor weight/labs.   3. RD to continue to follow to monitor TF tolerance.    Goals:  TF to meet at least 85% EEN/EPN by RD follow up  Nutrition Goal Status: progressing towards goal

## 2025-02-07 NOTE — ANESTHESIA PREPROCEDURE EVALUATION
02/07/2025  Eneida Majano is a 84 y.o., female.      Pre-op Assessment    I have reviewed the Patient Summary Reports.     I have reviewed the Nursing Notes. I have reviewed the NPO Status.      Review of Systems  Anesthesia Hx:               Denies Personal Hx of Anesthesia complications.                    Cardiovascular:     Hypertension       CHF                Congestive Heart Failure (CHF)                Hypertension     Atrial Fibrillation     Hepatic/GI:     GERD         Gerd          Musculoskeletal:  Arthritis        Arthritis          Neurological:   CVA Neuromuscular Disease,           Arthritis              CVA - Cerebrovasular Accident               Neuromuscular Disease   Endocrine:  Diabetes    Diabetes                        Physical Exam  General: Well nourished    Airway:  Mallampati: II   Mouth Opening: Normal  Neck ROM: Normal ROM    Anesthesia Plan  Type of Anesthesia, risks & benefits discussed:    Anesthesia Type: Gen Natural Airway  Informed Consent: Informed consent signed with the Patient representative and all parties understand the risks and agree with anesthesia plan.  All questions answered.   ASA Score: 4  Anesthesia Plan Notes: Consent obtained from granddaughter    Ready For Surgery From Anesthesia Perspective.   .

## 2025-02-07 NOTE — PLAN OF CARE
Problem: Adult Inpatient Plan of Care  Goal: Plan of Care Review  Outcome: Progressing  Flowsheets (Taken 2/6/2025 2138)  Plan of Care Reviewed With: family     Problem: Diabetes Comorbidity  Goal: Blood Glucose Level Within Targeted Range  Outcome: Progressing  Intervention: Monitor and Manage Glycemia  Flowsheets (Taken 2/6/2025 2138)  Glycemic Management: blood glucose monitored     Problem: Wound  Goal: Absence of Infection Signs and Symptoms  Outcome: Progressing  Intervention: Prevent or Manage Infection  Flowsheets (Taken 2/6/2025 2138)  Fever Reduction/Comfort Measures:   lightweight clothing   lightweight bedding     Problem: Wound  Goal: Improved Oral Intake  Outcome: Not Progressing  Intervention: Promote and Optimize Oral Intake  Flowsheets (Taken 2/6/2025 2138)  Nutrition Support Management: tube feeding continued as ordered

## 2025-02-07 NOTE — PROGRESS NOTES
[facility]  Progress Note    Patient Name: Eneida Majano  MRN: [id number]  Patient Class: IP- Inpatient   Admission Date: 2/3/2025  Length of Stay: 4 days  Attending Physician: Chhaya Vilchis*  Primary Care Provider: Paula Cooper NP        Subjective     Principal Problem:Cerebrovascular accident (CVA) due to embolism of right middle cerebral artery        HPI:  84-year-old female with a history of anemia, atrial fibrillation/flutter (on Eliquis), rheumatoid arthritis, diabetes, gastroesophageal reflux disease, hypertension, hyperlipidemia, and insomnia , pt was transferred to [facility] on January 28 with aphasia/dysarthria, right lateral gaze, and left-sided weakness. CT head showed evidence of a right MCA distribution stroke along with other areas of infarct. CTA of the head and neck showed occlusion of the right M2 branch. She had Vascular Neurology tele-consultation and was felt not to be a thrombolytic candidate. She was also not a thrombectomy candidate with a large core infarct on imaging. MRI brain on 1/29 also showed the areas of infarct. She was admitted with right MCA stroke, atrial flutter/fibrillation with rapid ventricular response, and encephalopathy. Anticoagulation was held due to concern for potential hemorrhagic conversion. Also troponin was elevated and she had echocardiogram that showed EF 28% with decreased right ventricular systolic function and moderate pulmonary hypertension. Mentation improved somewhat by January 31. She was placed on Plavix with plans to resume anticoagulation after 7 days. By February 1, she had increased confusion. She was more tachycardic. On the morning of February 3, tachycardia persisted and she was upgraded in status and started on esmolol infusion. She was seen by Cardiology. On the morning of February 3 she was awake but confused and not following commands. She was unable to swallow oral medicines. Blood  pressure remained stable. Pt was transfer to Hospital Medicine at [facility] in ICU status for continued rate control with esmolol.       On day of transfer to [facility] :  February 3: Sodium 138, potassium 4.1, chloride 108, CO2 19, BUN 13, creatinine 0.8, glucose 125, magnesium 1.9, white blood cells 7.04, hemoglobin 12.8, hematocrit 39.8, platelets 256  -EKG showed atrial fibrillation with ventricular rate 123. T-wave abnormality.    January 31: AST 23, ALT 20  -abdominal ultrasound had no acute findings    January 30: CT head showed redemonstration of acute right MCA infarct. No acute intracranial hemorrhage.  -INR 1.1  -echocardiogram had EF 28%. Unable to assess diastolic function due to atrial fibrillation. Decreased right ventricular systolic function. Right atrium is dilated. Moderate mitral regurgitation. Moderate tricuspid regurgitation. Moderate pulmonary hypertension with estimated PA systolic pressure 60 mmHg. Intermediate venous pressure at mmHg.    January 29: MRI brain had areas of acute infarction in the right MCA distribution largest involving the right perisylvian and lateral right frontal regions and smaller lateral right parieto-occipital cortical infarct with associated localized mass effect including effacement of adjacent portions of the right sylvian fissure and overlying cortical sulci. Non acute posterior left frontal subcortical white matter infarct. Moderate presumed microvascular ischemic change     Overview/Hospital Course:  No notes on file    Interval History: lying in bed, unresponsive, with unpurposeful movement.     BP and HR uncontrolled  Appreciates SLP rec's -remain NPO- appreciates GI plan PEG tube placement on 2/7  Granddaughter at bedside reports unpurposeful movement   .   Reports unable to control secretion - scopolamine in placement             Review of Systems   Unable to perform ROS: Patient unresponsive     Objective:     Vital Signs (Most  "Recent):  Temp: 99.6 °F (37.6 °C) (02/07/25 0728)  Pulse: (!) 121 (02/07/25 1022)  Resp: 19 (02/07/25 0816)  BP: (!) 164/89 (02/07/25 1022)  SpO2: 98 % (02/07/25 0816) Vital Signs (24h Range):  Temp:  [97.4 °F (36.3 °C)-99.6 °F (37.6 °C)] 99.6 °F (37.6 °C)  Pulse:  [] 121  Resp:  [17-26] 19  SpO2:  [94 %-100 %] 98 %  BP: ()/() 164/89     Weight: 52.4 kg (115 lb 8.3 oz)  Body mass index is 23.33 kg/m².    Intake/Output Summary (Last 24 hours) at 2/7/2025 1107  Last data filed at 2/7/2025 0715  Gross per 24 hour   Intake 660 ml   Output 600 ml   Net 60 ml         Physical Exam  Constitutional:       General: She is not in acute distress.     Appearance: She is ill-appearing. She is not toxic-appearing.      Comments: Very cachectic   HENT:      Head: Normocephalic and atraumatic.   Cardiovascular:      Rate and Rhythm: Normal rate. Rhythm irregular.   Pulmonary:      Effort: Pulmonary effort is normal.      Breath sounds: Normal breath sounds.   Abdominal:      General: Abdomen is flat.      Palpations: Abdomen is soft.   Musculoskeletal:         General: No swelling or tenderness.      Cervical back: Normal range of motion.      Right lower leg: No edema.      Left lower leg: No edema.   Skin:     General: Skin is dry.      Capillary Refill: Capillary refill takes less than 2 seconds.      Coloration: Skin is pale.   Neurological:      Mental Status: She is disoriented.             Significant Labs: A1C:   Recent Labs   Lab 10/01/24  0816 01/29/25  2300   HGBA1C 5.8* 5.7*     ABGs:   No results for input(s): "PH", "PCO2", "HCO3", "POCSATURATED", "BE", "TOTALHB", "COHB", "METHB", "O2HB", "POCFIO2", "PO2" in the last 48 hours.    Blood Culture:   No results for input(s): "LABBLOO" in the last 48 hours.    CBC:   Recent Labs   Lab 02/06/25  0422   WBC 9.30   HGB 12.1   HCT 38.6        CMP:   Recent Labs   Lab 02/06/25  0422 02/07/25  0454    136   K 4.3 4.0    106   CO2 21* 22*   GLU " "143* 107   BUN 23 18   CREATININE 0.9 0.8   CALCIUM 8.9 8.7   ALBUMIN 2.7*  --    ANIONGAP 8 8     Cardiac Markers:   No results for input(s): "CKMB", "MYOGLOBIN", "BNP", "TROPISTAT" in the last 48 hours.    Lactic Acid:   No results for input(s): "LACTATE" in the last 48 hours.    Lipase:   No results for input(s): "LIPASE" in the last 48 hours.    Lipid Panel: No results for input(s): "CHOL", "HDL", "LDLCALC", "TRIG", "CHOLHDL" in the last 48 hours.  Magnesium:   Recent Labs   Lab 02/06/25  0422   MG 1.9     Troponin:   No results for input(s): "TROPONINI", "TROPONINIHS" in the last 48 hours.    TSH:   Recent Labs   Lab 02/03/25  1353   TSH 3.156     Urine Culture:   Recent Labs   Lab 02/05/25  1331   LABURIN Multiple organisms isolated. None in predominance.  Repeat if  clinically necessary.     Urine Studies:   Recent Labs   Lab 02/05/25  1331   COLORU Yellow   APPEARANCEUA Cloudy*   PHUR >8.0*   SPECGRAV >1.030*   PROTEINUA 3+*   GLUCUA Negative   KETONESU Trace*   BILIRUBINUA Negative   OCCULTUA 2+*   NITRITE Negative   UROBILINOGEN 2.0-3.0*   LEUKOCYTESUR 3+*   RBCUA 40*   WBCUA >100*   BACTERIA Moderate*   HYALINECASTS 0       Significant Imaging: I have reviewed all pertinent imaging results/findings within the past 24 hours.    Assessment and Plan     * Cerebrovascular accident (CVA) due to embolism of right middle cerebral artery  Presented on January 28, 2025 with large stroke, patient was not a candidate for tPA versus thrombectomy  Tele stroke was consulted.  The all anticoagulants to be started after in 7 days on February 4th  Neurology consulted at [facility] after arrival on 02/03       Antithrombotics for secondary stroke prevention: Antiplatelets: None:  Large size stroke on 01/28 patient to wait total of 7 days to restart anticoagulants on 2/for    Statins for secondary stroke prevention and hyperlipidemia, if present:   Statins: Atorvastatin- 40 mg daily    Aggressive risk factor " modification: HTN, DM, A-Fib, CAD     Rehab efforts: The patient has been evaluated by a stroke team provider and the therapy needs have been fully considered based off the presenting complaints and exam findings. The following therapy evaluations are needed: PT evaluate and treat, OT evaluate and treat, SLP evaluate and treat    Diagnostics ordered/pending: Carotid ultrasound to assess vasculature, CT scan of head without contrast to asses brain parenchyma, CTA Head to assess vasculature , HgbA1C to assess blood glucose levels, Lipid Profile to assess cholesterol levels, MRA head to assess vasculature, MRA neck/arch to assess vasculature, MRI head without contrast to assess brain parenchyma, TTE to assess cardiac function/status , TSH to assess thyroid function    MRA brain on 01/29  Areas of acute infarction right MCA distribution largest involving the right perisylvian and lateral right frontal regions measuring 4.9 x 2.9 cm and smaller lateral right parietooccipital cortical infarct measuring 2.6 x 2.1 cm with associated localized mass effect including effacement of adjacent portions right sylvian fissure and overlying cortical sulci.     Nonacute posterior left frontal subcortical white matter infarct with small focus overlying cortical involvement.  Moderate presumed microvascular ischemic change white matter.    CT head on 02/03  Evolving right MCA vascular territory infarcts    MRI brain on 2/4  Stable bilateral acute/subacute supratentorial infarcts without significant extension or hemorrhagic conversion.     Background of prominent periventricular white matter changes of chronic microvascular ischemia.    EEG    This is an abnormal EEG during stupor state.  The overall degree of disorganization and slowing for given age is suggestive of a moderate to severe encephalopathy, likely a toxic metabolic encephalopathy.  There is no evidence of an epileptic process on this recording.  No seizures were recorded  during this study.   VTE prophylaxis: None: Reason for No Pharmacological VTE Prophylaxis:  Due to large stroke per Neurology recommendation had [facility] patient to be started after 7 days (2/4    BP parameters: Infarct:  Out of the window at this moment to maintain blood pressure within normal values    UTI (urinary tract infection)  UA positive  Ceftriaxone pending cx      ACP (advance care planning)  Advance Care Planning    Date: 02/04/2025    Code Status  Code status unknown. we agreed to leave patient as full code until code status is confirmed  by family./     A total of 20 min was spent on advance care planning, goals of care discussion, emotional support, formulating and communicating prognosis and exploring burden/benefit of various approaches of treatment. This discussion occurred on a fully voluntary basis with the verbal consent of the patient and/or family.      Advance Care Planning    Date: 02/05/2025  Primary team and pulmonary crit met with patient family at the bedside, updated on clinical status and imaging reports. Also discussed possible disease trajectory, family open to supportive treatment for now to include peg tube placement and possible LTAC placement. Code status also discussed and family wants to continue present treament regimen but make patient DNR in the event she decline clinically.  Questions and concerns were addressed          Code Status  In light of the patients advanced and life limiting illness,I engaged the the family in a voluntary conversation about the patient's preferences for care  at the very end of life. The patient wishes to have a natural, peaceful death.  Along those lines, the patient does not wish to have CPR or other invasive treatments performed when her heart and/or breathing stops. I communicated to the family that a DNR order would be placed in her medical record to reflect this preference.    A total of 15 min was spent on advance care  planning, goals of care discussion, emotional support, formulating and communicating prognosis and exploring burden/benefit of various approaches of treatment. This discussion occurred on a fully voluntary basis with the verbal consent of the patient and/or family.            Acute on chronic heart failure  Cardiology was consulted currently patient is on esmolol drip.  Beta blockers scheduled  Patient is a euvolemic  Patient received loading dose of digoxin for AFib with RVR uncontrolled  Echo Saline Bubble? Yes; Ultrasound enhancing contrast? Yes    Result Date: 1/30/2025    Left Ventricle: The left ventricle is normal in size. Mildly increased   wall thickness. Global hypokinesis present. There is severely reduced   systolic function. Quantitated ejection fraction is 28%. Unable to assess   diastolic function due to atrial fibrillation.    Right Ventricle: Right ventricular enlargement. Systolic function is   reduced.    Left Atrium: Left atrium is severely dilated. Agitated saline study of   the atrial septum is negative after vasalva maneuver, suggesting absence   of intracardiac shunt at the atrial level.    Right Atrium: Right atrium is dilated.    Mitral Valve: There is moderate regurgitation.  EROA 0.31 cm2    Tricuspid Valve: There is moderate regurgitation.    Pulmonary Artery: There is moderate pulmonary hypertension. The   estimated pulmonary artery systolic pressure is 60 mmHg.    IVC/SVC: Intermediate venous pressure at 8 mmHg.         Acute encephalopathy  2/3 at [facility] sent for stat CT head  -TSH T4 ammonia levels  -a neurology consulted-appreciate recs    Repeat MRI brain  unchanged   EEG   This is an abnormal EEG during stupor state.  The overall degree of disorganization and slowing for given age is suggestive of a moderate to severe encephalopathy, likely a toxic metabolic encephalopathy.  There is no evidence of an epileptic process on this recording.  No seizures were recorded during  this study.   Consult GI for PEG tube placement- planned for 2/7    Stroke due to occlusion of right middle cerebral artery  Presented on January 28, 2025 with large stroke, patient was not a candidate for tPA versus thrombectomy  Tele stroke was consulted.  The all anticoagulants to be started after in 7 days on February 4th  Neurology consulted at [facility] after arrival on 02/03       Antithrombotics for secondary stroke prevention: Antiplatelets: None:  Large size stroke on 01/28 patient to wait total of 7 days to restart anticoagulants on 2/for    Statins for secondary stroke prevention and hyperlipidemia, if present:   Statins: Atorvastatin- 40 mg daily    Aggressive risk factor modification: HTN, DM, A-Fib, CAD     Rehab efforts: The patient has been evaluated by a stroke team provider and the therapy needs have been fully considered based off the presenting complaints and exam findings. The following therapy evaluations are needed: PT evaluate and treat, OT evaluate and treat, SLP evaluate and treat    Diagnostics ordered/pending: Carotid ultrasound to assess vasculature, CT scan of head without contrast to asses brain parenchyma, CTA Head to assess vasculature , HgbA1C to assess blood glucose levels, Lipid Profile to assess cholesterol levels, MRA head to assess vasculature, MRA neck/arch to assess vasculature, MRI head without contrast to assess brain parenchyma, TTE to assess cardiac function/status , TSH to assess thyroid function    VTE prophylaxis: None: Reason for No Pharmacological VTE Prophylaxis:  Due to large stroke per Neurology recommendation had [facility] patient to be started after 7 days (2/4    BP parameters: Infarct:  Out of the window at this moment to maintain blood pressure within normal values        Atrial fibrillation with RVR  Patient has paroxysmal (<7 days) atrial fibrillation. Patient is currently in atrial fibrillation. [id number] Score: 5. The patients heart  rate in the last 24 hours is as follows:  Pulse  Min: 79  Max: 135     Antiarrhythmics  esmolol 2000 mg in sodium chloride 0.9% 100 mL (20 mg/mL), Continuous, Intravenous  metoprolol injection 5 mg, Every 6 hours PRN, Intravenous  metoprolol tartrate (LOPRESSOR) tablet 50 mg, Every 6 hours, Oral    Anticoagulants  apixaban tablet 2.5 mg, 2 times daily, Oral    Plan  - Replete lytes with a goal of K>4, Mg >2  - Patient is not anticoagulated due to recent history of massive stroke, after clearance from Neurology can consider starting patient on anticoagulated on 2/for  - Patient's afib is currently uncontrolled. Will adjust treatment as follows:  Cardiology consulted currently on esmolol drip, digoxin level than load with digoxin    Wean esmolol drip to metoprolol      Essential hypertension  Patient's blood pressure range in the last 24 hours was: BP  Min: 114/94  Max: 189/116.The patient's inpatient anti-hypertensive regimen is listed below:  Current Antihypertensives  esmolol 2000 mg in sodium chloride 0.9% 100 mL (20 mg/mL), Continuous, Intravenous  labetaloL injection 10 mg, Every 6 hours PRN, Intravenous  metoprolol tartrate (LOPRESSOR) tablet 50 mg, Every 6 hours, Oral    Plan  - BP is controlled, no changes needed to their regimen  - controlled current medications are to control AFib RVR    Gastroesophageal reflux disease  On famotidine       T2DM (type 2 diabetes mellitus)  Patient's FSGs are controlled on current medication regimen.  Last A1c reviewed-   Lab Results   Component Value Date    HGBA1C 5.7 (H) 01/29/2025     Most recent fingerstick glucose reviewed-   Recent Labs   Lab 02/03/25  0300 02/03/25  0537 02/03/25  0814 02/03/25  1244   POCTGLUCOSE 137* 121* 126* 102     Current correctional scale  Low  Maintain anti-hyperglycemic dose as follows-   Antihyperglycemics (From admission, onward)      Start     Stop Route Frequency Ordered    02/03/25 1413  insulin aspart U-100 pen 0-5 Units         --  SubQ Every 6 hours PRN 02/03/25 1313          Hold Oral hypoglycemics while patient is in the hospital.      VTE Risk Mitigation (From admission, onward)           Ordered     IP VTE HIGH RISK PATIENT  Once         02/03/25 1250     Place sequential compression device  Until discontinued         02/03/25 1250                    Discharge Planning   DELORES: 2/10/2025     Code Status: DNR   Medical Readiness for Discharge Date:   Discharge Plan A: Long-term acute care facility (LTAC)   Discharge Delays: None known at this time            Please place Justification for DME        Chhaya Vilchis MD  Department of Hospital Medicine   [place] - Telemetry

## 2025-02-07 NOTE — CONSULTS
"[facility]  Consult Note    Date of Service: 2/7/2025  Patient seen at the request of: Marco Fiore MD    Reason for Consultation  Stroke    Assessment:  Eneida Majano is a 84 y.o. female who presents with stroke in the setting of atrial fibrillation.    Patient's right M2 occulusion stroke is suggestive of an embolic etiology, likely cardioembolic given the patient's history.  TTE (bubble) - EF 28%, irregularly irregular rhythm.  Patient was prescribed Eliquis for atrial fibrillation, however, there is some concern for non-compliance.  Ok to restart DOAC 2-14 days after stroke, now restarted.  Onset of symptoms was 1/28/2025.      Exam was significant for absence of withdrawal to noxious stimulation of the distal right extremity.  Repeat MRI brain is stable.  Sensory deficit on the right may be secondary to nonacute posterior left frontal subcortical white matter infarct with small focus overlying cortical involvement.      EEG - This is an abnormal EEG during stupor state.  Diffuse disorganized low-amplitude slowing of the background was noted   No evidence of non-convulsive status epilepticus.    Persistent altered mental status.  No leukocytosis.  Swelling appears relatively minimal on CT head from 1/30.  Repeat CTH stable. Consider hypoactive delirium.  Consider effect of infarct burden, however, no acute infarct along the reticular activating system.    UA was cloudy, Cx pending.  Empiric abx started per primary; urine culture "Multiple organisms isolated. None in predominance".  May represent etiology of AMS.  Patient more alert today but movements not purposeful; may represent fluctuation vs improvement.      Plan:    - delirium precautions (minimize disruptions during sleeping hours, reorient frequently)  - repeat brain imaging if patient's mental status worsens/new focal deficit      A dictation device was used to produce this document. Use of such devices sometimes results in grammatical " errors or replacement of words that sound similarly.     Signed:    Bakari Martin MD  Neurology/Epilepsy  02/07/2025 2:51 PM      Interval Events:  - 2/6: patient persistently altered, needed haldol overnight for pulling at lines; UA cloudy w/ protein, Cx pending, empiric abx started  - 2/7: interval improvement in mental status.  Patient more active, but still does not answer examiner.      HPI:  Eneida Majano is a 84 y.o. female with   Past Medical History:   Diagnosis Date    Acid reflux     Anemia     Arthritis     Atrial fibrillation     Carpal tunnel syndrome     Cataract     Dry mouth     GERD (gastroesophageal reflux disease)     Hyperlipidemia     Hypertension     Insomnia     Other osteoporosis without current pathological fracture 3/14/2018    PONV (postoperative nausea and vomiting)     short term     Patient is unable to provide any additional history.  She does not follow commands and was taken for repeat CT head.      This is the extent of the patient's complaints at this time.    From discharge/transfer summary from transferring hospital 2/3/2025):  84-year-old female with a past medical history of anemia, atrial fibrillation/flutter on diltiazem and Eliquis at home, GERD, dyslipidemia, hypertension, and insomnia.     She now comes as a transfer from [facility] ED due to stroke like symptoms that started one day prior to presentation at 9 PM on 1/28/25.  Her symptoms were dysphagia, left-sided weakness, and right lateral gaze. Upon presentation to the ED, she was still symptomatic.  The MRI machine at [facility] is broken, so she is being transferred to [facility] for MRI capability.  She had a completed tele-stroke consult done in the ED (please refer to Dr. Bhatt's note).     The patient's family are at the bedside and provide all of the history, as Ms. Majano is very lethargic and cannot talk to me or cooperate with exam.  Even with the nurse shaking her, she just stares ahead without  interacting or speaking.  The family tells me that yesterday around 3pm, she would not answer her grandson, who found her door locked as well.  They broke the door down and found her lying on the ground incontinent of urine.  She was able to ask for a cup of water, but not get up.  He picked her up and carried her to the chair and called 911.  She also would not speak at that time as well.    Hospital Course:   Patient admitted to telemetry floor and started on neurovascular protective medications and evaluated by Neurology.  Holding on anticoagulation at this time due to concerns for possible risk of hemorrhagic conversion and bleed.  Repeat CTA of the head recommended by Neurology to evaluate for any bleed.  Patient lethargic and poorly responsive when seen by medical team but by afternoon, PT/OT was working with patient and recommendations for high intensity therapy appreciated.  Case management consulted for assistance in placement.     1/31:  Patient's mental status greatly improved but still having significant lower extremity weakness.  Working with PT/OT and recommendations for tight intensity therapy appreciated.  CTA of the head shows no signs of acute bleed or hemorrhagic conversion.  Will plan on resuming anticoagulation on day 7 as per neurology recommendations.  Will start patient on Plavix and hold ASA for now.  Continue atorvastatin and transition from IV to p.o. metoprolol.  Awaiting SNF/rehab placement.     2/3:  Patient went into AFib with RVR overnight.  Echocardiogram shows reduced ejection fraction of 26%.  Cardiology recommended initiation of esmolol GTT and patient transferred to ICU for further evaluation and management of AFib with RVR.  Patient remains tachycardic despite maximum as well dose, blood pressure is within normal limits at this time.  Patient's mental status unchanged and remains severely confused, responds poorly to questions and commands, and still has significant focal  neurological deficits.  After further discussion with Cardiology, recommendation to transfer to higher level care were Cardiology will be available to assist in management.  Patient breathing comfortably on 2 L O2 via nasal cannula.  Patient is stable for transfer via EMS to [facility] for higher level of care and access to Interventional Cardiology.       TSH   Date Value Ref Range Status   02/03/2025 3.156 0.400 - 4.000 uIU/mL Final   01/28/2025 1.451 0.400 - 4.000 uIU/mL Final     Hemoglobin A1C   Date Value Ref Range Status   01/29/2025 5.7 (H) 4.0 - 5.6 % Final     Comment:     ADA Screening Guidelines:  5.7-6.4%  Consistent with prediabetes  >or=6.5%  Consistent with diabetes    High levels of fetal hemoglobin interfere with the HbA1C  assay. Heterozygous hemoglobin variants (HbS, HgC, etc)do  not significantly interfere with this assay.   However, presence of multiple variants may affect accuracy.     10/01/2024 5.8 (H) 4.0 - 5.6 % Final     Comment:     ADA Screening Guidelines:  5.7-6.4%  Consistent with prediabetes  >or=6.5%  Consistent with diabetes    High levels of fetal hemoglobin interfere with the HbA1C  assay. Heterozygous hemoglobin variants (HbS, HgC, etc)do  not significantly interfere with this assay.   However, presence of multiple variants may affect accuracy.           Review of Systems:  ROS negative unless noted in HPI    Past Surgical History:  Past Surgical History:   Procedure Laterality Date    APPENDECTOMY      CARDIOVERSION N/A 2/8/2019    Procedure: Cardioversion;  Surgeon: Emmanuel Matthew MD;  Location: [facility] CATH LAB;  Service: Cardiology;  Laterality: N/A;    COLONOSCOPY      COLONOSCOPY N/A 5/28/2020    Procedure: COLONOSCOPY;  Surgeon: Gopi Brooke MD;  Location: [facility];  Service: Endoscopy;  Laterality: N/A;    COLONOSCOPY N/A 7/7/2020    Procedure: COLONOSCOPY;  Surgeon: Gopi Brooke MD;  Location: [facility];  Service: Endoscopy;  Laterality: N/A;     ESOPHAGOGASTRODUODENOSCOPY N/A 5/28/2020    Procedure: EGD (ESOPHAGOGASTRODUODENOSCOPY);  Surgeon: Gopi Brooke MD;  Location: [facility];  Service: Endoscopy;  Laterality: N/A;    FOOT SURGERY Left     HAND SURGERY      HERNIA REPAIR      x2    HYSTERECTOMY  age 32    fibroids    OOPHORECTOMY         Family History:  Family History   Problem Relation Name Age of Onset    Arthritis Father      Cancer Father          liver    Leukemia Mother      Diabetes Sister      Cancer Sister          throat    Heart disease Sister      Heart disease Brother      Asthma Daughter      Colon cancer Neg Hx         Social History:  Social History     Tobacco Use    Smoking status: Never     Passive exposure: Never    Smokeless tobacco: Never   Substance Use Topics    Alcohol use: No     Alcohol/week: 0.0 standard drinks of alcohol    Drug use: No       Allergies:  Asa [aspirin]    Outpatient Medications:  Prior to Admission medications    Medication Sig Start Date End Date Taking? Authorizing Provider   acetaminophen (TYLENOL) 650 MG TbSR Take 1 tablet (650 mg total) by mouth 3 (three) times daily as needed. 3/14/19   Sylwia Barrera NP   albuterol (PROVENTIL/VENTOLIN HFA) 90 mcg/actuation inhaler INHALE 2 PUFFS INTO THE LUNGS EVERY 6 HOURS AS NEEDED WHEEZING RESCUE 5/9/23   Paula Cooper NP   apixaban (ELIQUIS) 2.5 mg Tab Take 1 tablet (2.5 mg total) by mouth 2 (two) times daily. 8/6/24   Paula Cooper NP   aspirin 81 MG Chew Take 1 tablet (81 mg total) by mouth once daily. 3/14/19 7/13/23  Sylwia Barrera NP   baclofen (LIORESAL) 5 mg Tab tablet Take 2 tablets (10 mg total) by mouth after lunch for 3 days, THEN 1 tablet (5 mg total) after lunch for 2 days, THEN 0.5 tablets (2.5 mg total) after lunch for 2 days. 10/29/24 11/5/24  Anthony Liao MD   benzonatate (TESSALON) 200 MG capsule Take 1 capsule (200 mg total) by mouth 3 (three) times daily as needed for Cough. 2/19/23   Andre Rubio NP   blood  pressure test kit-large Kit 1 kit by Misc.(Non-Drug; Combo Route) route 2 (two) times daily as needed. 3/14/19   Sylwia Barrera NP   blood sugar diagnostic (TRUE METRIX GLUCOSE TEST STRIP) Strp Check BS once daily 11/15/24   Paula Cooper NP   blood-glucose meter (TRUE METRIX GLUCOSE METER) kit Use to check BS once daily 11/15/24 11/15/25  Paula Cooper NP   diltiaZEM (CARDIZEM CD) 180 MG 24 hr capsule Take 1 capsule (180 mg total) by mouth once daily. 1/28/25 1/28/26  Shadi Irwin MD   fluticasone propionate (FLONASE) 50 mcg/actuation nasal spray 1 spray to each nostril twice daily for 7 days, then may continue 1 spray to each nostril ONCE daily as needed for sinus congestion. 2/19/23   Andre Rubio NP   furosemide (LASIX) 20 MG tablet Take 1 tablet (20 mg total) by mouth daily as needed (edema). 10/3/24 10/3/25  Paula Cooper NP   hydroxychloroquine (PLAQUENIL) 200 mg tablet Take 1 tablet (200 mg total) by mouth 2 (two) times daily. 10/3/19   Billy Mahan MD   lancets (LANCETS,THIN) Misc Check BS once daily 11/15/24   Paula Cooper NP   lisinopriL 10 MG tablet Take 1 tablet (10 mg total) by mouth every evening. for 120 doses 10/29/24 2/26/25  Anthony Liao MD   metoprolol succinate (TOPROL-XL) 50 MG 24 hr tablet Take 1 tablet (50 mg total) by mouth 2 (two) times daily. 10/29/24 10/29/25  Anthony Liao MD   nystatin (MYCOSTATIN) cream Apply topically 2 (two) times daily. 10/3/24   Paula Cooper NP   nystatin (MYCOSTATIN) powder Apply topically 4 (four) times daily. 1/7/25   Paula Cooper NP   polyethylene glycol (GLYCOLAX) 17 gram/dose powder Take 17 g by mouth once daily. 2/3/22   Leona Boyer MD   triamcinolone acetonide 0.1% (KENALOG) 0.1 % cream Apply topically 2 (two) times daily. for 7 days 5/7/21 5/14/21  Paula Cooper NP       Physical exam:    Vitals: BP (!) 163/109 (BP Location: Left leg, Patient Position: Lying)   Pulse 99   Temp 99.6 °F (37.6 °C)  "(Axillary)   Resp 19   Ht 4' 11" (1.499 m)   Wt 52.4 kg (115 lb 8.3 oz)   SpO2 98%   Breastfeeding No   BMI 23.33 kg/m²     General:   Actively moving BUE, grunting  Head/Neck:   Normocephalic,atraumatic  Pulm:  Non-labored breathing     Mental Status:  Severe dysarthria, does not follow one step commands  CN:  II: pupils equal and reactive to light  III, IV, VI: unable to completely assess  VII: Left facial droop   VIII: Responds to loud voice  IX, X: does not open mouth to command  Motor: Normal bulk throughout all four extremities.   LUE: does not participate with exam, spontaneous movement appreciated  RUE:  does not participate with exam, spontaneous movement appreciated  LLE:  does not participate with exam, withdraws  RLE: does not participate with exam, withdraws  No tremors   Sensory: Withdrawing to touch x4 extremities  Reflexes: LUE: Biceps 2+ brachioradialis 2+  RUE: Biceps 2+, brachioradialis 2+  LLE: Knee 2+  RLE: Knee 2+  Coordination:  Unable to assess  Gait: bed bound    Imaging:  All pertinent imaging was personally reviewed.    On my personal review:   MRI shows right MCA distribution infarct (1/29), more recent CT (1/30) does not show significant unilateral swelling.      Results for orders placed during the hospital encounter of 01/28/25    MRI Brain Without Contrast    Narrative  EXAMINATION:  MRI BRAIN WITHOUT CONTRAST    CLINICAL HISTORY:  Stroke, follow up;    TECHNIQUE:  Brain studied using sagittal and axial T1, axial T2, FLAIR and DW Images.    COMPARISON:  CT head 01/28/2025    FINDINGS:  Areas of acute infarction are evident the largest of which right MCA distribution involving the anterior right perisylvian and lateral right frontal regions measuring approximately 4.9 x 2.9 cm and smaller lateral right parietooccipital cortical infarct measuring approximately 2.6 x 2.1 cm as seen on the prior CT.  Associated localized mass effect again noted including effacement of the adjacent " portions ipsilateral right sylvian fissure and overlying cortical sulci.    Small possibly subacute or chronic posterior left frontal subcortical infarct measuring approximately 1.5 cm with small focus involvement of the overlying cortex noted.  Additional nonspecific T2 hyperintensities white matter may reflect moderate microvascular ischemic change.  Old lacunar infarcts and or incidental prominent perivascular spaces bilateral basal ganglia and thalami.  Presumed microvascular ischemic change central ange.    Visualized paranasal sinuses are clear as are mastoid air cells.    Impression  Areas of acute infarction right MCA distribution largest involving the right perisylvian and lateral right frontal regions measuring 4.9 x 2.9 cm and smaller lateral right parietooccipital cortical infarct measuring 2.6 x 2.1 cm with associated localized mass effect including effacement of adjacent portions right sylvian fissure and overlying cortical sulci.    Nonacute posterior left frontal subcortical white matter infarct with small focus overlying cortical involvement.    Moderate presumed microvascular ischemic change white matter.      Electronically signed by: Saige Garcia MD  Date:    01/29/2025  Time:    09:03

## 2025-02-07 NOTE — SUBJECTIVE & OBJECTIVE
Interval History: lying in bed, unresponsive, with unpurposeful movement.     BP and HR uncontrolled  Appreciates SLP rec's -remain NPO- appreciates GI plan PEG tube placement on 2/7  Granddaughter at bedside reports unpurposeful movement   .   Reports unable to control secretion - scopolamine in placement             Review of Systems   Unable to perform ROS: Patient unresponsive     Objective:     Vital Signs (Most Recent):  Temp: 99.6 °F (37.6 °C) (02/07/25 0728)  Pulse: (!) 121 (02/07/25 1022)  Resp: 19 (02/07/25 0816)  BP: (!) 164/89 (02/07/25 1022)  SpO2: 98 % (02/07/25 0816) Vital Signs (24h Range):  Temp:  [97.4 °F (36.3 °C)-99.6 °F (37.6 °C)] 99.6 °F (37.6 °C)  Pulse:  [] 121  Resp:  [17-26] 19  SpO2:  [94 %-100 %] 98 %  BP: ()/() 164/89     Weight: 52.4 kg (115 lb 8.3 oz)  Body mass index is 23.33 kg/m².    Intake/Output Summary (Last 24 hours) at 2/7/2025 1107  Last data filed at 2/7/2025 0715  Gross per 24 hour   Intake 660 ml   Output 600 ml   Net 60 ml         Physical Exam  Constitutional:       General: She is not in acute distress.     Appearance: She is ill-appearing. She is not toxic-appearing.      Comments: Very cachectic   HENT:      Head: Normocephalic and atraumatic.   Cardiovascular:      Rate and Rhythm: Normal rate. Rhythm irregular.   Pulmonary:      Effort: Pulmonary effort is normal.      Breath sounds: Normal breath sounds.   Abdominal:      General: Abdomen is flat.      Palpations: Abdomen is soft.   Musculoskeletal:         General: No swelling or tenderness.      Cervical back: Normal range of motion.      Right lower leg: No edema.      Left lower leg: No edema.   Skin:     General: Skin is dry.      Capillary Refill: Capillary refill takes less than 2 seconds.      Coloration: Skin is pale.   Neurological:      Mental Status: She is disoriented.             Significant Labs: A1C:   Recent Labs   Lab 10/01/24  0816 01/29/25  2300   HGBA1C 5.8* 5.7*     ABGs:   No  "results for input(s): "PH", "PCO2", "HCO3", "POCSATURATED", "BE", "TOTALHB", "COHB", "METHB", "O2HB", "POCFIO2", "PO2" in the last 48 hours.    Blood Culture:   No results for input(s): "LABBLOO" in the last 48 hours.    CBC:   Recent Labs   Lab 02/06/25  0422   WBC 9.30   HGB 12.1   HCT 38.6        CMP:   Recent Labs   Lab 02/06/25  0422 02/07/25  0454    136   K 4.3 4.0    106   CO2 21* 22*   * 107   BUN 23 18   CREATININE 0.9 0.8   CALCIUM 8.9 8.7   ALBUMIN 2.7*  --    ANIONGAP 8 8     Cardiac Markers:   No results for input(s): "CKMB", "MYOGLOBIN", "BNP", "TROPISTAT" in the last 48 hours.    Lactic Acid:   No results for input(s): "LACTATE" in the last 48 hours.    Lipase:   No results for input(s): "LIPASE" in the last 48 hours.    Lipid Panel: No results for input(s): "CHOL", "HDL", "LDLCALC", "TRIG", "CHOLHDL" in the last 48 hours.  Magnesium:   Recent Labs   Lab 02/06/25  0422   MG 1.9     Troponin:   No results for input(s): "TROPONINI", "TROPONINIHS" in the last 48 hours.    TSH:   Recent Labs   Lab 02/03/25  1353   TSH 3.156     Urine Culture:   Recent Labs   Lab 02/05/25  1331   LABURIN Multiple organisms isolated. None in predominance.  Repeat if  clinically necessary.     Urine Studies:   Recent Labs   Lab 02/05/25  1331   COLORU Yellow   APPEARANCEUA Cloudy*   PHUR >8.0*   SPECGRAV >1.030*   PROTEINUA 3+*   GLUCUA Negative   KETONESU Trace*   BILIRUBINUA Negative   OCCULTUA 2+*   NITRITE Negative   UROBILINOGEN 2.0-3.0*   LEUKOCYTESUR 3+*   RBCUA 40*   WBCUA >100*   BACTERIA Moderate*   HYALINECASTS 0       Significant Imaging: I have reviewed all pertinent imaging results/findings within the past 24 hours.  "

## 2025-02-07 NOTE — PLAN OF CARE
spoke with Brinda at [facility]. She is aware patient will be getting PEG tube placed. She spoke with their director and patient appropriate for LTAC. Dr. Booker reports she would be medically ready for LTAC.  faxed [facility] requested. I left voicemail for Marcia at [facility] to update.  will continue to follow patient through transitions of care and assist with any discharge needs.     4130--Email also sent to Marcia at [facility] regarding LTAC request.     went to meet with patient. Patient off the floor, no family at bedside.    Brinda at [facility] [phone]) [phone]--Patient arrived back to floor. I spoke with granddaughter Lupe and answered all questions.    Emergency Contacts    Name Relation Home Work Mobile   Adelina Majano Daughter   [phone]      Other Contacts    Name Relation Home Work Mobile   harrison tripp Sister   [phone]   Tania Majano Daughter   [phone]     Future Appointments   Date Time Provider Department Center   2/11/2025 11:30 AM CHA US1 [facility] ULSOUND [facility]   2/18/2025 11:00 AM Paula Cooper NP [facility]   2/20/2025 11:15 AM OPHTHALMOLOGY TESTING, [facility]AL [facility]   2/20/2025 12:30 PM JOANNE Kimbrough IV, MD [facility] O[facility]AL [facility]   4/3/2025  7:50 AM [facility]   4/10/2025  9:00 AM Paula Cooper NP [facility] FAM MED [facility]   6/16/2025  9:00 AM Pepe Izaguirre MD [facility] DERM [facility]         02/07/25 1034   Discharge Reassessment   Assessment Type Discharge Planning Reassessment   Did the patient's condition or plan change since previous assessment? No   Discharge Plan A Long-term acute care facility (LTAC)   Discharge Plan B   (TBD)   DME Needed Upon Discharge  other (see comments)  (TBD)   Transition of Care Barriers None   Why the patient remains in the hospital Requires continued medical care   Post-Acute Status   Post-Acute Authorization Placement   Post-Acute  Placement Status Pending payor review/awaiting authorization (if required)   Hospital Resources/Appts/Education Provided Appointments scheduled and added to AVS   Discharge Delays None known at this time     Natacha Harrison RN    [phone]

## 2025-02-07 NOTE — PROGRESS NOTES
[facility] - Telemetry  Adult Nutrition  Progress Note    SUMMARY       Recommendations    Recommendation:  1. Continue current TF as tolerated.   2. Monitor weight/labs.   3. RD to continue to follow to monitor TF tolerance.    Goals:  TF to meet at least 85% EEN/EPN by RD follow up  Nutrition Goal Status: progressing towards goal  Communication of RD Recs: reviewed with RN    Assessment and Plan  Nutrition Problem  Inadequate energy intake    Related to (etiology):   stroke    Signs and Symptoms (as evidenced by):   NPO, dysphagia, AMS     Interventions:  Collaboration with other providers    Nutrition Diagnosis Status:   Continues      Malnutrition Assessment  Thoracic and Lumbar Region: mild depletion   [place] Region (Muscle Loss): moderate depletion  Clavicle Bone Region (Muscle Loss): mild depletion       Reason for Assessment  Reason For Assessment: RD follow-up  Diagnosis: stroke/CVA  General Information Comments: Pt admitted with stroke. Transferred from [facility]. Pt NPO per SLP recs. NG tube in place. Receiving TF of Isosource 1.5 at 40ml/hr. Tolerating TF per RN. NFPE completed 2/4-mild wasting of clavicles and thoracic region with moderate wasting of temples. Alexey 11-skin intact. No weight loss noted x 6 months. Noted plan for PEG placement tomorrow 2/7.  Nutrition Discharge Planning: d/c needs to be determined    Past Medical History:   Diagnosis Date    Acid reflux     Anemia     Arthritis     Atrial fibrillation     Carpal tunnel syndrome     Cataract     Dry mouth     GERD (gastroesophageal reflux disease)     Hyperlipidemia     Hypertension     Insomnia     Other osteoporosis without current pathological fracture 3/14/2018    PONV (postoperative nausea and vomiting)     short term        Nutrition/Diet History  Food Preferences: unable to assess  Spiritual, Cultural Beliefs, Religion Practices, Values that Affect Care: no  Factors Affecting Nutritional Intake: NPO, impaired cognitive  "status/motor control    Anthropometrics  Height: 4' 11" (149.9 cm)  Height (inches): 59 in  Height Method: Estimated  Weight: 52.4 kg (115 lb 8.3 oz)  Weight (lb): 115.52 lb  Weight Method: Bed Scale  Ideal Body Weight (IBW), Female: 95 lb  % Ideal Body Weight, Female (lb): 121.6 %  BMI (Calculated): 23.3  BMI Grade: 18.5-24.9 - normal  Usual Body Weight (UBW), k.5 kg (8/6)  % Usual Body Weight: 108.27  % Weight Change From Usual Weight: 8.04 %     Lab/Procedures/Meds  Pertinent Labs Reviewed: reviewed  Pertinent Labs Comments: Glu 143H, Phos 2.4L, Alb 2.7L  Pertinent Medications Reviewed: reviewed  Pertinent Medications Comments: digoxin, lisinopril, lopressor, senna    Estimated/Assessed Needs  Weight Used For Calorie Calculations: 52.4 kg (115 lb 8.3 oz)  Energy Calorie Requirements (kcal): 1572 (30 kcal/kg)  Energy Need Method: Kcal/kg  Protein Requirements: 52g (1.0g/kg)  Weight Used For Protein Calculations: 52.4 kg (115 lb 8.3 oz)  Estimated Fluid Requirement Method: RDA Method  RDA Method (mL): 1572    Nutrition Prescription Ordered  Current Diet Order: NPO  Current Nutrition Support Formula Ordered: Isosource 1.5  Current Nutrition Support Rate Ordered: 40 (ml)  Current Nutrition Support Frequency Ordered: ml/hr    Evaluation of Received Nutrient/Fluid Intake  Enteral Calories (kcal): 1440  Enteral Protein (gm): 81  Enteral (Free Water) Fluid (mL): 916  % Kcal Needs: 91  % Protein Needs: 155  I/O: 240/100  Energy Calories Required: not meeting needs  Protein Required: not meeting needs  Fluid Required: not meeting needs  Comments: LBM 2/5  % Intake of Estimated Energy Needs: 75 - 100 %  % Meal Intake: NPO    Nutrition Risk  Level of Risk/Frequency of Follow-up:  (2xweekly)     Monitor and Evaluation  Food and Nutrient Intake: energy intake  Food and Nutrient Adminstration: diet order, enteral and parenteral nutrition administration  Physical Activity and Function: nutrition-related ADLs and " IADLs  Anthropometric Measurements: weight  Biochemical Data, Medical Tests and Procedures: electrolyte and renal panel  Nutrition-Focused Physical Findings: overall appearance     Nutrition Related Social Determinants of Health: SDOH: Adequate food in home environment     Nutrition Follow-Up  RD Follow-up?: Yes

## 2025-02-07 NOTE — PROVATION PATIENT INSTRUCTIONS
Discharge Summary/Instructions after an Endoscopic Procedure  Patient Name: Eneida Majano  Patient MRN: [id number]  Patient YOB: 1940  Friday, February 7, 2025  Sidney Mason MD  Dear patient,  As a result of recent federal legislation (The Federal Cures Act), you may   receive lab or pathology results from your procedure in your [facility]   account before your physician is able to contact you. Your physician or   their representative will relay the results to you with their   recommendations at their soonest availability.  Thank you,  Your health is very important to us during the Covid Crisis. Following your   procedure today, you will receive a daily text for 2 weeks asking about   signs or symptoms of Covid 19.  Please respond to this text when you   receive it so we can follow up and keep you as safe as possible.   RESTRICTIONS:  During your procedure today, you received medications for sedation.  These   medications may affect your judgment, balance and coordination.  Therefore,   for 24 hours, you have the following restrictions:   - DO NOT drive a car, operate machinery, make legal/financial decisions,   sign important papers or drink alcohol.    ACTIVITY:  Today: no heavy lifting, straining or running due to procedural   sedation/anesthesia.  The following day: return to full activity including work.  DIET:  Eat and drink normally unless instructed otherwise.     TREATMENT FOR COMMON SIDE EFFECTS:  - Mild abdominal pain, nausea, belching, bloating or excessive gas:  rest,   eat lightly and use a heating pad.  - Sore Throat: treat with throat lozenges and/or gargle with warm salt   water.  - Because air was used during the procedure, expelling large amounts of air   from your rectum or belching is normal.  - If a bowel prep was taken, you may not have a bowel movement for 1-3 days.    This is normal.  SYMPTOMS TO WATCH FOR AND REPORT TO YOUR PHYSICIAN:  1. Abdominal pain or bloating, other  than gas cramps.  2. Chest pain.  3. Back pain.  4. Signs of infection such as: chills or fever occurring within 24 hours   after the procedure.  5. Rectal bleeding, which would show as bright red, maroon, or black stools.   (A tablespoon of blood from the rectum is not serious, especially if   hemorrhoids are present.)  6. Vomiting.  7. Weakness or dizziness.  GO DIRECTLY TO THE NEAREST EMERGENCY ROOM IF YOU HAVE ANY OF THE FOLLOWING:      Difficulty breathing              Chills and/or fever over 101 F   Persistent vomiting and/or vomiting blood   Severe abdominal pain   Severe chest pain   Black, tarry stools   Bleeding- more than one tablespoon   Any other symptom or condition that you feel may need urgent attention  Your doctor recommends these additional instructions:  If any biopsies were taken, your doctors clinic will contact you in 1 to 2   weeks with any results.  - [facility] to [facility].   - Please follow the post-PEG recommendations including: Nutrition consult   for formula and volume, advance food and medications per primary care   provider, external bolster snug to abdominal wall, change dressing once per   day, NPO x4 hrs then water today, may use PEG tomorrow for feedings and   check site for bleeding q 4 hrs.   - Resume Eliquis (apixaban) at prior dose in 2 days.   - Start protonix 40mg daily. Hold any anticoag for 48 hours, then ok to   resume.  - Await pathology results.   - Await path from small hiatal hernia sac polypoid nodule, likely no further   action needed given ongoing medical comorbidities.  For questions, problems or results please call your physician - Sidney Mason MD.  EMERGENCY PHONE NUMBER: [phone],  LAB RESULTS: [phone]  IF A COMPLICATION OR EMERGENCY SITUATION ARISES AND YOU ARE UNABLE TO REACH   YOUR PHYSICIAN - GO DIRECTLY TO THE EMERGENCY ROOM.  Sidney Mason MD  2/7/2025 2:26:12 PM  This report has been verified and signed  electronically.  Dear patient,  As a result of recent federal legislation (The Federal Cures Act), you may   receive lab or pathology results from your procedure in your [facility]   account before your physician is able to contact you. Your physician or   their representative will relay the results to you with their   recommendations at their soonest availability.  Thank you,  PROVATION

## 2025-02-07 NOTE — PLAN OF CARE
Problem: Adult Inpatient Plan of Care  Goal: Plan of Care Review  Outcome: Progressing  Goal: Patient-Specific Goal (Individualized)  Outcome: Progressing     Admitted patient from ICU department. Patient GCS is 10. NIHSS of 20. Bedbound. With NGT over left nostril at 60 cm length. Placed patient on new purewick. 4 eyes done with COLIN Roy. Informed md that patient is continuously snoring but vitally stable. MD acknowledged. Placed patient on oxygen.

## 2025-02-07 NOTE — PT/OT/SLP PROGRESS
Occupational Therapy  Missed Visit    Patient Name:  Eneida Majano   MRN:  [id number]    11:00 - Patient not seen today secondary to Off the floor for procedure/surgery (having PEG tube placed).    2/7/2025

## 2025-02-07 NOTE — TRANSFER OF CARE
"Anesthesia Transfer of Care Note    Patient: Eneida Majano    Procedure(s) Performed: Procedure(s) (LRB):  EGD, WITH PEG TUBE INSERTION (N/A)    Patient location: GI    Anesthesia Type: MAC    Transport from OR: Transported from OR on 2-3 L/min O2 by NC with adequate spontaneous ventilation    Post pain: adequate analgesia    Post assessment: no apparent anesthetic complications    Post vital signs: stable    Level of consciousness: responds to stimulation    Nausea/Vomiting: no nausea/vomiting    Complications: none    Transfer of care protocol was followed      Last vitals: Visit Vitals  BP (!) 138/71 (BP Location: Left arm, Patient Position: Lying)   Pulse 88   Temp 36.8 °C (98.2 °F) (Temporal)   Resp 18   Ht 4' 11" (1.499 m)   Wt 52.4 kg (115 lb 8.3 oz)   SpO2 94%   Breastfeeding No   BMI 23.33 kg/m²     "

## 2025-02-08 LAB
ALBUMIN SERPL BCP-MCNC: 2.3 G/DL (ref 3.5–5.2)
ANION GAP SERPL CALC-SCNC: 11 MMOL/L (ref 8–16)
ANION GAP SERPL CALC-SCNC: 7 MMOL/L (ref 8–16)
ANION GAP SERPL CALC-SCNC: 9 MMOL/L (ref 8–16)
BASOPHILS # BLD AUTO: 0.02 K/UL (ref 0–0.2)
BASOPHILS NFR BLD: 0.3 % (ref 0–1.9)
BUN SERPL-MCNC: 13 MG/DL (ref 8–23)
BUN SERPL-MCNC: 14 MG/DL (ref 8–23)
BUN SERPL-MCNC: 15 MG/DL (ref 8–23)
CALCIUM SERPL-MCNC: 8.5 MG/DL (ref 8.7–10.5)
CHLORIDE SERPL-SCNC: 103 MMOL/L (ref 95–110)
CHLORIDE SERPL-SCNC: 104 MMOL/L (ref 95–110)
CHLORIDE SERPL-SCNC: 105 MMOL/L (ref 95–110)
CO2 SERPL-SCNC: 23 MMOL/L (ref 23–29)
CO2 SERPL-SCNC: 23 MMOL/L (ref 23–29)
CO2 SERPL-SCNC: 25 MMOL/L (ref 23–29)
CREAT SERPL-MCNC: 0.8 MG/DL (ref 0.5–1.4)
CREAT SERPL-MCNC: 0.9 MG/DL (ref 0.5–1.4)
CREAT SERPL-MCNC: 0.9 MG/DL (ref 0.5–1.4)
DIFFERENTIAL METHOD BLD: ABNORMAL
EOSINOPHIL # BLD AUTO: 0.1 K/UL (ref 0–0.5)
EOSINOPHIL NFR BLD: 0.8 % (ref 0–8)
ERYTHROCYTE [DISTWIDTH] IN BLOOD BY AUTOMATED COUNT: 15.7 % (ref 11.5–14.5)
EST. GFR  (NO RACE VARIABLE): >60 ML/MIN/1.73 M^2
GLUCOSE SERPL-MCNC: 262 MG/DL (ref 70–110)
GLUCOSE SERPL-MCNC: 264 MG/DL (ref 70–110)
GLUCOSE SERPL-MCNC: 322 MG/DL (ref 70–110)
HCT VFR BLD AUTO: 38.3 % (ref 37–48.5)
HGB BLD-MCNC: 11.9 G/DL (ref 12–16)
IMM GRANULOCYTES # BLD AUTO: 0.04 K/UL (ref 0–0.04)
IMM GRANULOCYTES NFR BLD AUTO: 0.6 % (ref 0–0.5)
LYMPHOCYTES # BLD AUTO: 0.7 K/UL (ref 1–4.8)
LYMPHOCYTES NFR BLD: 10.1 % (ref 18–48)
MAGNESIUM SERPL-MCNC: 1.8 MG/DL (ref 1.6–2.6)
MCH RBC QN AUTO: 28.2 PG (ref 27–31)
MCHC RBC AUTO-ENTMCNC: 31.1 G/DL (ref 32–36)
MCV RBC AUTO: 91 FL (ref 82–98)
MONOCYTES # BLD AUTO: 0.4 K/UL (ref 0.3–1)
MONOCYTES NFR BLD: 5.7 % (ref 4–15)
NEUTROPHILS # BLD AUTO: 5.5 K/UL (ref 1.8–7.7)
NEUTROPHILS NFR BLD: 82.5 % (ref 38–73)
NRBC BLD-RTO: 0 /100 WBC
PHOSPHATE SERPL-MCNC: 1.9 MG/DL (ref 2.7–4.5)
PHOSPHATE SERPL-MCNC: 1.9 MG/DL (ref 2.7–4.5)
PLATELET # BLD AUTO: 289 K/UL (ref 150–450)
PMV BLD AUTO: 9.9 FL (ref 9.2–12.9)
POCT GLUCOSE: 123 MG/DL (ref 70–110)
POCT GLUCOSE: 130 MG/DL (ref 70–110)
POCT GLUCOSE: 134 MG/DL (ref 70–110)
POCT GLUCOSE: 20 MG/DL (ref 70–110)
POCT GLUCOSE: 24 MG/DL (ref 70–110)
POCT GLUCOSE: 35 MG/DL (ref 70–110)
POCT GLUCOSE: 50 MG/DL (ref 70–110)
POCT GLUCOSE: 58 MG/DL (ref 70–110)
POCT GLUCOSE: 90 MG/DL (ref 70–110)
POCT GLUCOSE: <20 MG/DL (ref 70–110)
POTASSIUM SERPL-SCNC: 4.1 MMOL/L (ref 3.5–5.1)
POTASSIUM SERPL-SCNC: 4.1 MMOL/L (ref 3.5–5.1)
POTASSIUM SERPL-SCNC: 4.2 MMOL/L (ref 3.5–5.1)
RBC # BLD AUTO: 4.22 M/UL (ref 4–5.4)
SODIUM SERPL-SCNC: 135 MMOL/L (ref 136–145)
SODIUM SERPL-SCNC: 136 MMOL/L (ref 136–145)
SODIUM SERPL-SCNC: 139 MMOL/L (ref 136–145)
WBC # BLD AUTO: 6.62 K/UL (ref 3.9–12.7)

## 2025-02-08 PROCEDURE — 63600175 PHARM REV CODE 636 W HCPCS: Performed by: FAMILY MEDICINE

## 2025-02-08 PROCEDURE — 80048 BASIC METABOLIC PNL TOTAL CA: CPT

## 2025-02-08 PROCEDURE — 25000003 PHARM REV CODE 250: Performed by: REGISTERED NURSE

## 2025-02-08 PROCEDURE — 80069 RENAL FUNCTION PANEL: CPT | Performed by: INTERNAL MEDICINE

## 2025-02-08 PROCEDURE — 36415 COLL VENOUS BLD VENIPUNCTURE: CPT | Performed by: FAMILY MEDICINE

## 2025-02-08 PROCEDURE — 25000003 PHARM REV CODE 250: Performed by: STUDENT IN AN ORGANIZED HEALTH CARE EDUCATION/TRAINING PROGRAM

## 2025-02-08 PROCEDURE — 80048 BASIC METABOLIC PNL TOTAL CA: CPT | Mod: 91 | Performed by: FAMILY MEDICINE

## 2025-02-08 PROCEDURE — 94761 N-INVAS EAR/PLS OXIMETRY MLT: CPT

## 2025-02-08 PROCEDURE — 25000003 PHARM REV CODE 250: Performed by: INTERNAL MEDICINE

## 2025-02-08 PROCEDURE — 83735 ASSAY OF MAGNESIUM: CPT | Performed by: INTERNAL MEDICINE

## 2025-02-08 PROCEDURE — 84100 ASSAY OF PHOSPHORUS: CPT

## 2025-02-08 PROCEDURE — 36415 COLL VENOUS BLD VENIPUNCTURE: CPT

## 2025-02-08 PROCEDURE — 25000003 PHARM REV CODE 250: Performed by: NURSE PRACTITIONER

## 2025-02-08 PROCEDURE — 27000221 HC OXYGEN, UP TO 24 HOURS

## 2025-02-08 PROCEDURE — 25000003 PHARM REV CODE 250: Performed by: FAMILY MEDICINE

## 2025-02-08 PROCEDURE — 99900035 HC TECH TIME PER 15 MIN (STAT)

## 2025-02-08 PROCEDURE — 25000003 PHARM REV CODE 250

## 2025-02-08 PROCEDURE — 11000001 HC ACUTE MED/SURG PRIVATE ROOM

## 2025-02-08 PROCEDURE — 85025 COMPLETE CBC W/AUTO DIFF WBC: CPT | Performed by: INTERNAL MEDICINE

## 2025-02-08 RX ORDER — DILTIAZEM HYDROCHLORIDE 30 MG/1
30 TABLET, FILM COATED ORAL EVERY 6 HOURS
Status: DISCONTINUED | OUTPATIENT
Start: 2025-02-08 | End: 2025-02-10

## 2025-02-08 RX ORDER — LISINOPRIL 20 MG/1
20 TABLET ORAL DAILY
Status: DISCONTINUED | OUTPATIENT
Start: 2025-02-09 | End: 2025-02-10

## 2025-02-08 RX ORDER — LISINOPRIL 10 MG/1
10 TABLET ORAL ONCE
Status: COMPLETED | OUTPATIENT
Start: 2025-02-08 | End: 2025-02-08

## 2025-02-08 RX ORDER — ACETAMINOPHEN 650 MG/20.3ML
650 LIQUID ORAL EVERY 6 HOURS PRN
Status: DISCONTINUED | OUTPATIENT
Start: 2025-02-08 | End: 2025-02-12 | Stop reason: HOSPADM

## 2025-02-08 RX ORDER — NAPROXEN SODIUM 220 MG/1
81 TABLET, FILM COATED ORAL DAILY
Status: DISCONTINUED | OUTPATIENT
Start: 2025-02-08 | End: 2025-02-10

## 2025-02-08 RX ORDER — FUROSEMIDE 20 MG/1
20 TABLET ORAL DAILY
Status: DISCONTINUED | OUTPATIENT
Start: 2025-02-08 | End: 2025-02-10

## 2025-02-08 RX ADMIN — DEXTROSE MONOHYDRATE 12.5 G: 25 INJECTION, SOLUTION INTRAVENOUS at 01:02

## 2025-02-08 RX ADMIN — LISINOPRIL 10 MG: 10 TABLET ORAL at 02:02

## 2025-02-08 RX ADMIN — HYDROXYCHLOROQUINE SULFATE 200 MG: 200 TABLET, FILM COATED ORAL at 08:02

## 2025-02-08 RX ADMIN — FAMOTIDINE 20 MG: 10 INJECTION, SOLUTION INTRAVENOUS at 10:02

## 2025-02-08 RX ADMIN — DEXTROSE MONOHYDRATE 12.5 G: 25 INJECTION, SOLUTION INTRAVENOUS at 03:02

## 2025-02-08 RX ADMIN — Medication 200 ML: at 06:02

## 2025-02-08 RX ADMIN — DIGOXIN 0.12 MG: 125 TABLET ORAL at 02:02

## 2025-02-08 RX ADMIN — Medication 200 ML: at 09:02

## 2025-02-08 RX ADMIN — METOPROLOL TARTRATE 75 MG: 50 TABLET, FILM COATED ORAL at 08:02

## 2025-02-08 RX ADMIN — FUROSEMIDE 20 MG: 20 TABLET ORAL at 02:02

## 2025-02-08 RX ADMIN — APIXABAN 2.5 MG: 2.5 TABLET, FILM COATED ORAL at 09:02

## 2025-02-08 RX ADMIN — METOROPROLOL TARTRATE 5 MG: 5 INJECTION, SOLUTION INTRAVENOUS at 06:02

## 2025-02-08 RX ADMIN — SENNOSIDES AND DOCUSATE SODIUM 1 TABLET: 8.6; 5 TABLET ORAL at 08:02

## 2025-02-08 RX ADMIN — Medication 200 ML: at 02:02

## 2025-02-08 RX ADMIN — ACETAMINOPHEN 650 MG: 650 SOLUTION ORAL at 10:02

## 2025-02-08 RX ADMIN — ASPIRIN 81 MG CHEWABLE TABLET 81 MG: 81 TABLET CHEWABLE at 02:02

## 2025-02-08 RX ADMIN — CEFTRIAXONE SODIUM 2 G: 2 INJECTION, POWDER, FOR SOLUTION INTRAMUSCULAR; INTRAVENOUS at 10:02

## 2025-02-08 RX ADMIN — ATORVASTATIN CALCIUM 80 MG: 40 TABLET, FILM COATED ORAL at 02:02

## 2025-02-08 RX ADMIN — METOPROLOL TARTRATE 75 MG: 50 TABLET, FILM COATED ORAL at 06:02

## 2025-02-08 RX ADMIN — METOPROLOL TARTRATE 75 MG: 50 TABLET, FILM COATED ORAL at 02:02

## 2025-02-08 RX ADMIN — DEXTROSE MONOHYDRATE 25 G: 25 INJECTION, SOLUTION INTRAVENOUS at 04:02

## 2025-02-08 RX ADMIN — DILTIAZEM HYDROCHLORIDE 30 MG: 30 TABLET, FILM COATED ORAL at 06:02

## 2025-02-08 NOTE — NURSING
Rapid Response Nurse Follow-up Note     Followed up with patient for proactive rounding.   No acute issues at this time. Metoprolol given in am for tachy cardia. Pt -120s. VSS. Reviewed plan of care with charge RNSoo .   Team will continue to follow.  Please call Rapid Response RN, Hieu Tony RN with any questions or concerns at [phone].

## 2025-02-08 NOTE — PROGRESS NOTES
[facility] Medicine  Progress Note    Patient Name: Eneida Majano  MRN: [id number]  Patient Class: IP- Inpatient   Admission Date: 2/3/2025  Length of Stay: 5 days  Attending Physician: Chhaya Vilchis*  Primary Care Provider: Paula Cooper NP        Subjective     Principal Problem:Cerebrovascular accident (CVA) due to embolism of right middle cerebral artery        HPI:  84-year-old female with a history of anemia, atrial fibrillation/flutter (on Eliquis), rheumatoid arthritis, diabetes, gastroesophageal reflux disease, hypertension, hyperlipidemia, and insomnia , pt was transferred to [facility] on January 28 with aphasia/dysarthria, right lateral gaze, and left-sided weakness. CT head showed evidence of a right MCA distribution stroke along with other areas of infarct. CTA of the head and neck showed occlusion of the right M2 branch. She had Vascular Neurology tele-consultation and was felt not to be a thrombolytic candidate. She was also not a thrombectomy candidate with a large core infarct on imaging. MRI brain on 1/29 also showed the areas of infarct. She was admitted with right MCA stroke, atrial flutter/fibrillation with rapid ventricular response, and encephalopathy. Anticoagulation was held due to concern for potential hemorrhagic conversion. Also troponin was elevated and she had echocardiogram that showed EF 28% with decreased right ventricular systolic function and moderate pulmonary hypertension. Mentation improved somewhat by January 31. She was placed on Plavix with plans to resume anticoagulation after 7 days. By February 1, she had increased confusion. She was more tachycardic. On the morning of February 3, tachycardia persisted and she was upgraded in status and started on esmolol infusion. She was seen by Cardiology. On the morning of February 3 she was awake but confused and not following commands. She was unable to swallow oral medicines. Blood  pressure remained stable. Pt was transfer to Hospital Medicine at [facility] in ICU status for continued rate control with esmolol.       On day of transfer to [facility] :  February 3: Sodium 138, potassium 4.1, chloride 108, CO2 19, BUN 13, creatinine 0.8, glucose 125, magnesium 1.9, white blood cells 7.04, hemoglobin 12.8, hematocrit 39.8, platelets 256  -EKG showed atrial fibrillation with ventricular rate 123. T-wave abnormality.    January 31: AST 23, ALT 20  -abdominal ultrasound had no acute findings    January 30: CT head showed redemonstration of acute right MCA infarct. No acute intracranial hemorrhage.  -INR 1.1  -echocardiogram had EF 28%. Unable to assess diastolic function due to atrial fibrillation. Decreased right ventricular systolic function. Right atrium is dilated. Moderate mitral regurgitation. Moderate tricuspid regurgitation. Moderate pulmonary hypertension with estimated PA systolic pressure 60 mmHg. Intermediate venous pressure at mmHg.    January 29: MRI brain had areas of acute infarction in the right MCA distribution largest involving the right perisylvian and lateral right frontal regions and smaller lateral right parieto-occipital cortical infarct with associated localized mass effect including effacement of adjacent portions of the right sylvian fissure and overlying cortical sulci. Non acute posterior left frontal subcortical white matter infarct. Moderate presumed microvascular ischemic change     Overview/Hospital Course:  No notes on file    Interval History: lying in bed, still unresponsive, with unpurposeful movement.   S/p PEG tube placement on 2/7- resume tube feed  BP still uncontrolled - uptitrate meds    Reports unable to control secretion - scopolamine in placement   Urine culture with multiple organism non predominance-deescalate soon      Review of Systems   Unable to perform ROS: Patient unresponsive     Objective:     Vital Signs (Most Recent):  Temp:  "99 °F (37.2 °C) (02/08/25 1154)  Pulse: 98 (02/08/25 1154)  Resp: 18 (02/08/25 1154)  BP: (!) 195/93 (02/08/25 1154)  SpO2: 95 % (02/08/25 0818) Vital Signs (24h Range):  Temp:  [98.2 °F (36.8 °C)-99.8 °F (37.7 °C)] 99 °F (37.2 °C)  Pulse:  [] 98  Resp:  [17-24] 18  SpO2:  [92 %-100 %] 95 %  BP: (102-195)/(58-95) 195/93     Weight: 52.4 kg (115 lb 8.3 oz)  Body mass index is 23.33 kg/m².    Intake/Output Summary (Last 24 hours) at 2/8/2025 1237  Last data filed at 2/7/2025 1422  Gross per 24 hour   Intake 100 ml   Output --   Net 100 ml         Physical Exam  Constitutional:       General: She is not in acute distress.     Appearance: She is ill-appearing. She is not toxic-appearing.      Comments: Very cachectic   HENT:      Head: Normocephalic and atraumatic.   Cardiovascular:      Rate and Rhythm: Normal rate. Rhythm irregular.   Pulmonary:      Effort: Pulmonary effort is normal.      Breath sounds: Normal breath sounds.   Abdominal:      General: Abdomen is flat.      Palpations: Abdomen is soft.   Musculoskeletal:         General: No swelling or tenderness.      Cervical back: Normal range of motion.      Right lower leg: No edema.      Left lower leg: No edema.   Skin:     General: Skin is dry.      Capillary Refill: Capillary refill takes less than 2 seconds.      Coloration: Skin is pale.   Neurological:      Mental Status: She is disoriented.             Significant Labs: A1C:   Recent Labs   Lab 10/01/24  0816 01/29/25  2300   HGBA1C 5.8* 5.7*     ABGs:   No results for input(s): "PH", "PCO2", "HCO3", "POCSATURATED", "BE", "TOTALHB", "COHB", "METHB", "O2HB", "POCFIO2", "PO2" in the last 48 hours.    Blood Culture:   No results for input(s): "LABBLOO" in the last 48 hours.    CBC:   Recent Labs   Lab 02/08/25  0420   WBC 6.62   HGB 11.9*   HCT 38.3        CMP:   Recent Labs   Lab 02/07/25  0454 02/08/25  0420    139  135*   K 4.0 4.2  4.1    105  103   CO2 22* 23  25    " "264*  262*   BUN 18 15  14   CREATININE 0.8 0.9  0.9   CALCIUM 8.7 8.5*  8.5*   ALBUMIN  --  2.3*   ANIONGAP 8 11  7*     Cardiac Markers:   No results for input(s): "CKMB", "MYOGLOBIN", "BNP", "TROPISTAT" in the last 48 hours.    Lactic Acid:   No results for input(s): "LACTATE" in the last 48 hours.    Lipase:   No results for input(s): "LIPASE" in the last 48 hours.    Lipid Panel: No results for input(s): "CHOL", "HDL", "LDLCALC", "TRIG", "CHOLHDL" in the last 48 hours.  Magnesium:   Recent Labs   Lab 02/08/25  0420   MG 1.8     Troponin:   No results for input(s): "TROPONINI", "TROPONINIHS" in the last 48 hours.    TSH:   Recent Labs   Lab 02/03/25  1353   TSH 3.156     Urine Culture:   No results for input(s): "LABURIN" in the last 48 hours.    Urine Studies:   No results for input(s): "COLORU", "APPEARANCEUA", "PHUR", "SPECGRAV", "PROTEINUA", "GLUCUA", "KETONESU", "BILIRUBINUA", "OCCULTUA", "NITRITE", "UROBILINOGEN", "LEUKOCYTESUR", "RBCUA", "WBCUA", "BACTERIA", "SQUAMEPITHEL", "HYALINECASTS" in the last 48 hours.    Invalid input(s): "WRIGHTSUR"      Significant Imaging: I have reviewed all pertinent imaging results/findings within the past 24 hours.    Assessment and Plan     * Cerebrovascular accident (CVA) due to embolism of right middle cerebral artery  Presented on January 28, 2025 with large stroke, patient was not a candidate for tPA versus thrombectomy  Tele stroke was consulted.  The all anticoagulants to be started after in 7 days on February 4th  Neurology consulted at [facility] after arrival on 02/03       Antithrombotics for secondary stroke prevention: Antiplatelets: None:  Large size stroke on 01/28 patient to wait total of 7 days to restart anticoagulants on 2/for    Statins for secondary stroke prevention and hyperlipidemia, if present:   Statins: Atorvastatin- 40 mg daily    Aggressive risk factor modification: HTN, DM, A-Fib, CAD     Rehab efforts: The patient has been evaluated " by a stroke team provider and the therapy needs have been fully considered based off the presenting complaints and exam findings. The following therapy evaluations are needed: PT evaluate and treat, OT evaluate and treat, SLP evaluate and treat    Diagnostics ordered/pending: Carotid ultrasound to assess vasculature, CT scan of head without contrast to asses brain parenchyma, CTA Head to assess vasculature , HgbA1C to assess blood glucose levels, Lipid Profile to assess cholesterol levels, MRA head to assess vasculature, MRA neck/arch to assess vasculature, MRI head without contrast to assess brain parenchyma, TTE to assess cardiac function/status , TSH to assess thyroid function    MRA brain on 01/29  Areas of acute infarction right MCA distribution largest involving the right perisylvian and lateral right frontal regions measuring 4.9 x 2.9 cm and smaller lateral right parietooccipital cortical infarct measuring 2.6 x 2.1 cm with associated localized mass effect including effacement of adjacent portions right sylvian fissure and overlying cortical sulci.     Nonacute posterior left frontal subcortical white matter infarct with small focus overlying cortical involvement.  Moderate presumed microvascular ischemic change white matter.    CT head on 02/03  Evolving right MCA vascular territory infarcts    MRI brain on 2/4  Stable bilateral acute/subacute supratentorial infarcts without significant extension or hemorrhagic conversion.     Background of prominent periventricular white matter changes of chronic microvascular ischemia.    EEG    This is an abnormal EEG during stupor state.  The overall degree of disorganization and slowing for given age is suggestive of a moderate to severe encephalopathy, likely a toxic metabolic encephalopathy.  There is no evidence of an epileptic process on this recording.  No seizures were recorded during this study.   VTE prophylaxis: None: Reason for No Pharmacological VTE  Prophylaxis:  Due to large stroke per Neurology recommendation had [facility] patient to be started after 7 days (2/4    BP parameters: Infarct:  Out of the window at this moment to maintain blood pressure within normal values    UTI (urinary tract infection)  UA positive  Urine cx-   Urine culture with multiple organism non predominance-deescalate soon    Ceftriaxone pending cx      ACP (advance care planning)  Advance Care Planning    Date: 02/04/2025    Code Status  Code status unknown. we agreed to leave patient as full code until code status is confirmed  by family./     A total of 20 min was spent on advance care planning, goals of care discussion, emotional support, formulating and communicating prognosis and exploring burden/benefit of various approaches of treatment. This discussion occurred on a fully voluntary basis with the verbal consent of the patient and/or family.      Advance Care Planning    Date: 02/05/2025  Primary team and pulmonary crit met with patient family at the bedside, updated on clinical status and imaging reports. Also discussed possible disease trajectory, family open to supportive treatment for now to include peg tube placement and possible LTAC placement. Code status also discussed and family wants to continue present treament regimen but make patient DNR in the event she decline clinically.  Questions and concerns were addressed          Code Status  In light of the patients advanced and life limiting illness,I engaged the the family in a voluntary conversation about the patient's preferences for care  at the very end of life. The patient wishes to have a natural, peaceful death.  Along those lines, the patient does not wish to have CPR or other invasive treatments performed when her heart and/or breathing stops. I communicated to the family that a DNR order would be placed in her medical record to reflect this preference.    A total of 15 min was spent on advance care  planning, goals of care discussion, emotional support, formulating and communicating prognosis and exploring burden/benefit of various approaches of treatment. This discussion occurred on a fully voluntary basis with the verbal consent of the patient and/or family.            Acute on chronic heart failure  Cardiology was consulted currently patient is on esmolol drip.  Beta blockers scheduled  Patient is a euvolemic  Patient received loading dose of digoxin for AFib with RVR uncontrolled  Echo Saline Bubble? Yes; Ultrasound enhancing contrast? Yes    Result Date: 1/30/2025    Left Ventricle: The left ventricle is normal in size. Mildly increased   wall thickness. Global hypokinesis present. There is severely reduced   systolic function. Quantitated ejection fraction is 28%. Unable to assess   diastolic function due to atrial fibrillation.    Right Ventricle: Right ventricular enlargement. Systolic function is   reduced.    Left Atrium: Left atrium is severely dilated. Agitated saline study of   the atrial septum is negative after vasalva maneuver, suggesting absence   of intracardiac shunt at the atrial level.    Right Atrium: Right atrium is dilated.    Mitral Valve: There is moderate regurgitation.  EROA 0.31 cm2    Tricuspid Valve: There is moderate regurgitation.    Pulmonary Artery: There is moderate pulmonary hypertension. The   estimated pulmonary artery systolic pressure is 60 mmHg.    IVC/SVC: Intermediate venous pressure at 8 mmHg.         Acute encephalopathy  2/3 at [facility] sent for stat CT head  -TSH T4 ammonia levels  -a neurology consulted-appreciate recs    Repeat MRI brain  unchanged   EEG   This is an abnormal EEG during stupor state.  The overall degree of disorganization and slowing for given age is suggestive of a moderate to severe encephalopathy, likely a toxic metabolic encephalopathy.  There is no evidence of an epileptic process on this recording.  No seizures were recorded during  this study.   Consult GI for PEG tube placement- planned for 2/7    Stroke due to occlusion of right middle cerebral artery  Presented on January 28, 2025 with large stroke, patient was not a candidate for tPA versus thrombectomy  Tele stroke was consulted.  The all anticoagulants to be started after in 7 days on February 4th  Neurology consulted at [facility] after arrival on 02/03       Antithrombotics for secondary stroke prevention: Antiplatelets: None:  Large size stroke on 01/28 patient to wait total of 7 days to restart anticoagulants on 2/for    Statins for secondary stroke prevention and hyperlipidemia, if present:   Statins: Atorvastatin- 40 mg daily    Aggressive risk factor modification: HTN, DM, A-Fib, CAD     Rehab efforts: The patient has been evaluated by a stroke team provider and the therapy needs have been fully considered based off the presenting complaints and exam findings. The following therapy evaluations are needed: PT evaluate and treat, OT evaluate and treat, SLP evaluate and treat    Diagnostics ordered/pending: Carotid ultrasound to assess vasculature, CT scan of head without contrast to asses brain parenchyma, CTA Head to assess vasculature , HgbA1C to assess blood glucose levels, Lipid Profile to assess cholesterol levels, MRA head to assess vasculature, MRA neck/arch to assess vasculature, MRI head without contrast to assess brain parenchyma, TTE to assess cardiac function/status , TSH to assess thyroid function    VTE prophylaxis: None: Reason for No Pharmacological VTE Prophylaxis:  Due to large stroke per Neurology recommendation had [facility] patient to be started after 7 days (2/4    BP parameters: Infarct:  Out of the window at this moment to maintain blood pressure within normal values        Dysphagia  S/p stroke   Consult SLP-appreciate recs   Consult GI for PEG tube placement   S/p peg tube placed on 2/7  Resume tube feed      Atrial fibrillation with  RVR  Patient has paroxysmal (<7 days) atrial fibrillation. Patient is currently in atrial fibrillation. [id number] Score: 5. The patients heart rate in the last 24 hours is as follows:  Pulse  Min: 79  Max: 135     Antiarrhythmics  esmolol 2000 mg in sodium chloride 0.9% 100 mL (20 mg/mL), Continuous, Intravenous  metoprolol injection 5 mg, Every 6 hours PRN, Intravenous  metoprolol tartrate (LOPRESSOR) tablet 50 mg, Every 6 hours, Oral    Anticoagulants  apixaban tablet 2.5 mg, 2 times daily, Oral    Plan  - Replete lytes with a goal of K>4, Mg >2  - Patient is not anticoagulated due to recent history of massive stroke, after clearance from Neurology can consider starting patient on anticoagulated on 2/for  - Patient's afib is currently uncontrolled. Will adjust treatment as follows:  Cardiology consulted currently on esmolol drip, digoxin level than load with digoxin    Wean esmolol drip to metoprolol      Essential hypertension  Patient's blood pressure range in the last 24 hours was: BP  Min: 114/94  Max: 189/116.The patient's inpatient anti-hypertensive regimen is listed below:  Current Antihypertensives  esmolol 2000 mg in sodium chloride 0.9% 100 mL (20 mg/mL), Continuous, Intravenous  labetaloL injection 10 mg, Every 6 hours PRN, Intravenous  metoprolol tartrate (LOPRESSOR) tablet 50 mg, Every 6 hours, Oral    Plan  - BP is controlled, no changes needed to their regimen  - controlled current medications are to control AFib RVR    Gastroesophageal reflux disease  On famotidine       T2DM (type 2 diabetes mellitus)  Patient's FSGs are controlled on current medication regimen.  Last A1c reviewed-   Lab Results   Component Value Date    HGBA1C 5.7 (H) 01/29/2025     Most recent fingerstick glucose reviewed-   Recent Labs   Lab 02/03/25  0300 02/03/25  0537 02/03/25  0814 02/03/25  1244   POCTGLUCOSE 137* 121* 126* 102     Current correctional scale  Low  Maintain anti-hyperglycemic dose as follows-    Antihyperglycemics (From admission, onward)      Start     Stop Route Frequency Ordered    02/03/25 1413  insulin aspart U-100 pen 0-5 Units         -- SubQ Every 6 hours PRN 02/03/25 1313          Hold Oral hypoglycemics while patient is in the hospital.      VTE Risk Mitigation (From admission, onward)           Ordered     apixaban tablet 2.5 mg  2 times daily         02/08/25 1254     IP VTE HIGH RISK PATIENT  Once         02/03/25 1250     Place sequential compression device  Until discontinued         02/03/25 1250                    Discharge Planning   DELORES: 2/10/2025     Code Status: DNR   Medical Readiness for Discharge Date:   Discharge Plan A: Long-term acute care facility (LTAC)   Discharge Delays: None known at this time            Please place Justification for DME        Chhaya Vilchis MD  Department of Hospital Medicine   [facility] - Telemetry

## 2025-02-08 NOTE — SIGNIFICANT EVENT
MET called due to patient with a BG of 50 then < 20. Patient has been NPO since PEG tube placement yesterday  Tube feed yet to be resumed  Plan teated with Dex and repeat   Resume tube feed and uptitrate to a goal.

## 2025-02-08 NOTE — ASSESSMENT & PLAN NOTE
Presented on January 28, 2025 with large stroke, patient was not a candidate for tPA versus thrombectomy  Tele stroke was consulted.  The all anticoagulants to be started after in 7 days on February 4th  Neurology consulted at [facility] after arrival on 02/03       Antithrombotics for secondary stroke prevention: Antiplatelets: None:  Large size stroke on 01/28 patient to wait total of 7 days to restart anticoagulants on 2/for    Statins for secondary stroke prevention and hyperlipidemia, if present:   Statins: Atorvastatin- 40 mg daily    Aggressive risk factor modification: HTN, DM, A-Fib, CAD     Rehab efforts: The patient has been evaluated by a stroke team provider and the therapy needs have been fully considered based off the presenting complaints and exam findings. The following therapy evaluations are needed: PT evaluate and treat, OT evaluate and treat, SLP evaluate and treat    Diagnostics ordered/pending: Carotid ultrasound to assess vasculature, CT scan of head without contrast to asses brain parenchyma, CTA Head to assess vasculature , HgbA1C to assess blood glucose levels, Lipid Profile to assess cholesterol levels, MRA head to assess vasculature, MRA neck/arch to assess vasculature, MRI head without contrast to assess brain parenchyma, TTE to assess cardiac function/status , TSH to assess thyroid function    MRA brain on 01/29  Areas of acute infarction right MCA distribution largest involving the right perisylvian and lateral right frontal regions measuring 4.9 x 2.9 cm and smaller lateral right parietooccipital cortical infarct measuring 2.6 x 2.1 cm with associated localized mass effect including effacement of adjacent portions right sylvian fissure and overlying cortical sulci.     Nonacute posterior left frontal subcortical white matter infarct with small focus overlying cortical involvement.  Moderate presumed microvascular ischemic change white matter.    CT head on 02/03  Evolving right  MCA vascular territory infarcts    MRI brain on 2/4  Stable bilateral acute/subacute supratentorial infarcts without significant extension or hemorrhagic conversion.     Background of prominent periventricular white matter changes of chronic microvascular ischemia.    EEG    This is an abnormal EEG during stupor state.  The overall degree of disorganization and slowing for given age is suggestive of a moderate to severe encephalopathy, likely a toxic metabolic encephalopathy.  There is no evidence of an epileptic process on this recording.  No seizures were recorded during this study.   VTE prophylaxis: None: Reason for No Pharmacological VTE Prophylaxis:  Due to large stroke per Neurology recommendation had [facility] patient to be started after 7 days (2/4    BP parameters: Infarct:  Out of the window at this moment to maintain blood pressure within normal values

## 2025-02-08 NOTE — ASSESSMENT & PLAN NOTE
UA positive  Urine cx-   Urine culture with multiple organism non predominance-deescalate soon    Ceftriaxone pending cx

## 2025-02-08 NOTE — SUBJECTIVE & OBJECTIVE
Interval History: lying in bed, still unresponsive, with unpurposeful movement.   S/p PEG tube placement on 2/7- resume tube feed  BP still uncontrolled - uptitrate meds    Reports unable to control secretion - scopolamine in placement   Urine culture with multiple organism non predominance-deescalate soon      Review of Systems   Unable to perform ROS: Patient unresponsive     Objective:     Vital Signs (Most Recent):  Temp: 99 °F (37.2 °C) (02/08/25 1154)  Pulse: 98 (02/08/25 1154)  Resp: 18 (02/08/25 1154)  BP: (!) 195/93 (02/08/25 1154)  SpO2: 95 % (02/08/25 0818) Vital Signs (24h Range):  Temp:  [98.2 °F (36.8 °C)-99.8 °F (37.7 °C)] 99 °F (37.2 °C)  Pulse:  [] 98  Resp:  [17-24] 18  SpO2:  [92 %-100 %] 95 %  BP: (102-195)/(58-95) 195/93     Weight: 52.4 kg (115 lb 8.3 oz)  Body mass index is 23.33 kg/m².    Intake/Output Summary (Last 24 hours) at 2/8/2025 1237  Last data filed at 2/7/2025 1422  Gross per 24 hour   Intake 100 ml   Output --   Net 100 ml         Physical Exam  Constitutional:       General: She is not in acute distress.     Appearance: She is ill-appearing. She is not toxic-appearing.      Comments: Very cachectic   HENT:      Head: Normocephalic and atraumatic.   Cardiovascular:      Rate and Rhythm: Normal rate. Rhythm irregular.   Pulmonary:      Effort: Pulmonary effort is normal.      Breath sounds: Normal breath sounds.   Abdominal:      General: Abdomen is flat.      Palpations: Abdomen is soft.   Musculoskeletal:         General: No swelling or tenderness.      Cervical back: Normal range of motion.      Right lower leg: No edema.      Left lower leg: No edema.   Skin:     General: Skin is dry.      Capillary Refill: Capillary refill takes less than 2 seconds.      Coloration: Skin is pale.   Neurological:      Mental Status: She is disoriented.             Significant Labs: A1C:   Recent Labs   Lab 10/01/24  0816 01/29/25  2300   HGBA1C 5.8* 5.7*     ABGs:   No results for  "input(s): "PH", "PCO2", "HCO3", "POCSATURATED", "BE", "TOTALHB", "COHB", "METHB", "O2HB", "POCFIO2", "PO2" in the last 48 hours.    Blood Culture:   No results for input(s): "LABBLOO" in the last 48 hours.    CBC:   Recent Labs   Lab 02/08/25  0420   WBC 6.62   HGB 11.9*   HCT 38.3        CMP:   Recent Labs   Lab 02/07/25  0454 02/08/25  0420    139  135*   K 4.0 4.2  4.1    105  103   CO2 22* 23  25    264*  262*   BUN 18 15  14   CREATININE 0.8 0.9  0.9   CALCIUM 8.7 8.5*  8.5*   ALBUMIN  --  2.3*   ANIONGAP 8 11  7*     Cardiac Markers:   No results for input(s): "CKMB", "MYOGLOBIN", "BNP", "TROPISTAT" in the last 48 hours.    Lactic Acid:   No results for input(s): "LACTATE" in the last 48 hours.    Lipase:   No results for input(s): "LIPASE" in the last 48 hours.    Lipid Panel: No results for input(s): "CHOL", "HDL", "LDLCALC", "TRIG", "CHOLHDL" in the last 48 hours.  Magnesium:   Recent Labs   Lab 02/08/25  0420   MG 1.8     Troponin:   No results for input(s): "TROPONINI", "TROPONINIHS" in the last 48 hours.    TSH:   Recent Labs   Lab 02/03/25  1353   TSH 3.156     Urine Culture:   No results for input(s): "LABURIN" in the last 48 hours.    Urine Studies:   No results for input(s): "COLORU", "APPEARANCEUA", "PHUR", "SPECGRAV", "PROTEINUA", "GLUCUA", "KETONESU", "BILIRUBINUA", "OCCULTUA", "NITRITE", "UROBILINOGEN", "LEUKOCYTESUR", "RBCUA", "WBCUA", "BACTERIA", "SQUAMEPITHEL", "HYALINECASTS" in the last 48 hours.    Invalid input(s): "LEVI"      Significant Imaging: I have reviewed all pertinent imaging results/findings within the past 24 hours.  "

## 2025-02-08 NOTE — ASSESSMENT & PLAN NOTE
S/p stroke   Consult SLP-appreciate recs   Consult GI for PEG tube placement   S/p peg tube placed on 2/7  Resume tube feed

## 2025-02-08 NOTE — PLAN OF CARE
0306 POCT glucose- 58. Pt sweating and waking up on minor stimulation.  0316 IV Dextrose 50% 12.5 g given.     0344 POCT glucose- 24.  0349 POCT glucose rechecked- 35. ALIRIO Powell notified.  0408 IV Dextrose 50% 25 g given.   0442 POCT glucose rechecked- 134    Informed by monitor tech that HR increased to 160-170s; non-sustained throughout night and was A fib with RVR. V/S per flowsheet. ALIRIO Powell made aware. IV Metoprolol 5 mg given at 0604.     0645 /m /88  Will continue to monitor.    Problem: Adult Inpatient Plan of Care  Goal: Plan of Care Review  2/8/2025 0539 by Tisha Mckeon RN  Outcome: Progressing     Problem: Stroke, Ischemic (Includes Transient Ischemic Attack)  Goal: Effective Oxygenation and Ventilation  2/8/2025 0539 by Tisha Mckeon, RN  Outcome: Progressing

## 2025-02-08 NOTE — PROGRESS NOTES
MET called over head; cued into room via [employer].  See MET flowsheet; all info documented received per VN observation.       02/08/25 1342   Significant Events This Shift   Significant Events Rapid Response Team call   Virtual Nurse - Description of Significant Event low blood glucose   Safety/Activity   Patient Rounds visualized patient   Positioning   Body Position supine   Head of Bed (HOB) Positioning HOB at 30-45 degrees

## 2025-02-08 NOTE — NURSING
RAPID RESPONSE NURSE PROACTIVE ROUNDING NOTE       Time of Visit: 1326    Admit Date: 2/3/2025  LOS: 5  Code Status: DNR   Date of Visit: 2025  : 1940  Age: 84 y.o.  Sex: female  Race: Black or   Bed: K456/K456 A:   MRN: [id number]  Was the patient discharged from an ICU this admission? Yes   Was the patient discharged from a PACU within last 24 hours? Yes   Did the patient receive conscious sedation/general anesthesia in last 24 hours? No   Was the patient in the ED within the past 24 hours? No   Was the patient on NIPPV within the past 24 hours? No   Attending Physician: Chhaya Vilchis  Primary Service: Hospitalist,Hospice and Palliative Medicine,Family Medicine   Time spent at the bedside: 15 -30 min    SITUATION    Notified by overhead page  Reason for alert: MET- hypoglycemia     Diagnosis: Cerebrovascular accident (CVA) due to embolism of right middle cerebral artery   has a past medical history of Acid reflux, Anemia, Arthritis, Atrial fibrillation, Carpal tunnel syndrome, Cataract, Dry mouth, GERD (gastroesophageal reflux disease), Hyperlipidemia, Hypertension, Insomnia, Other osteoporosis without current pathological fracture, and PONV (postoperative nausea and vomiting).    Last Vitals:  Temp: 99 °F (37.2 °C) ( 1154)  Pulse: 99 ( 1330)  Resp: 18 ( 1154)  BP: 195/93 ( 1154)  SpO2: 95 % ( 0818)    24 Hour Vitals Range:  Temp:  [98.2 °F (36.8 °C)-99.8 °F (37.7 °C)]   Pulse:  []   Resp:  [17-24]   BP: (102-195)/(58-95)   SpO2:  [92 %-100 %]     Clinical Issues: Neuro    ASSESSMENT/INTERVENTIONS    Responded to overhead MET page. Bedside RN reported that pt's BG was 50. 12.5g of D50 administered and recheck BG was 20 . Another 12.5g of D50 administered and recheck BG was < 20. MET called overhead. This RN administered another 12.5g of D50; recheck BG was 130. Tube feeds restarted.     RECOMMENDATIONS  Monitor BG  Monitor Neurological  status    Discussed plan of care with charge RNSoo    PROVIDER ESCALATION    Physician escalation: Yes    Orders received and case discussed with Dr. Henry .    Disposition:Remain in room 456    FOLLOW UP    Call back the Rapid Response NurseHieu at [phone] for additional questions or concerns.

## 2025-02-09 LAB
ANION GAP SERPL CALC-SCNC: 5 MMOL/L (ref 8–16)
BACTERIA BLD CULT: NORMAL
BACTERIA BLD CULT: NORMAL
BUN SERPL-MCNC: 12 MG/DL (ref 8–23)
CALCIUM SERPL-MCNC: 8.7 MG/DL (ref 8.7–10.5)
CHLORIDE SERPL-SCNC: 105 MMOL/L (ref 95–110)
CO2 SERPL-SCNC: 28 MMOL/L (ref 23–29)
CREAT SERPL-MCNC: 0.7 MG/DL (ref 0.5–1.4)
EST. GFR  (NO RACE VARIABLE): >60 ML/MIN/1.73 M^2
GLUCOSE SERPL-MCNC: 142 MG/DL (ref 70–110)
PHOSPHATE SERPL-MCNC: 2 MG/DL (ref 2.7–4.5)
POCT GLUCOSE: 90 MG/DL (ref 70–110)
POCT GLUCOSE: 90 MG/DL (ref 70–110)
POCT GLUCOSE: 92 MG/DL (ref 70–110)
POCT GLUCOSE: 98 MG/DL (ref 70–110)
POTASSIUM SERPL-SCNC: 3.8 MMOL/L (ref 3.5–5.1)
SODIUM SERPL-SCNC: 138 MMOL/L (ref 136–145)

## 2025-02-09 PROCEDURE — 63600175 PHARM REV CODE 636 W HCPCS: Performed by: INTERNAL MEDICINE

## 2025-02-09 PROCEDURE — 25000003 PHARM REV CODE 250: Performed by: INTERNAL MEDICINE

## 2025-02-09 PROCEDURE — 25000003 PHARM REV CODE 250: Performed by: FAMILY MEDICINE

## 2025-02-09 PROCEDURE — 27000221 HC OXYGEN, UP TO 24 HOURS

## 2025-02-09 PROCEDURE — 25000003 PHARM REV CODE 250: Performed by: STUDENT IN AN ORGANIZED HEALTH CARE EDUCATION/TRAINING PROGRAM

## 2025-02-09 PROCEDURE — 25000003 PHARM REV CODE 250: Performed by: NURSE PRACTITIONER

## 2025-02-09 PROCEDURE — 94761 N-INVAS EAR/PLS OXIMETRY MLT: CPT

## 2025-02-09 PROCEDURE — 25000003 PHARM REV CODE 250

## 2025-02-09 PROCEDURE — 63600175 PHARM REV CODE 636 W HCPCS: Performed by: FAMILY MEDICINE

## 2025-02-09 PROCEDURE — 11000001 HC ACUTE MED/SURG PRIVATE ROOM

## 2025-02-09 PROCEDURE — 84100 ASSAY OF PHOSPHORUS: CPT

## 2025-02-09 PROCEDURE — 80048 BASIC METABOLIC PNL TOTAL CA: CPT

## 2025-02-09 PROCEDURE — 36415 COLL VENOUS BLD VENIPUNCTURE: CPT

## 2025-02-09 PROCEDURE — 99900035 HC TECH TIME PER 15 MIN (STAT)

## 2025-02-09 RX ADMIN — ATORVASTATIN CALCIUM 80 MG: 40 TABLET, FILM COATED ORAL at 11:02

## 2025-02-09 RX ADMIN — DILTIAZEM HYDROCHLORIDE 30 MG: 30 TABLET, FILM COATED ORAL at 06:02

## 2025-02-09 RX ADMIN — APIXABAN 2.5 MG: 2.5 TABLET, FILM COATED ORAL at 10:02

## 2025-02-09 RX ADMIN — SENNOSIDES AND DOCUSATE SODIUM 1 TABLET: 8.6; 5 TABLET ORAL at 09:02

## 2025-02-09 RX ADMIN — FAMOTIDINE 20 MG: 10 INJECTION, SOLUTION INTRAVENOUS at 10:02

## 2025-02-09 RX ADMIN — CEFTRIAXONE SODIUM 2 G: 2 INJECTION, POWDER, FOR SOLUTION INTRAMUSCULAR; INTRAVENOUS at 10:02

## 2025-02-09 RX ADMIN — Medication 200 ML: at 01:02

## 2025-02-09 RX ADMIN — Medication 100 ML: at 06:02

## 2025-02-09 RX ADMIN — DILTIAZEM HYDROCHLORIDE 30 MG: 30 TABLET, FILM COATED ORAL at 12:02

## 2025-02-09 RX ADMIN — METOPROLOL TARTRATE 75 MG: 50 TABLET, FILM COATED ORAL at 12:02

## 2025-02-09 RX ADMIN — METOPROLOL TARTRATE 75 MG: 50 TABLET, FILM COATED ORAL at 10:02

## 2025-02-09 RX ADMIN — FUROSEMIDE 20 MG: 20 TABLET ORAL at 10:02

## 2025-02-09 RX ADMIN — DIGOXIN 0.12 MG: 125 TABLET ORAL at 12:02

## 2025-02-09 RX ADMIN — HYDROXYCHLOROQUINE SULFATE 200 MG: 200 TABLET, FILM COATED ORAL at 10:02

## 2025-02-09 RX ADMIN — HYDROXYZINE PAMOATE 25 MG: 25 CAPSULE ORAL at 12:02

## 2025-02-09 RX ADMIN — METOPROLOL TARTRATE 75 MG: 50 TABLET, FILM COATED ORAL at 06:02

## 2025-02-09 RX ADMIN — SCOPOLAMINE 1 PATCH: 1.5 PATCH, EXTENDED RELEASE TRANSDERMAL at 02:02

## 2025-02-09 RX ADMIN — ASPIRIN 81 MG CHEWABLE TABLET 81 MG: 81 TABLET CHEWABLE at 10:02

## 2025-02-09 RX ADMIN — APIXABAN 2.5 MG: 2.5 TABLET, FILM COATED ORAL at 09:02

## 2025-02-09 RX ADMIN — Medication 200 ML: at 05:02

## 2025-02-09 RX ADMIN — HALOPERIDOL LACTATE 1 MG: 5 INJECTION, SOLUTION INTRAMUSCULAR at 01:02

## 2025-02-09 RX ADMIN — SENNOSIDES AND DOCUSATE SODIUM 1 TABLET: 8.6; 5 TABLET ORAL at 11:02

## 2025-02-09 RX ADMIN — LISINOPRIL 20 MG: 20 TABLET ORAL at 10:02

## 2025-02-09 RX ADMIN — Medication 200 ML: at 02:02

## 2025-02-09 RX ADMIN — HYDROXYCHLOROQUINE SULFATE 200 MG: 200 TABLET, FILM COATED ORAL at 09:02

## 2025-02-09 RX ADMIN — DILTIAZEM HYDROCHLORIDE 30 MG: 30 TABLET, FILM COATED ORAL at 11:02

## 2025-02-09 RX ADMIN — DILTIAZEM HYDROCHLORIDE 30 MG: 30 TABLET, FILM COATED ORAL at 05:02

## 2025-02-09 RX ADMIN — Medication 200 ML: at 09:02

## 2025-02-09 NOTE — PROGRESS NOTES
[facility] Medicine  Progress Note    Patient Name: Eneida Majano  MRN: [id number]  Patient Class: IP- Inpatient   Admission Date: 2/3/2025  Length of Stay: 6 days  Attending Physician: Chhaya Vilchis*  Primary Care Provider: Paula Cooper NP        Subjective     Principal Problem:Cerebrovascular accident (CVA) due to embolism of right middle cerebral artery        HPI:  84-year-old female with a history of anemia, atrial fibrillation/flutter (on Eliquis), rheumatoid arthritis, diabetes, gastroesophageal reflux disease, hypertension, hyperlipidemia, and insomnia , pt was transferred to [facility] on January 28 with aphasia/dysarthria, right lateral gaze, and left-sided weakness. CT head showed evidence of a right MCA distribution stroke along with other areas of infarct. CTA of the head and neck showed occlusion of the right M2 branch. She had Vascular Neurology tele-consultation and was felt not to be a thrombolytic candidate. She was also not a thrombectomy candidate with a large core infarct on imaging. MRI brain on 1/29 also showed the areas of infarct. She was admitted with right MCA stroke, atrial flutter/fibrillation with rapid ventricular response, and encephalopathy. Anticoagulation was held due to concern for potential hemorrhagic conversion. Also troponin was elevated and she had echocardiogram that showed EF 28% with decreased right ventricular systolic function and moderate pulmonary hypertension. Mentation improved somewhat by January 31. She was placed on Plavix with plans to resume anticoagulation after 7 days. By February 1, she had increased confusion. She was more tachycardic. On the morning of February 3, tachycardia persisted and she was upgraded in status and started on esmolol infusion. She was seen by Cardiology. On the morning of February 3 she was awake but confused and not following commands. She was unable to swallow oral medicines. Blood  pressure remained stable. Pt was transfer to Hospital Medicine at [facility] in ICU status for continued rate control with esmolol.       On day of transfer to [facility] :  February 3: Sodium 138, potassium 4.1, chloride 108, CO2 19, BUN 13, creatinine 0.8, glucose 125, magnesium 1.9, white blood cells 7.04, hemoglobin 12.8, hematocrit 39.8, platelets 256  -EKG showed atrial fibrillation with ventricular rate 123. T-wave abnormality.    January 31: AST 23, ALT 20  -abdominal ultrasound had no acute findings    January 30: CT head showed redemonstration of acute right MCA infarct. No acute intracranial hemorrhage.  -INR 1.1  -echocardiogram had EF 28%. Unable to assess diastolic function due to atrial fibrillation. Decreased right ventricular systolic function. Right atrium is dilated. Moderate mitral regurgitation. Moderate tricuspid regurgitation. Moderate pulmonary hypertension with estimated PA systolic pressure 60 mmHg. Intermediate venous pressure at mmHg.    January 29: MRI brain had areas of acute infarction in the right MCA distribution largest involving the right perisylvian and lateral right frontal regions and smaller lateral right parieto-occipital cortical infarct with associated localized mass effect including effacement of adjacent portions of the right sylvian fissure and overlying cortical sulci. Non acute posterior left frontal subcortical white matter infarct. Moderate presumed microvascular ischemic change     Overview/Hospital Course:  No notes on file    Interval History: lying in bed, still unresponsive, family at the bedside reported patient was speaking unintelligible words..   S/p PEG tube placement on 2/7- resume tube feed  BP still uncontrolled - uptitrate meds    Urine culture with multiple organism non predominance-deescalate soon      Review of Systems   Unable to perform ROS: Patient unresponsive     Objective:     Vital Signs (Most Recent):  Temp: 96.7 °F (35.9 °C)  "(02/09/25 0829)  Pulse: (!) 18 (02/09/25 0927)  Resp: 18 (02/09/25 0927)  BP: 134/88 (02/09/25 0927)  SpO2: 96 % (02/09/25 0828) Vital Signs (24h Range):  Temp:  [96.7 °F (35.9 °C)-99 °F (37.2 °C)] 96.7 °F (35.9 °C)  Pulse:  [] 18  Resp:  [17-20] 18  SpO2:  [95 %-98 %] 96 %  BP: (120-204)/(60-93) 134/88     Weight: 52.4 kg (115 lb 8.3 oz)  Body mass index is 23.33 kg/m².    Intake/Output Summary (Last 24 hours) at 2/9/2025 1108  Last data filed at 2/9/2025 0517  Gross per 24 hour   Intake 864 ml   Output --   Net 864 ml         Physical Exam  Constitutional:       General: She is not in acute distress.     Appearance: She is ill-appearing. She is not toxic-appearing.      Comments: Very cachectic   HENT:      Head: Normocephalic and atraumatic.   Cardiovascular:      Rate and Rhythm: Normal rate. Rhythm irregular.   Pulmonary:      Effort: Pulmonary effort is normal.      Breath sounds: Normal breath sounds.   Abdominal:      General: Abdomen is flat.      Palpations: Abdomen is soft.   Musculoskeletal:         General: No swelling or tenderness.      Cervical back: Normal range of motion.      Right lower leg: No edema.      Left lower leg: No edema.   Skin:     General: Skin is dry.      Capillary Refill: Capillary refill takes less than 2 seconds.      Coloration: Skin is pale.   Neurological:      Mental Status: She is disoriented.             Significant Labs: A1C:   Recent Labs   Lab 10/01/24  0816 01/29/25  2300   HGBA1C 5.8* 5.7*     ABGs:   No results for input(s): "PH", "PCO2", "HCO3", "POCSATURATED", "BE", "TOTALHB", "COHB", "METHB", "O2HB", "POCFIO2", "PO2" in the last 48 hours.    Blood Culture:   No results for input(s): "LABBLOO" in the last 48 hours.    CBC:   Recent Labs   Lab 02/08/25  0420   WBC 6.62   HGB 11.9*   HCT 38.3        CMP:   Recent Labs   Lab 02/08/25  0420 02/08/25  1345 02/09/25  0804     135* 136 138   K 4.2  4.1 4.1 3.8     103 104 105   CO2 23  25 23 " "28   *  262* 322* 142*   BUN 15  14 13 12   CREATININE 0.9  0.9 0.8 0.7   CALCIUM 8.5*  8.5* 8.5* 8.7   ALBUMIN 2.3*  --   --    ANIONGAP 11  7* 9 5*     Cardiac Markers:   No results for input(s): "CKMB", "MYOGLOBIN", "BNP", "TROPISTAT" in the last 48 hours.    Lactic Acid:   No results for input(s): "LACTATE" in the last 48 hours.    Lipase:   No results for input(s): "LIPASE" in the last 48 hours.    Lipid Panel: No results for input(s): "CHOL", "HDL", "LDLCALC", "TRIG", "CHOLHDL" in the last 48 hours.  Magnesium:   Recent Labs   Lab 02/08/25  0420   MG 1.8     Troponin:   No results for input(s): "TROPONINI", "TROPONINIHS" in the last 48 hours.    TSH:   Recent Labs   Lab 02/03/25  1353   TSH 3.156     Urine Culture:   No results for input(s): "LABURIN" in the last 48 hours.    Urine Studies:   No results for input(s): "COLORU", "APPEARANCEUA", "PHUR", "SPECGRAV", "PROTEINUA", "GLUCUA", "KETONESU", "BILIRUBINUA", "OCCULTUA", "NITRITE", "UROBILINOGEN", "LEUKOCYTESUR", "RBCUA", "WBCUA", "BACTERIA", "SQUAMEPITHEL", "HYALINECASTS" in the last 48 hours.    Invalid input(s): "WRIGHTSUR"      Significant Imaging: I have reviewed all pertinent imaging results/findings within the past 24 hours.    Assessment and Plan     * Cerebrovascular accident (CVA) due to embolism of right middle cerebral artery  Presented on January 28, 2025 with large stroke, patient was not a candidate for tPA versus thrombectomy  Tele stroke was consulted.  The all anticoagulants to be started after in 7 days on February 4th  Neurology consulted at [facility] after arrival on 02/03       Antithrombotics for secondary stroke prevention: Antiplatelets: None:  Large size stroke on 01/28 patient to wait total of 7 days to restart anticoagulants on 2/for    Statins for secondary stroke prevention and hyperlipidemia, if present:   Statins: Atorvastatin- 40 mg daily    Aggressive risk factor modification: HTN, DM, A-Fib, CAD     Rehab " efforts: The patient has been evaluated by a stroke team provider and the therapy needs have been fully considered based off the presenting complaints and exam findings. The following therapy evaluations are needed: PT evaluate and treat, OT evaluate and treat, SLP evaluate and treat    Diagnostics ordered/pending: Carotid ultrasound to assess vasculature, CT scan of head without contrast to asses brain parenchyma, CTA Head to assess vasculature , HgbA1C to assess blood glucose levels, Lipid Profile to assess cholesterol levels, MRA head to assess vasculature, MRA neck/arch to assess vasculature, MRI head without contrast to assess brain parenchyma, TTE to assess cardiac function/status , TSH to assess thyroid function    MRA brain on 01/29  Areas of acute infarction right MCA distribution largest involving the right perisylvian and lateral right frontal regions measuring 4.9 x 2.9 cm and smaller lateral right parietooccipital cortical infarct measuring 2.6 x 2.1 cm with associated localized mass effect including effacement of adjacent portions right sylvian fissure and overlying cortical sulci.     Nonacute posterior left frontal subcortical white matter infarct with small focus overlying cortical involvement.  Moderate presumed microvascular ischemic change white matter.    CT head on 02/03  Evolving right MCA vascular territory infarcts    MRI brain on 2/4  Stable bilateral acute/subacute supratentorial infarcts without significant extension or hemorrhagic conversion.     Background of prominent periventricular white matter changes of chronic microvascular ischemia.    EEG    This is an abnormal EEG during stupor state.  The overall degree of disorganization and slowing for given age is suggestive of a moderate to severe encephalopathy, likely a toxic metabolic encephalopathy.  There is no evidence of an epileptic process on this recording.  No seizures were recorded during this study.   VTE prophylaxis: None:  Reason for No Pharmacological VTE Prophylaxis:  Due to large stroke per Neurology recommendation had [facility] patient to be started after 7 days (2/4    BP parameters: Infarct:  Out of the window at this moment to maintain blood pressure within normal values    UTI (urinary tract infection)  UA positive  Urine cx-   Urine culture with multiple organism non predominance-deescalate soon    Ceftriaxone deescalate soon      ACP (advance care planning)  Advance Care Planning    Date: 02/04/2025    Code Status  Code status unknown. we agreed to leave patient as full code until code status is confirmed  by family./     A total of 20 min was spent on advance care planning, goals of care discussion, emotional support, formulating and communicating prognosis and exploring burden/benefit of various approaches of treatment. This discussion occurred on a fully voluntary basis with the verbal consent of the patient and/or family.      Advance Care Planning    Date: 02/05/2025  Primary team and pulmonary crit met with patient family at the bedside, updated on clinical status and imaging reports. Also discussed possible disease trajectory, family open to supportive treatment for now to include peg tube placement and possible LTAC placement. Code status also discussed and family wants to continue present treament regimen but make patient DNR in the event she decline clinically.  Questions and concerns were addressed          Code Status  In light of the patients advanced and life limiting illness,I engaged the the family in a voluntary conversation about the patient's preferences for care  at the very end of life. The patient wishes to have a natural, peaceful death.  Along those lines, the patient does not wish to have CPR or other invasive treatments performed when her heart and/or breathing stops. I communicated to the family that a DNR order would be placed in her medical record to reflect this preference.    A  total of 15 min was spent on advance care planning, goals of care discussion, emotional support, formulating and communicating prognosis and exploring burden/benefit of various approaches of treatment. This discussion occurred on a fully voluntary basis with the verbal consent of the patient and/or family.            Acute on chronic heart failure  Cardiology was consulted currently patient is on esmolol drip.  Beta blockers scheduled  Patient is a euvolemic  Patient received loading dose of digoxin for AFib with RVR uncontrolled  Echo Saline Bubble? Yes; Ultrasound enhancing contrast? Yes    Result Date: 1/30/2025    Left Ventricle: The left ventricle is normal in size. Mildly increased   wall thickness. Global hypokinesis present. There is severely reduced   systolic function. Quantitated ejection fraction is 28%. Unable to assess   diastolic function due to atrial fibrillation.    Right Ventricle: Right ventricular enlargement. Systolic function is   reduced.    Left Atrium: Left atrium is severely dilated. Agitated saline study of   the atrial septum is negative after vasalva maneuver, suggesting absence   of intracardiac shunt at the atrial level.    Right Atrium: Right atrium is dilated.    Mitral Valve: There is moderate regurgitation.  EROA 0.31 cm2    Tricuspid Valve: There is moderate regurgitation.    Pulmonary Artery: There is moderate pulmonary hypertension. The   estimated pulmonary artery systolic pressure is 60 mmHg.    IVC/SVC: Intermediate venous pressure at 8 mmHg.         Acute encephalopathy  2/3 at [facility] sent for stat CT head  -TSH T4 ammonia levels  -a neurology consulted-appreciate recs    Repeat MRI brain  unchanged   EEG   This is an abnormal EEG during stupor state.  The overall degree of disorganization and slowing for given age is suggestive of a moderate to severe encephalopathy, likely a toxic metabolic encephalopathy.  There is no evidence of an epileptic process on this  recording.  No seizures were recorded during this study.   Consult GI for PEG tube placement- planned for 2/7    Stroke due to occlusion of right middle cerebral artery  Presented on January 28, 2025 with large stroke, patient was not a candidate for tPA versus thrombectomy  Tele stroke was consulted.  The all anticoagulants to be started after in 7 days on February 4th  Neurology consulted at [facility] after arrival on 02/03       Antithrombotics for secondary stroke prevention: Antiplatelets: None:  Large size stroke on 01/28 patient to wait total of 7 days to restart anticoagulants on 2/for    Statins for secondary stroke prevention and hyperlipidemia, if present:   Statins: Atorvastatin- 40 mg daily    Aggressive risk factor modification: HTN, DM, A-Fib, CAD     Rehab efforts: The patient has been evaluated by a stroke team provider and the therapy needs have been fully considered based off the presenting complaints and exam findings. The following therapy evaluations are needed: PT evaluate and treat, OT evaluate and treat, SLP evaluate and treat    Diagnostics ordered/pending: Carotid ultrasound to assess vasculature, CT scan of head without contrast to asses brain parenchyma, CTA Head to assess vasculature , HgbA1C to assess blood glucose levels, Lipid Profile to assess cholesterol levels, MRA head to assess vasculature, MRA neck/arch to assess vasculature, MRI head without contrast to assess brain parenchyma, TTE to assess cardiac function/status , TSH to assess thyroid function    VTE prophylaxis: None: Reason for No Pharmacological VTE Prophylaxis:  Due to large stroke per Neurology recommendation had [facility] patient to be started after 7 days (2/4    BP parameters: Infarct:  Out of the window at this moment to maintain blood pressure within normal values        Dysphagia  S/p stroke   Consult SLP-appreciate recs   Consult GI for PEG tube placement   S/p peg tube placed on 2/7  Resume  tube feed      Atrial fibrillation with RVR  Patient has paroxysmal (<7 days) atrial fibrillation. Patient is currently in atrial fibrillation. [id number] Score: 5. The patients heart rate in the last 24 hours is as follows:  Pulse  Min: 79  Max: 135     Antiarrhythmics  esmolol 2000 mg in sodium chloride 0.9% 100 mL (20 mg/mL), Continuous, Intravenous  metoprolol injection 5 mg, Every 6 hours PRN, Intravenous  metoprolol tartrate (LOPRESSOR) tablet 50 mg, Every 6 hours, Oral    Anticoagulants  apixaban tablet 2.5 mg, 2 times daily, Oral    Plan  - Replete lytes with a goal of K>4, Mg >2  - Patient is not anticoagulated due to recent history of massive stroke, after clearance from Neurology can consider starting patient on anticoagulated on 2/for  - Patient's afib is currently uncontrolled. Will adjust treatment as follows:  Cardiology consulted currently on esmolol drip, digoxin level than load with digoxin    Wean esmolol drip to metoprolol      Essential hypertension  Patient's blood pressure range in the last 24 hours was: BP  Min: 114/94  Max: 189/116.The patient's inpatient anti-hypertensive regimen is listed below:  Current Antihypertensives  esmolol 2000 mg in sodium chloride 0.9% 100 mL (20 mg/mL), Continuous, Intravenous  labetaloL injection 10 mg, Every 6 hours PRN, Intravenous  metoprolol tartrate (LOPRESSOR) tablet 50 mg, Every 6 hours, Oral    Plan  - BP is controlled, no changes needed to their regimen  - controlled current medications are to control AFib RVR    Gastroesophageal reflux disease  On famotidine       T2DM (type 2 diabetes mellitus)  Patient's FSGs are controlled on current medication regimen.  Last A1c reviewed-   Lab Results   Component Value Date    HGBA1C 5.7 (H) 01/29/2025     Most recent fingerstick glucose reviewed-   Recent Labs   Lab 02/03/25  0300 02/03/25  0537 02/03/25  0814 02/03/25  1244   POCTGLUCOSE 137* 121* 126* 102     Current correctional scale  Low  Maintain  anti-hyperglycemic dose as follows-   Antihyperglycemics (From admission, onward)      Start     Stop Route Frequency Ordered    02/03/25 1413  insulin aspart U-100 pen 0-5 Units         -- SubQ Every 6 hours PRN 02/03/25 1313          Hold Oral hypoglycemics while patient is in the hospital.      VTE Risk Mitigation (From admission, onward)           Ordered     apixaban tablet 2.5 mg  2 times daily         02/08/25 1254     IP VTE HIGH RISK PATIENT  Once         02/03/25 1250     Place sequential compression device  Until discontinued         02/03/25 1250                    Discharge Planning   DELORES: 2/10/2025     Code Status: DNR   Medical Readiness for Discharge Date:   Discharge Plan A: Long-term acute care facility (LTAC)   Discharge Delays: None known at this time            Please place Justification for DME        Chhaya Vilchis MD  Department of Hospital Medicine   [place] - Telemetry

## 2025-02-09 NOTE — NURSING
Pt given Vistaril 25 mg PP for anxiety as she continues to pull off NC and  telemetry leads despite family at BS pt is restless and anxious and has no way of communicating needs

## 2025-02-09 NOTE — NURSING
Haldol is not effective pt remains agitated pulling at lines secure chat to on-call provider FELIPA Tucker NP awaiting response

## 2025-02-09 NOTE — PLAN OF CARE
Problem: Adult Inpatient Plan of Care  Goal: Plan of Care Review  Outcome: Progressing  Goal: Patient-Specific Goal (Individualized)  Outcome: Progressing  Goal: Absence of Hospital-Acquired Illness or Injury  Outcome: Progressing  Goal: Optimal Comfort and Wellbeing  Outcome: Progressing  Goal: Readiness for Transition of Care  Outcome: Progressing     Problem: Diabetes Comorbidity  Goal: Blood Glucose Level Within Targeted Range  Outcome: Progressing     Problem: Wound  Goal: Optimal Coping  Outcome: Progressing  Goal: Optimal Functional Ability  Outcome: Progressing  Goal: Absence of Infection Signs and Symptoms  Outcome: Progressing  Goal: Improved Oral Intake  Outcome: Progressing  Goal: Optimal Pain Control and Function  Outcome: Progressing  Goal: Skin Health and Integrity  Outcome: Progressing  Goal: Optimal Wound Healing  Outcome: Progressing     Problem: Fall Injury Risk  Goal: Absence of Fall and Fall-Related Injury  Outcome: Progressing     Problem: Skin Injury Risk Increased  Goal: Skin Health and Integrity  Outcome: Progressing     Problem: Infection  Goal: Absence of Infection Signs and Symptoms  Outcome: Progressing     Problem: Stroke, Ischemic (Includes Transient Ischemic Attack)  Goal: Optimal Coping  Outcome: Progressing  Goal: Effective Bowel Elimination  Outcome: Progressing  Goal: Optimal Cerebral Tissue Perfusion  Outcome: Progressing  Goal: Optimal Cognitive Function  Outcome: Progressing  Goal: Improved Communication Skills  Outcome: Progressing  Goal: Optimal Functional Ability  Outcome: Progressing  Goal: Optimal Nutrition Intake  Outcome: Progressing  Goal: Effective Oxygenation and Ventilation  Outcome: Progressing  Goal: Improved Sensorimotor Function  Outcome: Progressing  Goal: Safe and Effective Swallow  Outcome: Progressing  Goal: Effective Urinary Elimination  Outcome: Progressing     Problem: Dysrhythmia  Goal: Normalized Cardiac Rhythm  Outcome: Progressing     Problem:  Comorbidity Management  Goal: Maintenance of Behavioral Health Symptom Control  Outcome: Progressing  Goal: Blood Pressure in Desired Range  Outcome: Progressing  Goal: Maintenance of Heart Failure Symptom Control  Outcome: Progressing     Problem: UTI (Urinary Tract Infection)  Goal: Improved Infection Symptoms  Outcome: Progressing     Problem: Enteral Nutrition  Goal: Absence of Aspiration Signs and Symptoms  Outcome: Progressing  Goal: Safe, Effective Therapy Delivery  Outcome: Progressing  Goal: Feeding Tolerance  Outcome: Progressing

## 2025-02-09 NOTE — SUBJECTIVE & OBJECTIVE
"Interval History: lying in bed, still unresponsive, family at the bedside reported patient was speaking unintelligible words..   S/p PEG tube placement on 2/7- resume tube feed  BP still uncontrolled - uptitrate meds    Urine culture with multiple organism non predominance-deescalate soon      Review of Systems   Unable to perform ROS: Patient unresponsive     Objective:     Vital Signs (Most Recent):  Temp: 96.7 °F (35.9 °C) (02/09/25 0829)  Pulse: (!) 18 (02/09/25 0927)  Resp: 18 (02/09/25 0927)  BP: 134/88 (02/09/25 0927)  SpO2: 96 % (02/09/25 0828) Vital Signs (24h Range):  Temp:  [96.7 °F (35.9 °C)-99 °F (37.2 °C)] 96.7 °F (35.9 °C)  Pulse:  [] 18  Resp:  [17-20] 18  SpO2:  [95 %-98 %] 96 %  BP: (120-204)/(60-93) 134/88     Weight: 52.4 kg (115 lb 8.3 oz)  Body mass index is 23.33 kg/m².    Intake/Output Summary (Last 24 hours) at 2/9/2025 1108  Last data filed at 2/9/2025 0517  Gross per 24 hour   Intake 864 ml   Output --   Net 864 ml         Physical Exam  Constitutional:       General: She is not in acute distress.     Appearance: She is ill-appearing. She is not toxic-appearing.      Comments: Very cachectic   HENT:      Head: Normocephalic and atraumatic.   Cardiovascular:      Rate and Rhythm: Normal rate. Rhythm irregular.   Pulmonary:      Effort: Pulmonary effort is normal.      Breath sounds: Normal breath sounds.   Abdominal:      General: Abdomen is flat.      Palpations: Abdomen is soft.   Musculoskeletal:         General: No swelling or tenderness.      Cervical back: Normal range of motion.      Right lower leg: No edema.      Left lower leg: No edema.   Skin:     General: Skin is dry.      Capillary Refill: Capillary refill takes less than 2 seconds.      Coloration: Skin is pale.   Neurological:      Mental Status: She is disoriented.             Significant Labs: A1C:   Recent Labs   Lab 10/01/24  0816 01/29/25  2300   HGBA1C 5.8* 5.7*     ABGs:   No results for input(s): "PH", "PCO2", " ""HCO3", "POCSATURATED", "BE", "TOTALHB", "COHB", "METHB", "O2HB", "POCFIO2", "PO2" in the last 48 hours.    Blood Culture:   No results for input(s): "LABBLOO" in the last 48 hours.    CBC:   Recent Labs   Lab 02/08/25  0420   WBC 6.62   HGB 11.9*   HCT 38.3        CMP:   Recent Labs   Lab 02/08/25  0420 02/08/25  1345 02/09/25  0804     135* 136 138   K 4.2  4.1 4.1 3.8     103 104 105   CO2 23  25 23 28   *  262* 322* 142*   BUN 15  14 13 12   CREATININE 0.9  0.9 0.8 0.7   CALCIUM 8.5*  8.5* 8.5* 8.7   ALBUMIN 2.3*  --   --    ANIONGAP 11  7* 9 5*     Cardiac Markers:   No results for input(s): "CKMB", "MYOGLOBIN", "BNP", "TROPISTAT" in the last 48 hours.    Lactic Acid:   No results for input(s): "LACTATE" in the last 48 hours.    Lipase:   No results for input(s): "LIPASE" in the last 48 hours.    Lipid Panel: No results for input(s): "CHOL", "HDL", "LDLCALC", "TRIG", "CHOLHDL" in the last 48 hours.  Magnesium:   Recent Labs   Lab 02/08/25  0420   MG 1.8     Troponin:   No results for input(s): "TROPONINI", "TROPONINIHS" in the last 48 hours.    TSH:   Recent Labs   Lab 02/03/25  1353   TSH 3.156     Urine Culture:   No results for input(s): "LABURIN" in the last 48 hours.    Urine Studies:   No results for input(s): "COLORU", "APPEARANCEUA", "PHUR", "SPECGRAV", "PROTEINUA", "GLUCUA", "KETONESU", "BILIRUBINUA", "OCCULTUA", "NITRITE", "UROBILINOGEN", "LEUKOCYTESUR", "RBCUA", "WBCUA", "BACTERIA", "SQUAMEPITHEL", "HYALINECASTS" in the last 48 hours.    Invalid input(s): "WRIGHTSUR"      Significant Imaging: I have reviewed all pertinent imaging results/findings within the past 24 hours.  "

## 2025-02-09 NOTE — ASSESSMENT & PLAN NOTE
UA positive  Urine cx-   Urine culture with multiple organism non predominance-deescalate soon    Ceftriaxone deescalate soon

## 2025-02-09 NOTE — NURSING
Vistaril not effective pt given Haldol 1 mg IVP per order as she remains restless/agitated she is unable to be redirected family remains at BS

## 2025-02-10 LAB
ALBUMIN SERPL BCP-MCNC: 2.6 G/DL (ref 3.5–5.2)
ANION GAP SERPL CALC-SCNC: 10 MMOL/L (ref 8–16)
ANION GAP SERPL CALC-SCNC: 10 MMOL/L (ref 8–16)
BASOPHILS # BLD AUTO: 0.01 K/UL (ref 0–0.2)
BASOPHILS NFR BLD: 0.1 % (ref 0–1.9)
BUN SERPL-MCNC: 12 MG/DL (ref 8–23)
BUN SERPL-MCNC: 12 MG/DL (ref 8–23)
CALCIUM SERPL-MCNC: 9.1 MG/DL (ref 8.7–10.5)
CALCIUM SERPL-MCNC: 9.1 MG/DL (ref 8.7–10.5)
CHLORIDE SERPL-SCNC: 100 MMOL/L (ref 95–110)
CHLORIDE SERPL-SCNC: 101 MMOL/L (ref 95–110)
CO2 SERPL-SCNC: 28 MMOL/L (ref 23–29)
CO2 SERPL-SCNC: 28 MMOL/L (ref 23–29)
CREAT SERPL-MCNC: 0.7 MG/DL (ref 0.5–1.4)
CREAT SERPL-MCNC: 0.7 MG/DL (ref 0.5–1.4)
DIFFERENTIAL METHOD BLD: ABNORMAL
EOSINOPHIL # BLD AUTO: 0.1 K/UL (ref 0–0.5)
EOSINOPHIL NFR BLD: 1.4 % (ref 0–8)
ERYTHROCYTE [DISTWIDTH] IN BLOOD BY AUTOMATED COUNT: 15.1 % (ref 11.5–14.5)
EST. GFR  (NO RACE VARIABLE): >60 ML/MIN/1.73 M^2
EST. GFR  (NO RACE VARIABLE): >60 ML/MIN/1.73 M^2
GLUCOSE SERPL-MCNC: 122 MG/DL (ref 70–110)
GLUCOSE SERPL-MCNC: 123 MG/DL (ref 70–110)
HCT VFR BLD AUTO: 36.2 % (ref 37–48.5)
HGB BLD-MCNC: 11.2 G/DL (ref 12–16)
IMM GRANULOCYTES # BLD AUTO: 0.04 K/UL (ref 0–0.04)
IMM GRANULOCYTES NFR BLD AUTO: 0.5 % (ref 0–0.5)
LYMPHOCYTES # BLD AUTO: 0.7 K/UL (ref 1–4.8)
LYMPHOCYTES NFR BLD: 8.3 % (ref 18–48)
MAGNESIUM SERPL-MCNC: 1.9 MG/DL (ref 1.6–2.6)
MCH RBC QN AUTO: 28.1 PG (ref 27–31)
MCHC RBC AUTO-ENTMCNC: 30.9 G/DL (ref 32–36)
MCV RBC AUTO: 91 FL (ref 82–98)
MONOCYTES # BLD AUTO: 0.9 K/UL (ref 0.3–1)
MONOCYTES NFR BLD: 10.9 % (ref 4–15)
NEUTROPHILS # BLD AUTO: 6.6 K/UL (ref 1.8–7.7)
NEUTROPHILS NFR BLD: 78.8 % (ref 38–73)
NRBC BLD-RTO: 0 /100 WBC
PHOSPHATE SERPL-MCNC: 1.9 MG/DL (ref 2.7–4.5)
PHOSPHATE SERPL-MCNC: 2 MG/DL (ref 2.7–4.5)
PLATELET # BLD AUTO: 308 K/UL (ref 150–450)
PMV BLD AUTO: 9.5 FL (ref 9.2–12.9)
POCT GLUCOSE: 113 MG/DL (ref 70–110)
POCT GLUCOSE: 115 MG/DL (ref 70–110)
POCT GLUCOSE: 125 MG/DL (ref 70–110)
POCT GLUCOSE: 169 MG/DL (ref 70–110)
POTASSIUM SERPL-SCNC: 4.4 MMOL/L (ref 3.5–5.1)
POTASSIUM SERPL-SCNC: 4.5 MMOL/L (ref 3.5–5.1)
RBC # BLD AUTO: 3.99 M/UL (ref 4–5.4)
SODIUM SERPL-SCNC: 138 MMOL/L (ref 136–145)
SODIUM SERPL-SCNC: 139 MMOL/L (ref 136–145)
WBC # BLD AUTO: 8.35 K/UL (ref 3.9–12.7)

## 2025-02-10 PROCEDURE — 63600175 PHARM REV CODE 636 W HCPCS: Performed by: INTERNAL MEDICINE

## 2025-02-10 PROCEDURE — 25000003 PHARM REV CODE 250: Performed by: NURSE PRACTITIONER

## 2025-02-10 PROCEDURE — 25000003 PHARM REV CODE 250: Performed by: FAMILY MEDICINE

## 2025-02-10 PROCEDURE — 25000003 PHARM REV CODE 250

## 2025-02-10 PROCEDURE — 85025 COMPLETE CBC W/AUTO DIFF WBC: CPT | Performed by: INTERNAL MEDICINE

## 2025-02-10 PROCEDURE — 80069 RENAL FUNCTION PANEL: CPT | Performed by: INTERNAL MEDICINE

## 2025-02-10 PROCEDURE — 83735 ASSAY OF MAGNESIUM: CPT | Performed by: INTERNAL MEDICINE

## 2025-02-10 PROCEDURE — 11000001 HC ACUTE MED/SURG PRIVATE ROOM

## 2025-02-10 PROCEDURE — 80048 BASIC METABOLIC PNL TOTAL CA: CPT

## 2025-02-10 PROCEDURE — 36415 COLL VENOUS BLD VENIPUNCTURE: CPT

## 2025-02-10 PROCEDURE — 99900035 HC TECH TIME PER 15 MIN (STAT)

## 2025-02-10 PROCEDURE — 25000003 PHARM REV CODE 250: Performed by: STUDENT IN AN ORGANIZED HEALTH CARE EDUCATION/TRAINING PROGRAM

## 2025-02-10 PROCEDURE — 84100 ASSAY OF PHOSPHORUS: CPT

## 2025-02-10 PROCEDURE — 27000221 HC OXYGEN, UP TO 24 HOURS

## 2025-02-10 PROCEDURE — 94761 N-INVAS EAR/PLS OXIMETRY MLT: CPT

## 2025-02-10 PROCEDURE — 25000003 PHARM REV CODE 250: Performed by: INTERNAL MEDICINE

## 2025-02-10 RX ORDER — AMOXICILLIN 250 MG
1 CAPSULE ORAL 2 TIMES DAILY
Status: DISCONTINUED | OUTPATIENT
Start: 2025-02-10 | End: 2025-02-12 | Stop reason: HOSPADM

## 2025-02-10 RX ORDER — DILTIAZEM HYDROCHLORIDE 30 MG/1
30 TABLET, FILM COATED ORAL EVERY 6 HOURS
Status: DISCONTINUED | OUTPATIENT
Start: 2025-02-10 | End: 2025-02-11

## 2025-02-10 RX ORDER — ATORVASTATIN CALCIUM 40 MG/1
80 TABLET, FILM COATED ORAL DAILY
Status: DISCONTINUED | OUTPATIENT
Start: 2025-02-11 | End: 2025-02-12 | Stop reason: HOSPADM

## 2025-02-10 RX ORDER — LISINOPRIL 20 MG/1
20 TABLET ORAL DAILY
Status: DISCONTINUED | OUTPATIENT
Start: 2025-02-11 | End: 2025-02-11

## 2025-02-10 RX ORDER — DIGOXIN 125 MCG
0.12 TABLET ORAL DAILY
Status: DISCONTINUED | OUTPATIENT
Start: 2025-02-11 | End: 2025-02-12 | Stop reason: HOSPADM

## 2025-02-10 RX ORDER — HYDROXYCHLOROQUINE SULFATE 200 MG/1
200 TABLET, FILM COATED ORAL 2 TIMES DAILY
Status: DISCONTINUED | OUTPATIENT
Start: 2025-02-10 | End: 2025-02-12 | Stop reason: HOSPADM

## 2025-02-10 RX ORDER — HYDROXYZINE PAMOATE 25 MG/1
25 CAPSULE ORAL EVERY 6 HOURS PRN
Status: DISCONTINUED | OUTPATIENT
Start: 2025-02-10 | End: 2025-02-12 | Stop reason: HOSPADM

## 2025-02-10 RX ORDER — FUROSEMIDE 20 MG/1
20 TABLET ORAL DAILY
Status: DISCONTINUED | OUTPATIENT
Start: 2025-02-11 | End: 2025-02-12 | Stop reason: HOSPADM

## 2025-02-10 RX ORDER — NAPROXEN SODIUM 220 MG/1
81 TABLET, FILM COATED ORAL DAILY
Status: DISCONTINUED | OUTPATIENT
Start: 2025-02-11 | End: 2025-02-12 | Stop reason: HOSPADM

## 2025-02-10 RX ADMIN — ASPIRIN 81 MG CHEWABLE TABLET 81 MG: 81 TABLET CHEWABLE at 09:02

## 2025-02-10 RX ADMIN — DIGOXIN 0.12 MG: 125 TABLET ORAL at 09:02

## 2025-02-10 RX ADMIN — SENNOSIDES AND DOCUSATE SODIUM 1 TABLET: 8.6; 5 TABLET ORAL at 09:02

## 2025-02-10 RX ADMIN — METOPROLOL TARTRATE 75 MG: 50 TABLET, FILM COATED ORAL at 08:02

## 2025-02-10 RX ADMIN — METOPROLOL TARTRATE 75 MG: 50 TABLET, FILM COATED ORAL at 12:02

## 2025-02-10 RX ADMIN — METOPROLOL TARTRATE 75 MG: 50 TABLET, FILM COATED ORAL at 09:02

## 2025-02-10 RX ADMIN — APIXABAN 2.5 MG: 2.5 TABLET, FILM COATED ORAL at 09:02

## 2025-02-10 RX ADMIN — Medication 200 ML: at 12:02

## 2025-02-10 RX ADMIN — DILTIAZEM HYDROCHLORIDE 30 MG: 30 TABLET, FILM COATED ORAL at 05:02

## 2025-02-10 RX ADMIN — Medication 200 ML: at 09:02

## 2025-02-10 RX ADMIN — Medication 200 ML: at 10:02

## 2025-02-10 RX ADMIN — SENNOSIDES AND DOCUSATE SODIUM 1 TABLET: 50; 8.6 TABLET ORAL at 08:02

## 2025-02-10 RX ADMIN — HALOPERIDOL LACTATE 1 MG: 5 INJECTION, SOLUTION INTRAMUSCULAR at 03:02

## 2025-02-10 RX ADMIN — Medication 200 ML: at 04:02

## 2025-02-10 RX ADMIN — ATORVASTATIN CALCIUM 80 MG: 40 TABLET, FILM COATED ORAL at 09:02

## 2025-02-10 RX ADMIN — HYDROXYCHLOROQUINE SULFATE 200 MG: 200 TABLET, FILM COATED ORAL at 09:02

## 2025-02-10 RX ADMIN — METOPROLOL TARTRATE 75 MG: 50 TABLET, FILM COATED ORAL at 04:02

## 2025-02-10 RX ADMIN — FAMOTIDINE 20 MG: 10 INJECTION, SOLUTION INTRAVENOUS at 09:02

## 2025-02-10 RX ADMIN — LISINOPRIL 20 MG: 20 TABLET ORAL at 09:02

## 2025-02-10 RX ADMIN — DILTIAZEM HYDROCHLORIDE 30 MG: 30 TABLET, FILM COATED ORAL at 06:02

## 2025-02-10 RX ADMIN — DILTIAZEM HYDROCHLORIDE 30 MG: 30 TABLET, FILM COATED ORAL at 12:02

## 2025-02-10 RX ADMIN — APIXABAN 2.5 MG: 2.5 TABLET, FILM COATED ORAL at 08:02

## 2025-02-10 RX ADMIN — Medication 200 ML: at 06:02

## 2025-02-10 RX ADMIN — FUROSEMIDE 20 MG: 20 TABLET ORAL at 09:02

## 2025-02-10 RX ADMIN — HYDROXYCHLOROQUINE SULFATE 200 MG: 200 TABLET ORAL at 08:02

## 2025-02-10 NOTE — PROGRESS NOTES
[facility]  Progress Note    Patient Name: Eneida Majano  MRN: [id number]  Patient Class: IP- Inpatient   Admission Date: 2/3/2025  Length of Stay: 7 days  Attending Physician: Chhaya Vilchis*  Primary Care Provider: Paula Cooper NP        Subjective     Principal Problem:Cerebrovascular accident (CVA) due to embolism of right middle cerebral artery        HPI:  84-year-old female with a history of anemia, atrial fibrillation/flutter (on Eliquis), rheumatoid arthritis, diabetes, gastroesophageal reflux disease, hypertension, hyperlipidemia, and insomnia , pt was transferred to [facility] on January 28 with aphasia/dysarthria, right lateral gaze, and left-sided weakness. CT head showed evidence of a right MCA distribution stroke along with other areas of infarct. CTA of the head and neck showed occlusion of the right M2 branch. She had Vascular Neurology tele-consultation and was felt not to be a thrombolytic candidate. She was also not a thrombectomy candidate with a large core infarct on imaging. MRI brain on 1/29 also showed the areas of infarct. She was admitted with right MCA stroke, atrial flutter/fibrillation with rapid ventricular response, and encephalopathy. Anticoagulation was held due to concern for potential hemorrhagic conversion. Also troponin was elevated and she had echocardiogram that showed EF 28% with decreased right ventricular systolic function and moderate pulmonary hypertension. Mentation improved somewhat by January 31. She was placed on Plavix with plans to resume anticoagulation after 7 days. By February 1, she had increased confusion. She was more tachycardic. On the morning of February 3, tachycardia persisted and she was upgraded in status and started on esmolol infusion. She was seen by Cardiology. On the morning of February 3 she was awake but confused and not following commands. She was unable to swallow oral medicines. Blood  pressure remained stable. Pt was transfer to Hospital Medicine at [facility] in ICU status for continued rate control with esmolol.       On day of transfer to [facility] :  February 3: Sodium 138, potassium 4.1, chloride 108, CO2 19, BUN 13, creatinine 0.8, glucose 125, magnesium 1.9, white blood cells 7.04, hemoglobin 12.8, hematocrit 39.8, platelets 256  -EKG showed atrial fibrillation with ventricular rate 123. T-wave abnormality.    January 31: AST 23, ALT 20  -abdominal ultrasound had no acute findings    January 30: CT head showed redemonstration of acute right MCA infarct. No acute intracranial hemorrhage.  -INR 1.1  -echocardiogram had EF 28%. Unable to assess diastolic function due to atrial fibrillation. Decreased right ventricular systolic function. Right atrium is dilated. Moderate mitral regurgitation. Moderate tricuspid regurgitation. Moderate pulmonary hypertension with estimated PA systolic pressure 60 mmHg. Intermediate venous pressure at mmHg.    January 29: MRI brain had areas of acute infarction in the right MCA distribution largest involving the right perisylvian and lateral right frontal regions and smaller lateral right parieto-occipital cortical infarct with associated localized mass effect including effacement of adjacent portions of the right sylvian fissure and overlying cortical sulci. Non acute posterior left frontal subcortical white matter infarct. Moderate presumed microvascular ischemic change     Overview/Hospital Course:  No notes on file    Interval History: lying in bed, minimally responsive-open eyes to tactile stimuli otherwise unchanged.  No family at the bedside.,  Tolerating tube feeding     S/p PEG tube placement on 2/7- resume tube feed  BP still uncontrolled - uptitrate meds    Urine culture with multiple organism non predominance-discontinue antibiotics    Awaiting placement, if denied by LTAC please consider palliative care consult      Review of  "Systems   Unable to perform ROS: Patient unresponsive     Objective:     Vital Signs (Most Recent):  Temp: 98.6 °F (37 °C) (02/10/25 0759)  Pulse: 80 (02/10/25 0759)  Resp: 18 (02/10/25 0759)  BP: (!) 154/72 (02/10/25 0759)  SpO2: 97 % (02/10/25 0759) Vital Signs (24h Range):  Temp:  [97 °F (36.1 °C)-98.6 °F (37 °C)] 98.6 °F (37 °C)  Pulse:  [61-88] 80  Resp:  [17-18] 18  SpO2:  [94 %-100 %] 97 %  BP: (128-168)/() 154/72     Weight: 52.4 kg (115 lb 8.3 oz)  Body mass index is 23.33 kg/m².    Intake/Output Summary (Last 24 hours) at 2/10/2025 0932  Last data filed at 2/10/2025 0615  Gross per 24 hour   Intake 1050 ml   Output 2100 ml   Net -1050 ml         Physical Exam  Constitutional:       General: She is not in acute distress.     Appearance: She is ill-appearing. She is not toxic-appearing.      Comments: Very cachectic   HENT:      Head: Normocephalic and atraumatic.   Cardiovascular:      Rate and Rhythm: Normal rate. Rhythm irregular.   Pulmonary:      Effort: Pulmonary effort is normal.      Breath sounds: Normal breath sounds.   Abdominal:      General: Abdomen is flat.      Palpations: Abdomen is soft.   Musculoskeletal:         General: No swelling or tenderness.      Cervical back: Normal range of motion.      Right lower leg: No edema.      Left lower leg: No edema.   Skin:     General: Skin is dry.      Capillary Refill: Capillary refill takes less than 2 seconds.      Coloration: Skin is pale.   Neurological:      Mental Status: She is disoriented.             Significant Labs: A1C:   Recent Labs   Lab 10/01/24  0816 01/29/25  2300   HGBA1C 5.8* 5.7*     ABGs:   No results for input(s): "PH", "PCO2", "HCO3", "POCSATURATED", "BE", "TOTALHB", "COHB", "METHB", "O2HB", "POCFIO2", "PO2" in the last 48 hours.    Blood Culture:   No results for input(s): "LABBLOO" in the last 48 hours.    CBC:   Recent Labs   Lab 02/10/25  0421   WBC 8.35   HGB 11.2*   HCT 36.2*        CMP:   Recent Labs   Lab " "02/08/25  1345 02/09/25  0804 02/10/25  0421    138 139  138   K 4.1 3.8 4.5  4.4    105 101  100   CO2 23 28 28  28   * 142* 123*  122*   BUN 13 12 12  12   CREATININE 0.8 0.7 0.7  0.7   CALCIUM 8.5* 8.7 9.1  9.1   ALBUMIN  --   --  2.6*   ANIONGAP 9 5* 10  10     Cardiac Markers:   No results for input(s): "CKMB", "MYOGLOBIN", "BNP", "TROPISTAT" in the last 48 hours.    Lactic Acid:   No results for input(s): "LACTATE" in the last 48 hours.    Lipase:   No results for input(s): "LIPASE" in the last 48 hours.    Lipid Panel: No results for input(s): "CHOL", "HDL", "LDLCALC", "TRIG", "CHOLHDL" in the last 48 hours.  Magnesium:   Recent Labs   Lab 02/10/25  0421   MG 1.9     Troponin:   No results for input(s): "TROPONINI", "TROPONINIHS" in the last 48 hours.    TSH:   Recent Labs   Lab 02/03/25  1353   TSH 3.156     Urine Culture:   No results for input(s): "LABURIN" in the last 48 hours.    Urine Studies:   No results for input(s): "COLORU", "APPEARANCEUA", "PHUR", "SPECGRAV", "PROTEINUA", "GLUCUA", "KETONESU", "BILIRUBINUA", "OCCULTUA", "NITRITE", "UROBILINOGEN", "LEUKOCYTESUR", "RBCUA", "WBCUA", "BACTERIA", "SQUAMEPITHEL", "HYALINECASTS" in the last 48 hours.    Invalid input(s): "WRIGHTSUR"      Significant Imaging: I have reviewed all pertinent imaging results/findings within the past 24 hours.    Assessment and Plan     * Cerebrovascular accident (CVA) due to embolism of right middle cerebral artery  Presented on January 28, 2025 with large stroke, patient was not a candidate for tPA versus thrombectomy  Tele stroke was consulted.  The all anticoagulants to be started after in 7 days on February 4th  Neurology consulted at [facility] after arrival on 02/03       Antithrombotics for secondary stroke prevention: Antiplatelets: None:  Large size stroke on 01/28 patient to wait total of 7 days to restart anticoagulants on 2/for    Statins for secondary stroke prevention and " hyperlipidemia, if present:   Statins: Atorvastatin- 40 mg daily    Aggressive risk factor modification: HTN, DM, A-Fib, CAD     Rehab efforts: The patient has been evaluated by a stroke team provider and the therapy needs have been fully considered based off the presenting complaints and exam findings. The following therapy evaluations are needed: PT evaluate and treat, OT evaluate and treat, SLP evaluate and treat    Diagnostics ordered/pending: Carotid ultrasound to assess vasculature, CT scan of head without contrast to asses brain parenchyma, CTA Head to assess vasculature , HgbA1C to assess blood glucose levels, Lipid Profile to assess cholesterol levels, MRA head to assess vasculature, MRA neck/arch to assess vasculature, MRI head without contrast to assess brain parenchyma, TTE to assess cardiac function/status , TSH to assess thyroid function    MRA brain on 01/29  Areas of acute infarction right MCA distribution largest involving the right perisylvian and lateral right frontal regions measuring 4.9 x 2.9 cm and smaller lateral right parietooccipital cortical infarct measuring 2.6 x 2.1 cm with associated localized mass effect including effacement of adjacent portions right sylvian fissure and overlying cortical sulci.     Nonacute posterior left frontal subcortical white matter infarct with small focus overlying cortical involvement.  Moderate presumed microvascular ischemic change white matter.    CT head on 02/03  Evolving right MCA vascular territory infarcts    MRI brain on 2/4  Stable bilateral acute/subacute supratentorial infarcts without significant extension or hemorrhagic conversion.     Background of prominent periventricular white matter changes of chronic microvascular ischemia.    EEG    This is an abnormal EEG during stupor state.  The overall degree of disorganization and slowing for given age is suggestive of a moderate to severe encephalopathy, likely a toxic metabolic encephalopathy.   There is no evidence of an epileptic process on this recording.  No seizures were recorded during this study.   VTE prophylaxis: None: Reason for No Pharmacological VTE Prophylaxis:  Due to large stroke per Neurology recommendation had [facility] patient to be started after 7 days (2/4    BP parameters: Infarct:  Out of the window at this moment to maintain blood pressure within normal values      Awaiting placement, if denied by LTAC please consider palliative care consult    UTI (urinary tract infection)  UA positive  Urine cx-   Urine culture with multiple organism non predominance-deescalate soon    Ceftriaxone deescalate soon      ACP (advance care planning)  Advance Care Planning    Date: 02/04/2025    Code Status  Code status unknown. we agreed to leave patient as full code until code status is confirmed  by family./     A total of 20 min was spent on advance care planning, goals of care discussion, emotional support, formulating and communicating prognosis and exploring burden/benefit of various approaches of treatment. This discussion occurred on a fully voluntary basis with the verbal consent of the patient and/or family.      Advance Care Planning    Date: 02/05/2025  Primary team and pulmonary crit met with patient family at the bedside, updated on clinical status and imaging reports. Also discussed possible disease trajectory, family open to supportive treatment for now to include peg tube placement and possible LTAC placement. Code status also discussed and family wants to continue present treament regimen but make patient DNR in the event she decline clinically.  Questions and concerns were addressed          Code Status  In light of the patients advanced and life limiting illness,I engaged the the family in a voluntary conversation about the patient's preferences for care  at the very end of life. The patient wishes to have a natural, peaceful death.  Along those lines, the patient does  not wish to have CPR or other invasive treatments performed when her heart and/or breathing stops. I communicated to the family that a DNR order would be placed in her medical record to reflect this preference.    A total of 15 min was spent on advance care planning, goals of care discussion, emotional support, formulating and communicating prognosis and exploring burden/benefit of various approaches of treatment. This discussion occurred on a fully voluntary basis with the verbal consent of the patient and/or family.            Acute on chronic heart failure  Cardiology was consulted currently patient is on esmolol drip.  Beta blockers scheduled  Patient is a euvolemic  Patient received loading dose of digoxin for AFib with RVR uncontrolled  Echo Saline Bubble? Yes; Ultrasound enhancing contrast? Yes    Result Date: 1/30/2025    Left Ventricle: The left ventricle is normal in size. Mildly increased   wall thickness. Global hypokinesis present. There is severely reduced   systolic function. Quantitated ejection fraction is 28%. Unable to assess   diastolic function due to atrial fibrillation.    Right Ventricle: Right ventricular enlargement. Systolic function is   reduced.    Left Atrium: Left atrium is severely dilated. Agitated saline study of   the atrial septum is negative after vasalva maneuver, suggesting absence   of intracardiac shunt at the atrial level.    Right Atrium: Right atrium is dilated.    Mitral Valve: There is moderate regurgitation.  EROA 0.31 cm2    Tricuspid Valve: There is moderate regurgitation.    Pulmonary Artery: There is moderate pulmonary hypertension. The   estimated pulmonary artery systolic pressure is 60 mmHg.    IVC/SVC: Intermediate venous pressure at 8 mmHg.         Acute encephalopathy  2/3 at [facility] sent for stat CT head  -TSH T4 ammonia levels  -a neurology consulted-appreciate recs    Repeat MRI brain  unchanged   EEG   This is an abnormal EEG during stupor state.   The overall degree of disorganization and slowing for given age is suggestive of a moderate to severe encephalopathy, likely a toxic metabolic encephalopathy.  There is no evidence of an epileptic process on this recording.  No seizures were recorded during this study.   Consult GI for PEG tube placement- planned for 2/7    Stroke due to occlusion of right middle cerebral artery  Presented on January 28, 2025 with large stroke, patient was not a candidate for tPA versus thrombectomy  Tele stroke was consulted.  The all anticoagulants to be started after in 7 days on February 4th  Neurology consulted at [facility] after arrival on 02/03       Antithrombotics for secondary stroke prevention: Antiplatelets: None:  Large size stroke on 01/28 patient to wait total of 7 days to restart anticoagulants on 2/for    Statins for secondary stroke prevention and hyperlipidemia, if present:   Statins: Atorvastatin- 40 mg daily    Aggressive risk factor modification: HTN, DM, A-Fib, CAD     Rehab efforts: The patient has been evaluated by a stroke team provider and the therapy needs have been fully considered based off the presenting complaints and exam findings. The following therapy evaluations are needed: PT evaluate and treat, OT evaluate and treat, SLP evaluate and treat    Diagnostics ordered/pending: Carotid ultrasound to assess vasculature, CT scan of head without contrast to asses brain parenchyma, CTA Head to assess vasculature , HgbA1C to assess blood glucose levels, Lipid Profile to assess cholesterol levels, MRA head to assess vasculature, MRA neck/arch to assess vasculature, MRI head without contrast to assess brain parenchyma, TTE to assess cardiac function/status , TSH to assess thyroid function    VTE prophylaxis: None: Reason for No Pharmacological VTE Prophylaxis:  Due to large stroke per Neurology recommendation had [facility] patient to be started after 7 days (2/4    BP parameters: Infarct:   Out of the window at this moment to maintain blood pressure within normal values        Dysphagia  S/p stroke   Consult SLP-appreciate recs   Consult GI for PEG tube placement   S/p peg tube placed on 2/7  Resume tube feed      Atrial fibrillation with RVR  Patient has paroxysmal (<7 days) atrial fibrillation. Patient is currently in atrial fibrillation. [id number] Score: 5. The patients heart rate in the last 24 hours is as follows:  Pulse  Min: 79  Max: 135     Antiarrhythmics  esmolol 2000 mg in sodium chloride 0.9% 100 mL (20 mg/mL), Continuous, Intravenous  metoprolol injection 5 mg, Every 6 hours PRN, Intravenous  metoprolol tartrate (LOPRESSOR) tablet 50 mg, Every 6 hours, Oral    Anticoagulants  apixaban tablet 2.5 mg, 2 times daily, Oral    Plan  - Replete lytes with a goal of K>4, Mg >2  - Patient is not anticoagulated due to recent history of massive stroke, after clearance from Neurology can consider starting patient on anticoagulated on 2/for  - Patient's afib is currently uncontrolled. Will adjust treatment as follows:  Cardiology consulted currently on esmolol drip, digoxin level than load with digoxin    Wean esmolol drip to metoprolol      Essential hypertension  Patient's blood pressure range in the last 24 hours was: BP  Min: 114/94  Max: 189/116.The patient's inpatient anti-hypertensive regimen is listed below:  Current Antihypertensives  esmolol 2000 mg in sodium chloride 0.9% 100 mL (20 mg/mL), Continuous, Intravenous  labetaloL injection 10 mg, Every 6 hours PRN, Intravenous  metoprolol tartrate (LOPRESSOR) tablet 50 mg, Every 6 hours, Oral    Plan  - BP is controlled, no changes needed to their regimen  - controlled current medications are to control AFib RVR    Gastroesophageal reflux disease  On famotidine       T2DM (type 2 diabetes mellitus)  Patient's FSGs are controlled on current medication regimen.  Last A1c reviewed-   Lab Results   Component Value Date    HGBA1C 5.7 (H) 01/29/2025      Most recent fingerstick glucose reviewed-   Recent Labs   Lab 02/03/25  0300 02/03/25  0537 02/03/25  0814 02/03/25  1244   POCTGLUCOSE 137* 121* 126* 102     Current correctional scale  Low  Maintain anti-hyperglycemic dose as follows-   Antihyperglycemics (From admission, onward)      Start     Stop Route Frequency Ordered    02/03/25 1413  insulin aspart U-100 pen 0-5 Units         -- SubQ Every 6 hours PRN 02/03/25 1313          Hold Oral hypoglycemics while patient is in the hospital.      VTE Risk Mitigation (From admission, onward)           Ordered     apixaban tablet 2.5 mg  2 times daily         02/08/25 1254     IP VTE HIGH RISK PATIENT  Once         02/03/25 1250     Place sequential compression device  Until discontinued         02/03/25 1250                    Discharge Planning   DELORES: 2/10/2025     Code Status: DNR   Medical Readiness for Discharge Date:   Discharge Plan A: Long-term acute care facility (LTAC)   Discharge Delays: None known at this time            Please place Justification for DME        Chhaya Vilchis MD  Department of Hospital Medicine   [place] - Telemetry

## 2025-02-10 NOTE — PT/OT/SLP PROGRESS
Occupational Therapy  Discharge Note      Patient Name:  Eneida Majano   MRN:  [id number]    1050 - Patient not seen today secondary to pt unable to participate/follow instructions & poor arousal.  Pt opened eyes upon hearing her name, but immediately closed them without attempts to engage with therapist.  Pt noted with no improvement or active participation in last 3 OT sessions.  Pt will be discharged from OT services at this time as she is unable to actively participate in therapy.  If improvement in status is noted, pt will be re-evaluated by therapy as needed.      Blank Solis, OT  2/10/2025

## 2025-02-10 NOTE — PLAN OF CARE
Problem: Adult Inpatient Plan of Care  Goal: Plan of Care Review  Outcome: Progressing  Goal: Patient-Specific Goal (Individualized)  Outcome: Progressing  Goal: Absence of Hospital-Acquired Illness or Injury  Outcome: Progressing  Goal: Optimal Comfort and Wellbeing  Outcome: Progressing  Goal: Readiness for Transition of Care  Outcome: Progressing     Problem: Diabetes Comorbidity  Goal: Blood Glucose Level Within Targeted Range  Outcome: Progressing     Problem: Wound  Goal: Optimal Coping  Outcome: Progressing  Goal: Optimal Functional Ability  Outcome: Progressing  Goal: Absence of Infection Signs and Symptoms  Outcome: Progressing  Goal: Improved Oral Intake  Outcome: Progressing  Goal: Optimal Pain Control and Function  Outcome: Progressing  Goal: Skin Health and Integrity  Outcome: Progressing  Goal: Optimal Wound Healing  Outcome: Progressing     Problem: Fall Injury Risk  Goal: Absence of Fall and Fall-Related Injury  Outcome: Progressing     Problem: Skin Injury Risk Increased  Goal: Skin Health and Integrity  Outcome: Progressing     Problem: Infection  Goal: Absence of Infection Signs and Symptoms  Outcome: Progressing     Problem: Stroke, Ischemic (Includes Transient Ischemic Attack)  Goal: Optimal Coping  Outcome: Progressing  Goal: Effective Bowel Elimination  Outcome: Progressing  Goal: Optimal Cerebral Tissue Perfusion  Outcome: Progressing  Goal: Optimal Cognitive Function  Outcome: Progressing  Goal: Improved Communication Skills  Outcome: Progressing  Goal: Optimal Functional Ability  Outcome: Progressing  Goal: Optimal Nutrition Intake  Outcome: Progressing  Goal: Effective Oxygenation and Ventilation  Outcome: Progressing  Goal: Improved Sensorimotor Function  Outcome: Progressing  Goal: Safe and Effective Swallow  Outcome: Progressing  Goal: Effective Urinary Elimination  Outcome: Progressing     Problem: Dysrhythmia  Goal: Normalized Cardiac Rhythm  Outcome: Progressing     Problem:  Comorbidity Management  Goal: Maintenance of Behavioral Health Symptom Control  Outcome: Progressing  Goal: Blood Pressure in Desired Range  Outcome: Progressing  Goal: Maintenance of Heart Failure Symptom Control  Outcome: Progressing     Problem: UTI (Urinary Tract Infection)  Goal: Improved Infection Symptoms  Outcome: Progressing     Problem: Enteral Nutrition  Goal: Absence of Aspiration Signs and Symptoms  Outcome: Progressing  Goal: Safe, Effective Therapy Delivery  Outcome: Progressing  Goal: Feeding Tolerance  Outcome: Progressing     Problem: Swallowing Impairment  Goal: Optimal Eating and Swallowing Without Aspiration  Outcome: Progressing

## 2025-02-10 NOTE — PLAN OF CARE
Medicare Message     Important Message from Medicare regarding Discharge Appeal Rights Other (comments)Important Message from Medicare regarding Discharge Appeal Rights. Other (comments). The comment is IMM explained to patient's daughter and daughter signed, dated, and documented time of 0818. Taken on 1/30/25 0818 Other (comments)Important Message from Medicare regarding Discharge Appeal Rights. Other (comments). The comment is Patient unable to sign due to medical condition.. Taken on 2/10/25 1557   Date IMM was signed 1/30/2025 2/10/2025   Time IMM was signed 0818 1100

## 2025-02-10 NOTE — ANESTHESIA POSTPROCEDURE EVALUATION
Anesthesia Post Evaluation    Patient: Eneida Majano    Procedure(s) Performed: Procedure(s) (LRB):  EGD, WITH PEG TUBE INSERTION (N/A)    Final Anesthesia Type: general      Patient location during evaluation: PACU  Patient participation: Yes- Able to Participate  Level of consciousness: awake and alert  Post-procedure vital signs: reviewed and stable  Pain management: adequate  Airway patency: patent    PONV status at discharge: No PONV  Anesthetic complications: no      Cardiovascular status: blood pressure returned to baseline  Respiratory status: unassisted  Hydration status: euvolemic            Vitals Value Taken Time   /85 02/10/25 1207   Temp 37 °C (98.6 °F) 02/10/25 1207   Pulse 64 02/10/25 1207   Resp 18 02/10/25 1207   SpO2 100 % 02/10/25 1207         Event Time   Out of Recovery 02/07/2025 15:17:49         Pain/Roz Score: No data recorded

## 2025-02-10 NOTE — SUBJECTIVE & OBJECTIVE
Interval History: lying in bed, minimally responsive-open eyes to tactile stimuli otherwise unchanged.  No family at the bedside.,  Tolerating tube feeding     S/p PEG tube placement on 2/7- resume tube feed  BP still uncontrolled - uptitrate meds    Urine culture with multiple organism non predominance-discontinue antibiotics    Awaiting placement, if denied by LTAC please consider palliative care consult      Review of Systems   Unable to perform ROS: Patient unresponsive     Objective:     Vital Signs (Most Recent):  Temp: 98.6 °F (37 °C) (02/10/25 0759)  Pulse: 80 (02/10/25 0759)  Resp: 18 (02/10/25 0759)  BP: (!) 154/72 (02/10/25 0759)  SpO2: 97 % (02/10/25 0759) Vital Signs (24h Range):  Temp:  [97 °F (36.1 °C)-98.6 °F (37 °C)] 98.6 °F (37 °C)  Pulse:  [61-88] 80  Resp:  [17-18] 18  SpO2:  [94 %-100 %] 97 %  BP: (128-168)/() 154/72     Weight: 52.4 kg (115 lb 8.3 oz)  Body mass index is 23.33 kg/m².    Intake/Output Summary (Last 24 hours) at 2/10/2025 0932  Last data filed at 2/10/2025 0615  Gross per 24 hour   Intake 1050 ml   Output 2100 ml   Net -1050 ml         Physical Exam  Constitutional:       General: She is not in acute distress.     Appearance: She is ill-appearing. She is not toxic-appearing.      Comments: Very cachectic   HENT:      Head: Normocephalic and atraumatic.   Cardiovascular:      Rate and Rhythm: Normal rate. Rhythm irregular.   Pulmonary:      Effort: Pulmonary effort is normal.      Breath sounds: Normal breath sounds.   Abdominal:      General: Abdomen is flat.      Palpations: Abdomen is soft.   Musculoskeletal:         General: No swelling or tenderness.      Cervical back: Normal range of motion.      Right lower leg: No edema.      Left lower leg: No edema.   Skin:     General: Skin is dry.      Capillary Refill: Capillary refill takes less than 2 seconds.      Coloration: Skin is pale.   Neurological:      Mental Status: She is disoriented.             Significant Labs:  "A1C:   Recent Labs   Lab 10/01/24  0816 01/29/25  2300   HGBA1C 5.8* 5.7*     ABGs:   No results for input(s): "PH", "PCO2", "HCO3", "POCSATURATED", "BE", "TOTALHB", "COHB", "METHB", "O2HB", "POCFIO2", "PO2" in the last 48 hours.    Blood Culture:   No results for input(s): "LABBLOO" in the last 48 hours.    CBC:   Recent Labs   Lab 02/10/25  0421   WBC 8.35   HGB 11.2*   HCT 36.2*        CMP:   Recent Labs   Lab 02/08/25  1345 02/09/25  0804 02/10/25  0421    138 139  138   K 4.1 3.8 4.5  4.4    105 101  100   CO2 23 28 28  28   * 142* 123*  122*   BUN 13 12 12  12   CREATININE 0.8 0.7 0.7  0.7   CALCIUM 8.5* 8.7 9.1  9.1   ALBUMIN  --   --  2.6*   ANIONGAP 9 5* 10  10     Cardiac Markers:   No results for input(s): "CKMB", "MYOGLOBIN", "BNP", "TROPISTAT" in the last 48 hours.    Lactic Acid:   No results for input(s): "LACTATE" in the last 48 hours.    Lipase:   No results for input(s): "LIPASE" in the last 48 hours.    Lipid Panel: No results for input(s): "CHOL", "HDL", "LDLCALC", "TRIG", "CHOLHDL" in the last 48 hours.  Magnesium:   Recent Labs   Lab 02/10/25  0421   MG 1.9     Troponin:   No results for input(s): "TROPONINI", "TROPONINIHS" in the last 48 hours.    TSH:   Recent Labs   Lab 02/03/25  1353   TSH 3.156     Urine Culture:   No results for input(s): "LABURIN" in the last 48 hours.    Urine Studies:   No results for input(s): "COLORU", "APPEARANCEUA", "PHUR", "SPECGRAV", "PROTEINUA", "GLUCUA", "KETONESU", "BILIRUBINUA", "OCCULTUA", "NITRITE", "UROBILINOGEN", "LEUKOCYTESUR", "RBCUA", "WBCUA", "BACTERIA", "SQUAMEPITHEL", "HYALINECASTS" in the last 48 hours.    Invalid input(s): "WRIGHTSUR"      Significant Imaging: I have reviewed all pertinent imaging results/findings within the past 24 hours.  "

## 2025-02-10 NOTE — PROGRESS NOTES
[facility] - Telemetry  Adult Nutrition  Progress Note    SUMMARY       Recommendations    Recommendation:  1. Add 150ml free water flush QID.   2. Continue ciurrent TF as tolerated.   3. Monitor weight/labs.   4. RD to continue to follow to monitor TF tolerance.    Goals:  TF to meet at least 85% EEN/EPN by RD follow up  Nutrition Goal Status: progressing towards goal  Communication of RD Recs: reviewed with RN    Assessment and Plan  Nutrition Problem  Inadequate energy intake    Related to (etiology):   stroke    Signs and Symptoms (as evidenced by):   NPO, dysphagia, AMS    Interventions:  Collaboration with other providers    Nutrition Diagnosis Status:   Improving      Malnutrition Assessment  Thoracic and Lumbar Region: mild depletion   [place] Region (Muscle Loss): moderate depletion  Clavicle Bone Region (Muscle Loss): mild depletion       Reason for Assessment  Reason For Assessment: RD follow-up  Diagnosis: stroke/CVA  General Information Comments: Pt admitted with stroke. Transferred from [facility]. Pt remains NPO. PEG tube placed 2/7. Receiving TF of Isosource 1.5 at 40ml/hr. Alexey 11- R arm blister, R greater trochanter blister. NFPE completed 2/4- mild wasting of clavicles and thoracic region with moderate wasting of temples. No weight loss noted x 6 months.  Nutrition Discharge Planning: d/c needs to be determined    Past Medical History:   Diagnosis Date    Acid reflux     Anemia     Arthritis     Atrial fibrillation     Carpal tunnel syndrome     Cataract     Dry mouth     GERD (gastroesophageal reflux disease)     Hyperlipidemia     Hypertension     Insomnia     Other osteoporosis without current pathological fracture 3/14/2018    PONV (postoperative nausea and vomiting)     short term        Nutrition/Diet History  Food Preferences: unable to assess  Spiritual, Cultural Beliefs, Buddhist Practices, Values that Affect Care: no  Factors Affecting Nutritional Intake: NPO, impaired cognitive  "status/motor control    Anthropometrics  Height: 4' 11" (149.9 cm)  Height (inches): 59 in  Height Method: Estimated  Weight: 52.4 kg (115 lb 8.3 oz)  Weight (lb): 115.52 lb  Weight Method: Bed Scale  Ideal Body Weight (IBW), Female: 95 lb  % Ideal Body Weight, Female (lb): 121.6 %  BMI (Calculated): 23.3  BMI Grade: 18.5-24.9 - normal  Usual Body Weight (UBW), k.5 kg (8/6)  % Usual Body Weight: 108.27  % Weight Change From Usual Weight: 8.04 %     Lab/Procedures/Meds  Pertinent Labs Reviewed: reviewed  Pertinent Labs Comments: [id number], Alb 2.6L  Pertinent Medications Reviewed: reviewed  Pertinent Medications Comments: aspirin, Lasix, lisinopril, senna    Estimated/Assessed Needs  Weight Used For Calorie Calculations: 52.4 kg (115 lb 8.3 oz)  Energy Calorie Requirements (kcal): 1572 (30 kcal/kg)  Energy Need Method: Kcal/kg  Protein Requirements: 52g (1.0g/kg)  Weight Used For Protein Calculations: 52.4 kg (115 lb 8.3 oz)  Estimated Fluid Requirement Method: RDA Method  RDA Method (mL): 1572     Nutrition Prescription Ordered  Current Diet Order: NPO  Current Nutrition Support Formula Ordered: Isosource 1.5  Current Nutrition Support Rate Ordered: 40 (ml)  Current Nutrition Support Frequency Ordered: ml/hr    Evaluation of Received Nutrient/Fluid Intake  Enteral Calories (kcal): 1440  Enteral Protein (gm): 81  Enteral (Free Water) Fluid (mL): 916  Free Water Flush Fluid (mL): 180  % Kcal Needs: 91  % Protein Needs: 155  I/O: 1050/2100  Energy Calories Required: meeting needs  Protein Required: meeting needs  Fluid Required: meeting needs  Comments: LBM 2/7  % Intake of Estimated Energy Needs: 75 - 100 %  % Meal Intake: NPO    Nutrition Risk  Level of Risk/Frequency of Follow-up:  (2xweekly)     Monitor and Evaluation  Food and Nutrient Intake: energy intake  Food and Nutrient Adminstration: diet order, enteral and parenteral nutrition administration  Physical Activity and Function: nutrition-related ADLs and " IADLs  Anthropometric Measurements: weight  Biochemical Data, Medical Tests and Procedures: electrolyte and renal panel  Nutrition-Focused Physical Findings: overall appearance     Nutrition Related Social Determinants of Health: SDOH: Other: PEG tube with TF     Nutrition Follow-Up  RD Follow-up?: Yes

## 2025-02-10 NOTE — ASSESSMENT & PLAN NOTE
Presented on January 28, 2025 with large stroke, patient was not a candidate for tPA versus thrombectomy  Tele stroke was consulted.  The all anticoagulants to be started after in 7 days on February 4th  Neurology consulted at [facility] after arrival on 02/03       Antithrombotics for secondary stroke prevention: Antiplatelets: None:  Large size stroke on 01/28 patient to wait total of 7 days to restart anticoagulants on 2/for    Statins for secondary stroke prevention and hyperlipidemia, if present:   Statins: Atorvastatin- 40 mg daily    Aggressive risk factor modification: HTN, DM, A-Fib, CAD     Rehab efforts: The patient has been evaluated by a stroke team provider and the therapy needs have been fully considered based off the presenting complaints and exam findings. The following therapy evaluations are needed: PT evaluate and treat, OT evaluate and treat, SLP evaluate and treat    Diagnostics ordered/pending: Carotid ultrasound to assess vasculature, CT scan of head without contrast to asses brain parenchyma, CTA Head to assess vasculature , HgbA1C to assess blood glucose levels, Lipid Profile to assess cholesterol levels, MRA head to assess vasculature, MRA neck/arch to assess vasculature, MRI head without contrast to assess brain parenchyma, TTE to assess cardiac function/status , TSH to assess thyroid function    MRA brain on 01/29  Areas of acute infarction right MCA distribution largest involving the right perisylvian and lateral right frontal regions measuring 4.9 x 2.9 cm and smaller lateral right parietooccipital cortical infarct measuring 2.6 x 2.1 cm with associated localized mass effect including effacement of adjacent portions right sylvian fissure and overlying cortical sulci.     Nonacute posterior left frontal subcortical white matter infarct with small focus overlying cortical involvement.  Moderate presumed microvascular ischemic change white matter.    CT head on 02/03  Evolving right  MCA vascular territory infarcts    MRI brain on 2/4  Stable bilateral acute/subacute supratentorial infarcts without significant extension or hemorrhagic conversion.     Background of prominent periventricular white matter changes of chronic microvascular ischemia.    EEG    This is an abnormal EEG during stupor state.  The overall degree of disorganization and slowing for given age is suggestive of a moderate to severe encephalopathy, likely a toxic metabolic encephalopathy.  There is no evidence of an epileptic process on this recording.  No seizures were recorded during this study.   VTE prophylaxis: None: Reason for No Pharmacological VTE Prophylaxis:  Due to large stroke per Neurology recommendation had [facility] patient to be started after 7 days (2/4    BP parameters: Infarct:  Out of the window at this moment to maintain blood pressure within normal values      Awaiting placement, if denied by LTAC please consider palliative care consult

## 2025-02-10 NOTE — PLAN OF CARE
Recommendation:  1. Add 150ml free water flush QID.   2. Continue ciurrent TF as tolerated.   3. Monitor weight/labs.   4. RD to continue to follow to monitor TF tolerance.    Goals:  TF to meet at least 85% EEN/EPN by RD follow up  Nutrition Goal Status: progressing towards goal

## 2025-02-10 NOTE — NURSING
Patient seen for low brody score, currently on tube feedings, patient sleeping with mitts on at present, see at bedside with nurse, presently the patient is on a waffle mattress, bilateral heel intact, moisture barrier and bilateral heel boots applied for pressure injury prevention intervention.

## 2025-02-10 NOTE — PLAN OF CARE
went to meet with patient this morning. No family at bedside. Patient asleep, mittens in place. I spoke with Brinda at [facility]. I updated her (Per MD team) patient is medically ready for LTAC if approved.  to contact [facility] regarding authorization.  spoke with Marcia at [facility]. She was updated patient received PEG tube Friday. Marcia reports she will submit to her doctor for review today for LTAC.  will continue to follow patient through transitions of care and assist with any discharge needs.     -- spoke with Marcia at [facility] [phone]. She reports the LTAC referral will be sent to Medical Review to see if approved. Their doctor was unsure if she met criteria for LTAC. I spoke with Dr. Booker and updated her on this. Awaiting final response.    --Voicemail left for joanna Sauh to discuss discharge plans.     received call back from daughter Adelina. I updated her on above information. I told her LTAC request will be sent for medical review this afternoon. We also discussed plan B if she does not get approved. Daughter unsure what plans would be.. Per PT/OT notes, not following commands/unable to participate in therapy. I updated her if LTAC denied, will place Palliative Consult to discuss goals of care.     Emergency Contacts    Name Relation Home Work Mobile   Adelina Majano Daughter   [phone]      Other Contacts    Name Relation Home Work Mobile   harrison tripp Sister   [phone]   Tania Majano Daughter   [phone]     Future Appointments   Date Time Provider Department Center   2/11/2025 11:30 AM [facility] US1 [facility]   2/18/2025 11:00 AM Paula Cooper NP [facility]   2/20/2025 11:15 AM OPHTHALMOLOGY TESTING, [facility] OPHTHAL [facility]   2/20/2025 12:30 PM JOANNE Kimbrough IV, MD [facility] OPHTHAL [facility]   4/3/2025  7:50 AM [facility] LAB [facility]   4/10/2025  9:00 AM Paula Cooper NP  [facility] MED [facility]   6/16/2025  9:00 AM Pepe Izaguirre MD [facility] DERM [facility]         02/10/25 1059   Discharge Reassessment   Assessment Type Discharge Planning Reassessment   Did the patient's condition or plan change since previous assessment? No   Discharge Plan A Long-term acute care facility (LTAC)   Discharge Plan B   (TBD)   DME Needed Upon Discharge  other (see comments)  (TBD)   Transition of Care Barriers None   Why the patient remains in the hospital Requires continued medical care   Post-Acute Status   Post-Acute Authorization Placement   Post-Acute Placement Status Pending payor review/awaiting authorization (if required)   Discharge Delays None known at this time     Natacha Harrison RN    [phone]

## 2025-02-11 PROBLEM — E83.39 HYPOPHOSPHATEMIA: Status: ACTIVE | Noted: 2025-02-11

## 2025-02-11 LAB
ANION GAP SERPL CALC-SCNC: 9 MMOL/L (ref 8–16)
BUN SERPL-MCNC: 14 MG/DL (ref 8–23)
CALCIUM SERPL-MCNC: 9.4 MG/DL (ref 8.7–10.5)
CHLORIDE SERPL-SCNC: 98 MMOL/L (ref 95–110)
CO2 SERPL-SCNC: 29 MMOL/L (ref 23–29)
CREAT SERPL-MCNC: 0.8 MG/DL (ref 0.5–1.4)
EST. GFR  (NO RACE VARIABLE): >60 ML/MIN/1.73 M^2
GLUCOSE SERPL-MCNC: 131 MG/DL (ref 70–110)
PHOSPHATE SERPL-MCNC: 1.6 MG/DL (ref 2.7–4.5)
POCT GLUCOSE: 122 MG/DL (ref 70–110)
POCT GLUCOSE: 131 MG/DL (ref 70–110)
POCT GLUCOSE: 133 MG/DL (ref 70–110)
POCT GLUCOSE: 136 MG/DL (ref 70–110)
POTASSIUM SERPL-SCNC: 4.6 MMOL/L (ref 3.5–5.1)
SODIUM SERPL-SCNC: 136 MMOL/L (ref 136–145)

## 2025-02-11 PROCEDURE — 94760 N-INVAS EAR/PLS OXIMETRY 1: CPT

## 2025-02-11 PROCEDURE — 36415 COLL VENOUS BLD VENIPUNCTURE: CPT

## 2025-02-11 PROCEDURE — 11000001 HC ACUTE MED/SURG PRIVATE ROOM

## 2025-02-11 PROCEDURE — 84100 ASSAY OF PHOSPHORUS: CPT

## 2025-02-11 PROCEDURE — 25000003 PHARM REV CODE 250: Performed by: INTERNAL MEDICINE

## 2025-02-11 PROCEDURE — 80048 BASIC METABOLIC PNL TOTAL CA: CPT

## 2025-02-11 PROCEDURE — 25000003 PHARM REV CODE 250: Performed by: FAMILY MEDICINE

## 2025-02-11 PROCEDURE — 25000003 PHARM REV CODE 250

## 2025-02-11 RX ORDER — SODIUM,POTASSIUM PHOSPHATES 280-250MG
1 POWDER IN PACKET (EA) ORAL ONCE
Status: COMPLETED | OUTPATIENT
Start: 2025-02-11 | End: 2025-02-11

## 2025-02-11 RX ORDER — LISINOPRIL 20 MG/1
40 TABLET ORAL DAILY
Status: DISCONTINUED | OUTPATIENT
Start: 2025-02-11 | End: 2025-02-12 | Stop reason: HOSPADM

## 2025-02-11 RX ORDER — DILTIAZEM HYDROCHLORIDE 60 MG/1
60 TABLET, FILM COATED ORAL EVERY 6 HOURS
Status: DISCONTINUED | OUTPATIENT
Start: 2025-02-11 | End: 2025-02-12 | Stop reason: HOSPADM

## 2025-02-11 RX ADMIN — Medication 200 ML: at 05:02

## 2025-02-11 RX ADMIN — METOPROLOL TARTRATE 75 MG: 50 TABLET, FILM COATED ORAL at 05:02

## 2025-02-11 RX ADMIN — FAMOTIDINE 20 MG: 10 INJECTION, SOLUTION INTRAVENOUS at 10:02

## 2025-02-11 RX ADMIN — HYDROXYCHLOROQUINE SULFATE 200 MG: 200 TABLET ORAL at 09:02

## 2025-02-11 RX ADMIN — SENNOSIDES AND DOCUSATE SODIUM 1 TABLET: 50; 8.6 TABLET ORAL at 10:02

## 2025-02-11 RX ADMIN — METOPROLOL TARTRATE 75 MG: 50 TABLET, FILM COATED ORAL at 10:02

## 2025-02-11 RX ADMIN — Medication 200 ML: at 10:02

## 2025-02-11 RX ADMIN — METOPROLOL TARTRATE 75 MG: 50 TABLET, FILM COATED ORAL at 09:02

## 2025-02-11 RX ADMIN — FUROSEMIDE 20 MG: 20 TABLET ORAL at 10:02

## 2025-02-11 RX ADMIN — DILTIAZEM HYDROCHLORIDE 60 MG: 60 TABLET, FILM COATED ORAL at 12:02

## 2025-02-11 RX ADMIN — Medication 200 ML: at 02:02

## 2025-02-11 RX ADMIN — DILTIAZEM HYDROCHLORIDE 30 MG: 30 TABLET, FILM COATED ORAL at 12:02

## 2025-02-11 RX ADMIN — ATORVASTATIN CALCIUM 80 MG: 40 TABLET, FILM COATED ORAL at 10:02

## 2025-02-11 RX ADMIN — DILTIAZEM HYDROCHLORIDE 60 MG: 60 TABLET, FILM COATED ORAL at 11:02

## 2025-02-11 RX ADMIN — DIGOXIN 0.12 MG: 125 TABLET ORAL at 10:02

## 2025-02-11 RX ADMIN — HYDROXYCHLOROQUINE SULFATE 200 MG: 200 TABLET ORAL at 10:02

## 2025-02-11 RX ADMIN — DILTIAZEM HYDROCHLORIDE 30 MG: 30 TABLET, FILM COATED ORAL at 05:02

## 2025-02-11 RX ADMIN — Medication 200 ML: at 12:02

## 2025-02-11 RX ADMIN — DILTIAZEM HYDROCHLORIDE 60 MG: 60 TABLET, FILM COATED ORAL at 05:02

## 2025-02-11 RX ADMIN — APIXABAN 2.5 MG: 2.5 TABLET, FILM COATED ORAL at 09:02

## 2025-02-11 RX ADMIN — POTASSIUM & SODIUM PHOSPHATES POWDER PACK 280-160-250 MG 1 PACKET: 280-160-250 PACK at 10:02

## 2025-02-11 RX ADMIN — LISINOPRIL 40 MG: 20 TABLET ORAL at 10:02

## 2025-02-11 RX ADMIN — METOPROLOL TARTRATE 75 MG: 50 TABLET, FILM COATED ORAL at 12:02

## 2025-02-11 RX ADMIN — APIXABAN 2.5 MG: 2.5 TABLET, FILM COATED ORAL at 10:02

## 2025-02-11 RX ADMIN — Medication 200 ML: at 09:02

## 2025-02-11 RX ADMIN — ASPIRIN 81 MG CHEWABLE TABLET 81 MG: 81 TABLET CHEWABLE at 10:02

## 2025-02-11 NOTE — PLAN OF CARE
Problem: Adult Inpatient Plan of Care  Goal: Plan of Care Review  Outcome: Progressing  Goal: Patient-Specific Goal (Individualized)  Outcome: Progressing  Goal: Readiness for Transition of Care  Outcome: Progressing     Problem: Wound  Goal: Optimal Functional Ability  Outcome: Progressing  Goal: Optimal Pain Control and Function  Outcome: Progressing  Goal: Skin Health and Integrity  Outcome: Progressing     Problem: Fall Injury Risk  Goal: Absence of Fall and Fall-Related Injury  Outcome: Progressing     Problem: Skin Injury Risk Increased  Goal: Skin Health and Integrity  Outcome: Progressing     Problem: Stroke, Ischemic (Includes Transient Ischemic Attack)  Goal: Effective Bowel Elimination  Outcome: Progressing  Goal: Optimal Cerebral Tissue Perfusion  Outcome: Progressing  Goal: Optimal Cognitive Function  Outcome: Progressing  Goal: Optimal Nutrition Intake  Outcome: Progressing  Goal: Effective Oxygenation and Ventilation  Outcome: Progressing  Goal: Safe and Effective Swallow  Outcome: Progressing  Goal: Effective Urinary Elimination  Outcome: Progressing     Problem: Comorbidity Management  Goal: Blood Pressure in Desired Range  Outcome: Progressing     Problem: Enteral Nutrition  Goal: Absence of Aspiration Signs and Symptoms  Outcome: Progressing  Goal: Safe, Effective Therapy Delivery  Outcome: Progressing  Goal: Feeding Tolerance  Outcome: Progressing

## 2025-02-11 NOTE — ASSESSMENT & PLAN NOTE
Patient's most recent phosphorus results are listed below.   Recent Labs     02/09/25  0804 02/10/25  0421 02/11/25  0438   PHOS 2.0* 2.0*  1.9* 1.6*     Plan  - Will treat hypophosphatemia with replacement protocol  - Patient's hypophosphatemia is  monitored

## 2025-02-11 NOTE — ASSESSMENT & PLAN NOTE
2/3 at [facility] sent for stat CT head  -TSH T4 ammonia levels  -a neurology consulted-appreciate recs    Repeat MRI brain  unchanged   EEG   This is an abnormal EEG during stupor state.  The overall degree of disorganization and slowing for given age is suggestive of a moderate to severe encephalopathy, likely a toxic metabolic encephalopathy.  There is no evidence of an epileptic process on this recording.  No seizures were recorded during this study.   Consult GI for PEG tube placement- planned for 2/7

## 2025-02-11 NOTE — ASSESSMENT & PLAN NOTE
Patient has paroxysmal (<7 days) atrial fibrillation. Patient is currently in atrial fibrillation. [id number] Score: 5. The patients heart rate in the last 24 hours is as follows:  Pulse  Min: 61  Max: 150     Antiarrhythmics  metoprolol injection 5 mg, Every 6 hours PRN, Intravenous  metoprolol injection 10 mg, Once, Intravenous  metoprolol tartrate tablet 75 mg, 4 times daily, Per G Tube  diltiaZEM tablet 60 mg, Every 6 hours, Per G Tube    Anticoagulants  apixaban tablet 2.5 mg, 2 times daily, Per G Tube    Plan  - Replete lytes with a goal of K>4, Mg >2  - Patient is not anticoagulated due to recent history of massive stroke, after clearance from Neurology can consider starting patient on anticoagulated on 2/for  - Patient's afib is currently uncontrolled. Will adjust treatment as follows:  Cardiology consulted currently on esmolol drip, digoxin level than load with digoxin    Wean esmolol drip to metoprolol

## 2025-02-11 NOTE — ASSESSMENT & PLAN NOTE
Patient's FSGs are controlled on current medication regimen.  Last A1c reviewed-   Lab Results   Component Value Date    HGBA1C 5.7 (H) 01/29/2025     Most recent fingerstick glucose reviewed-   Recent Labs   Lab 02/10/25  1207 02/10/25  1651 02/11/25  0039 02/11/25  0553   POCTGLUCOSE 169* 113* 131* 136*       Current correctional scale  Low  Maintain anti-hyperglycemic dose as follows-   Antihyperglycemics (From admission, onward)    Start     Stop Route Frequency Ordered    02/03/25 1413  insulin aspart U-100 pen 0-5 Units         -- SubQ Every 6 hours PRN 02/03/25 1313        Hold Oral hypoglycemics while patient is in the hospital.

## 2025-02-11 NOTE — PLAN OF CARE
spoke with Brinda at [facility] [phone]. I updated her PHN was sending LTAC request for medical review. We have not heard back yet.  went to meet with patient on MD rounds. No family at bedside.  will continue to follow patient through transitions of care and assist with any discharge needs.     1205--Marcia reports she has not heard back yet regarding LTAC authorization.    2/7--EGD with PEG Placement    1547-- left voicemail for daughter Adelina to update her. Still awaiting determination on LTAC.    1557--Marcia with PHN reports still no word on auth.    Emergency Contacts    Name Relation Home Work Mobile   Adelina Majano Daughter   [phone]      Other Contacts    Name Relation Home Work Mobile   ahrrison tripp Sister   [phone]   Tania Majano Daughter   [phone]     Future Appointments   Date Time Provider Department Center   2/11/2025 11:30 AM [facility] US1 [facility] ULSOUND [facility]   2/18/2025 11:00 AM Paula Cooper NP [facility] MED [place] [facility]   2/20/2025 11:15 AM OPHTHALMOLOGY TESTING, [facility] OPHTHAL [facility]   2/20/2025 12:30 PM JOANNE Kimbrough IV, MD [facility] OPHTHAL [facility]   4/3/2025  7:50 AM [facility] LAB [facility]   4/10/2025  9:00 AM Paula Cooper NP [facility] MED [place] [facility]   6/16/2025  9:00 AM Pepe Izaguirre MD [facility] DERM [facility]         02/11/25 1051   Rounds   Attendance Provider;Nurse    Discharge Plan A Long-term acute care facility (LTAC)   Why the patient remains in the hospital Requires continued medical care   Transition of Care Barriers None     Naatcha Harrison RN    [phone]

## 2025-02-11 NOTE — ASSESSMENT & PLAN NOTE
Presented on January 28, 2025 with large stroke, patient was not a candidate for tPA versus thrombectomy  Tele stroke was consulted.  The all anticoagulants to be started after in 7 days on February 4th  Neurology consulted at hospitals after arrival on 02/03       Antithrombotics for secondary stroke prevention: Antiplatelets: None:  Large size stroke on 01/28 patient to wait total of 7 days to restart anticoagulants on 2/for    Statins for secondary stroke prevention and hyperlipidemia, if present:   Statins: Atorvastatin- 40 mg daily    Aggressive risk factor modification: HTN, DM, A-Fib, CAD     Rehab efforts: The patient has been evaluated by a stroke team provider and the therapy needs have been fully considered based off the presenting complaints and exam findings. The following therapy evaluations are needed: PT evaluate and treat, OT evaluate and treat, SLP evaluate and treat    Diagnostics ordered/pending: Carotid ultrasound to assess vasculature, CT scan of head without contrast to asses brain parenchyma, CTA Head to assess vasculature , HgbA1C to assess blood glucose levels, Lipid Profile to assess cholesterol levels, MRA head to assess vasculature, MRA neck/arch to assess vasculature, MRI head without contrast to assess brain parenchyma, TTE to assess cardiac function/status , TSH to assess thyroid function    VTE prophylaxis: None: Reason for No Pharmacological VTE Prophylaxis:  Due to large stroke per Neurology recommendation had [facility] patient to be started after 7 days (2/4    BP parameters: Infarct:  Out of the window at this moment to maintain blood pressure within normal values

## 2025-02-11 NOTE — PROGRESS NOTES
[facility] Medicine  Progress Note    Patient Name: Eneida Majano  MRN: [id number]  Patient Class: IP- Inpatient   Admission Date: 2/3/2025  Length of Stay: 8 days  Attending Physician: Fozia Rush MD  Primary Care Provider: Paula Cooper NP        Subjective     Principal Problem:Cerebrovascular accident (CVA) due to embolism of right middle cerebral artery        HPI:  84-year-old female with a history of anemia, atrial fibrillation/flutter (on Eliquis), rheumatoid arthritis, diabetes, gastroesophageal reflux disease, hypertension, hyperlipidemia, and insomnia , pt was transferred to [facility] on January 28 with aphasia/dysarthria, right lateral gaze, and left-sided weakness. CT head showed evidence of a right MCA distribution stroke along with other areas of infarct. CTA of the head and neck showed occlusion of the right M2 branch. She had Vascular Neurology tele-consultation and was felt not to be a thrombolytic candidate. She was also not a thrombectomy candidate with a large core infarct on imaging. MRI brain on 1/29 also showed the areas of infarct. She was admitted with right MCA stroke, atrial flutter/fibrillation with rapid ventricular response, and encephalopathy. Anticoagulation was held due to concern for potential hemorrhagic conversion. Also troponin was elevated and she had echocardiogram that showed EF 28% with decreased right ventricular systolic function and moderate pulmonary hypertension. Mentation improved somewhat by January 31. She was placed on Plavix with plans to resume anticoagulation after 7 days. By February 1, she had increased confusion. She was more tachycardic. On the morning of February 3, tachycardia persisted and she was upgraded in status and started on esmolol infusion. She was seen by Cardiology. On the morning of February 3 she was awake but confused and not following commands. She was unable to swallow oral medicines. Blood  pressure remained stable. Pt was transfer to Hospital Medicine at [facility] in ICU status for continued rate control with esmolol.       On day of transfer to [facility] :  February 3: Sodium 138, potassium 4.1, chloride 108, CO2 19, BUN 13, creatinine 0.8, glucose 125, magnesium 1.9, white blood cells 7.04, hemoglobin 12.8, hematocrit 39.8, platelets 256  -EKG showed atrial fibrillation with ventricular rate 123. T-wave abnormality.    January 31: AST 23, ALT 20  -abdominal ultrasound had no acute findings    January 30: CT head showed redemonstration of acute right MCA infarct. No acute intracranial hemorrhage.  -INR 1.1  -echocardiogram had EF 28%. Unable to assess diastolic function due to atrial fibrillation. Decreased right ventricular systolic function. Right atrium is dilated. Moderate mitral regurgitation. Moderate tricuspid regurgitation. Moderate pulmonary hypertension with estimated PA systolic pressure 60 mmHg. Intermediate venous pressure at mmHg.    January 29: MRI brain had areas of acute infarction in the right MCA distribution largest involving the right perisylvian and lateral right frontal regions and smaller lateral right parieto-occipital cortical infarct with associated localized mass effect including effacement of adjacent portions of the right sylvian fissure and overlying cortical sulci. Non acute posterior left frontal subcortical white matter infarct. Moderate presumed microvascular ischemic change     Overview/Hospital Course:  No notes on file    Interval History: lying in bed, minimally responsive-open eyes to tactile stimuli otherwise unchanged.  No family at the bedside.,  Tolerating tube feeding     S/p PEG tube placement on 2/7- resume tube feed  BP still uncontrolled - uptitrate meds    Urine culture with multiple organism non predominance-discontinue antibiotics    Awaiting placement, if denied by LTAC to consider palliative care consult      Review of Systems  "  Unable to perform ROS: Patient unresponsive     Objective:     Vital Signs (Most Recent):  Temp: 99.8 °F (37.7 °C) (02/11/25 0522)  Pulse: 61 (02/11/25 0740)  Resp: 17 (02/11/25 0740)  BP: (!) 176/96 (02/11/25 0740)  SpO2: 96 % (02/11/25 0740) Vital Signs (24h Range):  Temp:  [98.6 °F (37 °C)-99.8 °F (37.7 °C)] 99.8 °F (37.7 °C)  Pulse:  [] 61  Resp:  [17-19] 17  SpO2:  [92 %-100 %] 96 %  BP: (134-194)/(68-96) 176/96     Weight: 52.4 kg (115 lb 8.3 oz)  Body mass index is 23.33 kg/m².    Intake/Output Summary (Last 24 hours) at 2/11/2025 1110  Last data filed at 2/10/2025 1915  Gross per 24 hour   Intake 40 ml   Output --   Net 40 ml         Physical Exam  Constitutional:       General: She is not in acute distress.     Appearance: She is ill-appearing. She is not toxic-appearing.      Comments: Very cachectic   HENT:      Head: Normocephalic and atraumatic.   Cardiovascular:      Rate and Rhythm: Normal rate. Rhythm irregular.   Pulmonary:      Effort: Pulmonary effort is normal.      Breath sounds: Normal breath sounds.   Abdominal:      General: Abdomen is flat.      Palpations: Abdomen is soft.   Musculoskeletal:         General: No swelling or tenderness.      Cervical back: Normal range of motion.      Right lower leg: No edema.      Left lower leg: No edema.   Skin:     General: Skin is dry.      Capillary Refill: Capillary refill takes less than 2 seconds.      Coloration: Skin is pale.   Neurological:      Mental Status: She is disoriented.             Significant Labs: A1C:   Recent Labs   Lab 10/01/24  0816 01/29/25  2300   HGBA1C 5.8* 5.7*     ABGs:   No results for input(s): "PH", "PCO2", "HCO3", "POCSATURATED", "BE", "TOTALHB", "COHB", "METHB", "O2HB", "POCFIO2", "PO2" in the last 48 hours.    Blood Culture:   No results for input(s): "LABBLOO" in the last 48 hours.    CBC:   Recent Labs   Lab 02/10/25  0421   WBC 8.35   HGB 11.2*   HCT 36.2*        CMP:   Recent Labs   Lab " "02/10/25  0421 02/11/25  0438     138 136   K 4.5  4.4 4.6     100 98   CO2 28  28 29   *  122* 131*   BUN 12  12 14   CREATININE 0.7  0.7 0.8   CALCIUM 9.1  9.1 9.4   ALBUMIN 2.6*  --    ANIONGAP 10  10 9     Cardiac Markers:   No results for input(s): "CKMB", "MYOGLOBIN", "BNP", "TROPISTAT" in the last 48 hours.    Lactic Acid:   No results for input(s): "LACTATE" in the last 48 hours.    Lipase:   No results for input(s): "LIPASE" in the last 48 hours.    Lipid Panel: No results for input(s): "CHOL", "HDL", "LDLCALC", "TRIG", "CHOLHDL" in the last 48 hours.  Magnesium:   Recent Labs   Lab 02/10/25  0421   MG 1.9     Troponin:   No results for input(s): "TROPONINI", "TROPONINIHS" in the last 48 hours.    TSH:   Recent Labs   Lab 02/03/25  1353   TSH 3.156     Urine Culture:   No results for input(s): "LABURIN" in the last 48 hours.    Urine Studies:   No results for input(s): "COLORU", "APPEARANCEUA", "PHUR", "SPECGRAV", "PROTEINUA", "GLUCUA", "KETONESU", "BILIRUBINUA", "OCCULTUA", "NITRITE", "UROBILINOGEN", "LEUKOCYTESUR", "RBCUA", "WBCUA", "BACTERIA", "SQUAMEPITHEL", "HYALINECASTS" in the last 48 hours.    Invalid input(s): "WRIGHTSUR"      Significant Imaging: I have reviewed all pertinent imaging results/findings within the past 24 hours.    Echo Saline Bubble? Yes; Ultrasound enhancing contrast? Yes    Result Date: 1/30/2025    Left Ventricle: The left ventricle is normal in size. Mildly increased   wall thickness. Global hypokinesis present. There is severely reduced   systolic function. Quantitated ejection fraction is 28%. Unable to assess   diastolic function due to atrial fibrillation.    Right Ventricle: Right ventricular enlargement. Systolic function is   reduced.    Left Atrium: Left atrium is severely dilated. Agitated saline study of   the atrial septum is negative after vasalva maneuver, suggesting absence   of intracardiac shunt at the atrial level.    Right Atrium: " Right atrium is dilated.    Mitral Valve: There is moderate regurgitation.  EROA 0.31 cm2    Tricuspid Valve: There is moderate regurgitation.    Pulmonary Artery: There is moderate pulmonary hypertension. The   estimated pulmonary artery systolic pressure is 60 mmHg.    IVC/SVC: Intermediate venous pressure at 8 mmHg.         Assessment and Plan     * Cerebrovascular accident (CVA) due to embolism of right middle cerebral artery  Presented on January 28, 2025 with large stroke, patient was not a candidate for tPA versus thrombectomy  Tele stroke was consulted.  The all anticoagulants to be started after in 7 days on February 4th  Neurology consulted at [facility] after arrival on 02/03       Antithrombotics for secondary stroke prevention: Antiplatelets: None:  Large size stroke on 01/28 patient to wait total of 7 days to restart anticoagulants on 2/for    Statins for secondary stroke prevention and hyperlipidemia, if present:   Statins: Atorvastatin- 40 mg daily    Aggressive risk factor modification: HTN, DM, A-Fib, CAD     Rehab efforts: The patient has been evaluated by a stroke team provider and the therapy needs have been fully considered based off the presenting complaints and exam findings. The following therapy evaluations are needed: PT evaluate and treat, OT evaluate and treat, SLP evaluate and treat    Diagnostics ordered/pending: Carotid ultrasound to assess vasculature, CT scan of head without contrast to asses brain parenchyma, CTA Head to assess vasculature , HgbA1C to assess blood glucose levels, Lipid Profile to assess cholesterol levels, MRA head to assess vasculature, MRA neck/arch to assess vasculature, MRI head without contrast to assess brain parenchyma, TTE to assess cardiac function/status , TSH to assess thyroid function    MRA brain on 01/29  Areas of acute infarction right MCA distribution largest involving the right perisylvian and lateral right frontal regions measuring 4.9 x 2.9  cm and smaller lateral right parietooccipital cortical infarct measuring 2.6 x 2.1 cm with associated localized mass effect including effacement of adjacent portions right sylvian fissure and overlying cortical sulci.     Nonacute posterior left frontal subcortical white matter infarct with small focus overlying cortical involvement.  Moderate presumed microvascular ischemic change white matter.    CT head on 02/03  Evolving right MCA vascular territory infarcts    MRI brain on 2/4  Stable bilateral acute/subacute supratentorial infarcts without significant extension or hemorrhagic conversion.     Background of prominent periventricular white matter changes of chronic microvascular ischemia.    EEG    This is an abnormal EEG during stupor state.  The overall degree of disorganization and slowing for given age is suggestive of a moderate to severe encephalopathy, likely a toxic metabolic encephalopathy.  There is no evidence of an epileptic process on this recording.  No seizures were recorded during this study.   VTE prophylaxis: None: Reason for No Pharmacological VTE Prophylaxis:  Due to large stroke per Neurology recommendation had [facility] patient to be started after 7 days (2/4    BP parameters: Infarct:  Out of the window at this moment to maintain blood pressure within normal values      Awaiting placement, if denied by LTAC please consider palliative care consult    Hypophosphatemia  Patient's most recent phosphorus results are listed below.   Recent Labs     02/09/25  0804 02/10/25  0421 02/11/25  0438   PHOS 2.0* 2.0*  1.9* 1.6*     Plan  - Will treat hypophosphatemia with replacement protocol  - Patient's hypophosphatemia is  monitored       UTI (urinary tract infection)  UA positive  Urine cx-   Urine culture with multiple organism non predominance-deescalate soon    Ceftriaxone deescalate soon      ACP (advance care planning)  Advance Care Planning    Date: 02/04/2025    Code Status  Code  status unknown. we agreed to leave patient as full code until code status is confirmed  by family./     A total of 20 min was spent on advance care planning, goals of care discussion, emotional support, formulating and communicating prognosis and exploring burden/benefit of various approaches of treatment. This discussion occurred on a fully voluntary basis with the verbal consent of the patient and/or family.     Advance Care Planning    Date: 02/05/2025  Primary team and pulmonary crit met with patient family at the bedside, updated on clinical status and imaging reports. Also discussed possible disease trajectory, family open to supportive treatment for now to include peg tube placement and possible LTAC placement. Code status also discussed and family wants to continue present treament regimen but make patient DNR in the event she decline clinically.  Questions and concerns were addressed          Code Status  In light of the patients advanced and life limiting illness,I engaged the the family in a voluntary conversation about the patient's preferences for care  at the very end of life. The patient wishes to have a natural, peaceful death.  Along those lines, the patient does not wish to have CPR or other invasive treatments performed when her heart and/or breathing stops. I communicated to the family that a DNR order would be placed in her medical record to reflect this preference.    A total of 15 min was spent on advance care planning, goals of care discussion, emotional support, formulating and communicating prognosis and exploring burden/benefit of various approaches of treatment. This discussion occurred on a fully voluntary basis with the verbal consent of the patient and/or family.           Acute on chronic heart failure  Cardiology was consulted currently patient is on esmolol drip.  Beta blockers scheduled  Patient is a euvolemic  Patient received loading dose of digoxin for AFib with RVR  uncontrolled  Echo Saline Bubble? Yes; Ultrasound enhancing contrast? Yes    Result Date: 1/30/2025    Left Ventricle: The left ventricle is normal in size. Mildly increased   wall thickness. Global hypokinesis present. There is severely reduced   systolic function. Quantitated ejection fraction is 28%. Unable to assess   diastolic function due to atrial fibrillation.    Right Ventricle: Right ventricular enlargement. Systolic function is   reduced.    Left Atrium: Left atrium is severely dilated. Agitated saline study of   the atrial septum is negative after vasalva maneuver, suggesting absence   of intracardiac shunt at the atrial level.    Right Atrium: Right atrium is dilated.    Mitral Valve: There is moderate regurgitation.  EROA 0.31 cm2    Tricuspid Valve: There is moderate regurgitation.    Pulmonary Artery: There is moderate pulmonary hypertension. The   estimated pulmonary artery systolic pressure is 60 mmHg.    IVC/SVC: Intermediate venous pressure at 8 mmHg.         Acute encephalopathy  2/3 at [facility] sent for stat CT head  -TSH T4 ammonia levels  -a neurology consulted-appreciate recs    Repeat MRI brain  unchanged   EEG   This is an abnormal EEG during stupor state.  The overall degree of disorganization and slowing for given age is suggestive of a moderate to severe encephalopathy, likely a toxic metabolic encephalopathy.  There is no evidence of an epileptic process on this recording.  No seizures were recorded during this study.   Consult GI for PEG tube placement- planned for 2/7    Stroke due to occlusion of right middle cerebral artery  Presented on January 28, 2025 with large stroke, patient was not a candidate for tPA versus thrombectomy  Tele stroke was consulted.  The all anticoagulants to be started after in 7 days on February 4th  Neurology consulted at [facility] after arrival on 02/03       Antithrombotics for secondary stroke prevention: Antiplatelets: None:  Large size  stroke on 01/28 patient to wait total of 7 days to restart anticoagulants on 2/for    Statins for secondary stroke prevention and hyperlipidemia, if present:   Statins: Atorvastatin- 40 mg daily    Aggressive risk factor modification: HTN, DM, A-Fib, CAD     Rehab efforts: The patient has been evaluated by a stroke team provider and the therapy needs have been fully considered based off the presenting complaints and exam findings. The following therapy evaluations are needed: PT evaluate and treat, OT evaluate and treat, SLP evaluate and treat    Diagnostics ordered/pending: Carotid ultrasound to assess vasculature, CT scan of head without contrast to asses brain parenchyma, CTA Head to assess vasculature , HgbA1C to assess blood glucose levels, Lipid Profile to assess cholesterol levels, MRA head to assess vasculature, MRA neck/arch to assess vasculature, MRI head without contrast to assess brain parenchyma, TTE to assess cardiac function/status , TSH to assess thyroid function    VTE prophylaxis: None: Reason for No Pharmacological VTE Prophylaxis:  Due to large stroke per Neurology recommendation had [facility] patient to be started after 7 days (2/4    BP parameters: Infarct:  Out of the window at this moment to maintain blood pressure within normal values        Dysphagia  S/p stroke   Consult SLP-appreciate recs   Consult GI for PEG tube placement   S/p peg tube placed on 2/7  Resume tube feed      Atrial fibrillation with RVR  Patient has paroxysmal (<7 days) atrial fibrillation. Patient is currently in atrial fibrillation. [id number] Score: 5. The patients heart rate in the last 24 hours is as follows:  Pulse  Min: 61  Max: 150     Antiarrhythmics  metoprolol injection 5 mg, Every 6 hours PRN, Intravenous  metoprolol injection 10 mg, Once, Intravenous  metoprolol tartrate tablet 75 mg, 4 times daily, Per G Tube  diltiaZEM tablet 60 mg, Every 6 hours, Per G Tube    Anticoagulants  apixaban tablet  2.5 mg, 2 times daily, Per G Tube    Plan  - Replete lytes with a goal of K>4, Mg >2  - Patient is not anticoagulated due to recent history of massive stroke, after clearance from Neurology can consider starting patient on anticoagulated on 2/for  - Patient's afib is currently uncontrolled. Will adjust treatment as follows:  Cardiology consulted currently on esmolol drip, digoxin level than load with digoxin    Wean esmolol drip to metoprolol      Essential hypertension  Patient's blood pressure range in the last 24 hours was: BP  Min: 134/85  Max: 194/90.The patient's inpatient anti-hypertensive regimen is listed below:  Current Antihypertensives  metoprolol injection 5 mg, Every 6 hours PRN, Intravenous  metoprolol injection 10 mg, Once, Intravenous  metoprolol tartrate tablet 75 mg, 4 times daily, Per G Tube  furosemide tablet 20 mg, Daily, Per G Tube  diltiaZEM tablet 60 mg, Every 6 hours, Per G Tube  lisinopriL tablet 40 mg, Daily, Per G Tube    Plan  - BP is uncontrolled, changes made to the regimen  - controlled current medications are to control AFib RVR    Gastroesophageal reflux disease  On famotidine       T2DM (type 2 diabetes mellitus)  Patient's FSGs are controlled on current medication regimen.  Last A1c reviewed-   Lab Results   Component Value Date    HGBA1C 5.7 (H) 01/29/2025     Most recent fingerstick glucose reviewed-   Recent Labs   Lab 02/10/25  1207 02/10/25  1651 02/11/25  0039 02/11/25  0553   POCTGLUCOSE 169* 113* 131* 136*       Current correctional scale  Low  Maintain anti-hyperglycemic dose as follows-   Antihyperglycemics (From admission, onward)      Start     Stop Route Frequency Ordered    02/03/25 1413  insulin aspart U-100 pen 0-5 Units         -- SubQ Every 6 hours PRN 02/03/25 1313          Hold Oral hypoglycemics while patient is in the hospital.      VTE Risk Mitigation (From admission, onward)           Ordered     apixaban tablet 2.5 mg  2 times daily         02/10/25 0500      IP VTE HIGH RISK PATIENT  Once         02/03/25 1250     Place sequential compression device  Until discontinued         02/03/25 1250                    Discharge Planning   DELORES: 2/12/2025     Code Status: DNR   Medical Readiness for Discharge Date:   Discharge Plan A: Long-term acute care facility (LTAC)   Discharge Delays: None known at this time                    Fozia Avila MD  Department of Hospital Medicine   [facility]

## 2025-02-11 NOTE — ASSESSMENT & PLAN NOTE
Patient's blood pressure range in the last 24 hours was: BP  Min: 134/85  Max: 194/90.The patient's inpatient anti-hypertensive regimen is listed below:  Current Antihypertensives  metoprolol injection 5 mg, Every 6 hours PRN, Intravenous  metoprolol injection 10 mg, Once, Intravenous  metoprolol tartrate tablet 75 mg, 4 times daily, Per G Tube  furosemide tablet 20 mg, Daily, Per G Tube  diltiaZEM tablet 60 mg, Every 6 hours, Per G Tube  lisinopriL tablet 40 mg, Daily, Per G Tube    Plan  - BP is uncontrolled, changes made to the regimen  - controlled current medications are to control AFib RVR

## 2025-02-11 NOTE — PLAN OF CARE
Problem: Diabetes Comorbidity  Goal: Blood Glucose Level Within Targeted Range  Outcome: Progressing     Problem: Fall Injury Risk  Goal: Absence of Fall and Fall-Related Injury  Outcome: Progressing     Problem: Skin Injury Risk Increased  Goal: Skin Health and Integrity  Outcome: Progressing     Problem: Restraint, Nonviolent  Goal: Absence of Harm or Injury  Outcome: Progressing      negative

## 2025-02-11 NOTE — SUBJECTIVE & OBJECTIVE
Interval History: lying in bed, minimally responsive-open eyes to tactile stimuli otherwise unchanged.  No family at the bedside.,  Tolerating tube feeding     S/p PEG tube placement on 2/7- resume tube feed  BP still uncontrolled - uptitrate meds    Urine culture with multiple organism non predominance-discontinue antibiotics    Awaiting placement, if denied by LTAC to consider palliative care consult      Review of Systems   Unable to perform ROS: Patient unresponsive     Objective:     Vital Signs (Most Recent):  Temp: 99.8 °F (37.7 °C) (02/11/25 0522)  Pulse: 61 (02/11/25 0740)  Resp: 17 (02/11/25 0740)  BP: (!) 176/96 (02/11/25 0740)  SpO2: 96 % (02/11/25 0740) Vital Signs (24h Range):  Temp:  [98.6 °F (37 °C)-99.8 °F (37.7 °C)] 99.8 °F (37.7 °C)  Pulse:  [] 61  Resp:  [17-19] 17  SpO2:  [92 %-100 %] 96 %  BP: (134-194)/(68-96) 176/96     Weight: 52.4 kg (115 lb 8.3 oz)  Body mass index is 23.33 kg/m².    Intake/Output Summary (Last 24 hours) at 2/11/2025 1110  Last data filed at 2/10/2025 1915  Gross per 24 hour   Intake 40 ml   Output --   Net 40 ml         Physical Exam  Constitutional:       General: She is not in acute distress.     Appearance: She is ill-appearing. She is not toxic-appearing.      Comments: Very cachectic   HENT:      Head: Normocephalic and atraumatic.   Cardiovascular:      Rate and Rhythm: Normal rate. Rhythm irregular.   Pulmonary:      Effort: Pulmonary effort is normal.      Breath sounds: Normal breath sounds.   Abdominal:      General: Abdomen is flat.      Palpations: Abdomen is soft.   Musculoskeletal:         General: No swelling or tenderness.      Cervical back: Normal range of motion.      Right lower leg: No edema.      Left lower leg: No edema.   Skin:     General: Skin is dry.      Capillary Refill: Capillary refill takes less than 2 seconds.      Coloration: Skin is pale.   Neurological:      Mental Status: She is disoriented.             Significant Labs: A1C:  "  Recent Labs   Lab 10/01/24  0816 01/29/25  2300   HGBA1C 5.8* 5.7*     ABGs:   No results for input(s): "PH", "PCO2", "HCO3", "POCSATURATED", "BE", "TOTALHB", "COHB", "METHB", "O2HB", "POCFIO2", "PO2" in the last 48 hours.    Blood Culture:   No results for input(s): "LABBLOO" in the last 48 hours.    CBC:   Recent Labs   Lab 02/10/25  0421   WBC 8.35   HGB 11.2*   HCT 36.2*        CMP:   Recent Labs   Lab 02/10/25  0421 02/11/25  0438     138 136   K 4.5  4.4 4.6     100 98   CO2 28  28 29   *  122* 131*   BUN 12  12 14   CREATININE 0.7  0.7 0.8   CALCIUM 9.1  9.1 9.4   ALBUMIN 2.6*  --    ANIONGAP 10  10 9     Cardiac Markers:   No results for input(s): "CKMB", "MYOGLOBIN", "BNP", "TROPISTAT" in the last 48 hours.    Lactic Acid:   No results for input(s): "LACTATE" in the last 48 hours.    Lipase:   No results for input(s): "LIPASE" in the last 48 hours.    Lipid Panel: No results for input(s): "CHOL", "HDL", "LDLCALC", "TRIG", "CHOLHDL" in the last 48 hours.  Magnesium:   Recent Labs   Lab 02/10/25  0421   MG 1.9     Troponin:   No results for input(s): "TROPONINI", "TROPONINIHS" in the last 48 hours.    TSH:   Recent Labs   Lab 02/03/25  1353   TSH 3.156     Urine Culture:   No results for input(s): "LABURIN" in the last 48 hours.    Urine Studies:   No results for input(s): "COLORU", "APPEARANCEUA", "PHUR", "SPECGRAV", "PROTEINUA", "GLUCUA", "KETONESU", "BILIRUBINUA", "OCCULTUA", "NITRITE", "UROBILINOGEN", "LEUKOCYTESUR", "RBCUA", "WBCUA", "BACTERIA", "SQUAMEPITHEL", "HYALINECASTS" in the last 48 hours.    Invalid input(s): "WRIGHTSUR"      Significant Imaging: I have reviewed all pertinent imaging results/findings within the past 24 hours.    Echo Saline Bubble? Yes; Ultrasound enhancing contrast? Yes    Result Date: 1/30/2025    Left Ventricle: The left ventricle is normal in size. Mildly increased   wall thickness. Global hypokinesis present. There is severely reduced "   systolic function. Quantitated ejection fraction is 28%. Unable to assess   diastolic function due to atrial fibrillation.    Right Ventricle: Right ventricular enlargement. Systolic function is   reduced.    Left Atrium: Left atrium is severely dilated. Agitated saline study of   the atrial septum is negative after vasalva maneuver, suggesting absence   of intracardiac shunt at the atrial level.    Right Atrium: Right atrium is dilated.    Mitral Valve: There is moderate regurgitation.  EROA 0.31 cm2    Tricuspid Valve: There is moderate regurgitation.    Pulmonary Artery: There is moderate pulmonary hypertension. The   estimated pulmonary artery systolic pressure is 60 mmHg.    IVC/SVC: Intermediate venous pressure at 8 mmHg.

## 2025-02-11 NOTE — ASSESSMENT & PLAN NOTE
Presented on January 28, 2025 with large stroke, patient was not a candidate for tPA versus thrombectomy  Tele stroke was consulted.  The all anticoagulants to be started after in 7 days on February 4th  Neurology consulted at [facility] after arrival on 02/03       Antithrombotics for secondary stroke prevention: Antiplatelets: None:  Large size stroke on 01/28 patient to wait total of 7 days to restart anticoagulants on 2/for    Statins for secondary stroke prevention and hyperlipidemia, if present:   Statins: Atorvastatin- 40 mg daily    Aggressive risk factor modification: HTN, DM, A-Fib, CAD     Rehab efforts: The patient has been evaluated by a stroke team provider and the therapy needs have been fully considered based off the presenting complaints and exam findings. The following therapy evaluations are needed: PT evaluate and treat, OT evaluate and treat, SLP evaluate and treat    Diagnostics ordered/pending: Carotid ultrasound to assess vasculature, CT scan of head without contrast to asses brain parenchyma, CTA Head to assess vasculature , HgbA1C to assess blood glucose levels, Lipid Profile to assess cholesterol levels, MRA head to assess vasculature, MRA neck/arch to assess vasculature, MRI head without contrast to assess brain parenchyma, TTE to assess cardiac function/status , TSH to assess thyroid function    MRA brain on 01/29  Areas of acute infarction right MCA distribution largest involving the right perisylvian and lateral right frontal regions measuring 4.9 x 2.9 cm and smaller lateral right parietooccipital cortical infarct measuring 2.6 x 2.1 cm with associated localized mass effect including effacement of adjacent portions right sylvian fissure and overlying cortical sulci.     Nonacute posterior left frontal subcortical white matter infarct with small focus overlying cortical involvement.  Moderate presumed microvascular ischemic change white matter.    CT head on 02/03  Evolving right  MCA vascular territory infarcts    MRI brain on 2/4  Stable bilateral acute/subacute supratentorial infarcts without significant extension or hemorrhagic conversion.     Background of prominent periventricular white matter changes of chronic microvascular ischemia.    EEG    This is an abnormal EEG during stupor state.  The overall degree of disorganization and slowing for given age is suggestive of a moderate to severe encephalopathy, likely a toxic metabolic encephalopathy.  There is no evidence of an epileptic process on this recording.  No seizures were recorded during this study.   VTE prophylaxis: None: Reason for No Pharmacological VTE Prophylaxis:  Due to large stroke per Neurology recommendation had [facility] patient to be started after 7 days (2/4    BP parameters: Infarct:  Out of the window at this moment to maintain blood pressure within normal values      Awaiting placement, if denied by LTAC please consider palliative care consult

## 2025-02-11 NOTE — ASSESSMENT & PLAN NOTE
Cardiology was consulted currently patient is on esmolol drip.  Beta blockers scheduled  Patient is a euvolemic  Patient received loading dose of digoxin for AFib with RVR uncontrolled  Echo Saline Bubble? Yes; Ultrasound enhancing contrast? Yes    Result Date: 1/30/2025    Left Ventricle: The left ventricle is normal in size. Mildly increased   wall thickness. Global hypokinesis present. There is severely reduced   systolic function. Quantitated ejection fraction is 28%. Unable to assess   diastolic function due to atrial fibrillation.    Right Ventricle: Right ventricular enlargement. Systolic function is   reduced.    Left Atrium: Left atrium is severely dilated. Agitated saline study of   the atrial septum is negative after vasalva maneuver, suggesting absence   of intracardiac shunt at the atrial level.    Right Atrium: Right atrium is dilated.    Mitral Valve: There is moderate regurgitation.  EROA 0.31 cm2    Tricuspid Valve: There is moderate regurgitation.    Pulmonary Artery: There is moderate pulmonary hypertension. The   estimated pulmonary artery systolic pressure is 60 mmHg.    IVC/SVC: Intermediate venous pressure at 8 mmHg.

## 2025-02-12 VITALS
OXYGEN SATURATION: 98 % | SYSTOLIC BLOOD PRESSURE: 137 MMHG | HEIGHT: 59 IN | DIASTOLIC BLOOD PRESSURE: 63 MMHG | RESPIRATION RATE: 16 BRPM | WEIGHT: 115.5 LBS | HEART RATE: 58 BPM | TEMPERATURE: 98 F | BODY MASS INDEX: 23.28 KG/M2

## 2025-02-12 PROBLEM — R79.89 ELEVATED TROPONIN LEVEL NOT DUE TO ACUTE CORONARY SYNDROME: Status: RESOLVED | Noted: 2025-01-29 | Resolved: 2025-02-12

## 2025-02-12 PROBLEM — R17 SERUM TOTAL BILIRUBIN ELEVATED: Status: RESOLVED | Noted: 2025-01-29 | Resolved: 2025-02-12

## 2025-02-12 PROBLEM — I69.391 DYSPHAGIA AS LATE EFFECT OF CEREBROVASCULAR ACCIDENT (CVA): Status: ACTIVE | Noted: 2020-06-16

## 2025-02-12 PROBLEM — I48.92 ATRIAL FLUTTER WITH RAPID VENTRICULAR RESPONSE: Status: RESOLVED | Noted: 2024-10-28 | Resolved: 2025-02-12

## 2025-02-12 LAB
ALBUMIN SERPL BCP-MCNC: 2.5 G/DL (ref 3.5–5.2)
ANION GAP SERPL CALC-SCNC: 7 MMOL/L (ref 8–16)
ANION GAP SERPL CALC-SCNC: 9 MMOL/L (ref 8–16)
BASOPHILS # BLD AUTO: 0.03 K/UL (ref 0–0.2)
BASOPHILS NFR BLD: 0.3 % (ref 0–1.9)
BUN SERPL-MCNC: 16 MG/DL (ref 8–23)
BUN SERPL-MCNC: 16 MG/DL (ref 8–23)
CALCIUM SERPL-MCNC: 9.6 MG/DL (ref 8.7–10.5)
CALCIUM SERPL-MCNC: 9.6 MG/DL (ref 8.7–10.5)
CHLORIDE SERPL-SCNC: 96 MMOL/L (ref 95–110)
CHLORIDE SERPL-SCNC: 97 MMOL/L (ref 95–110)
CO2 SERPL-SCNC: 30 MMOL/L (ref 23–29)
CO2 SERPL-SCNC: 32 MMOL/L (ref 23–29)
CREAT SERPL-MCNC: 0.8 MG/DL (ref 0.5–1.4)
CREAT SERPL-MCNC: 0.8 MG/DL (ref 0.5–1.4)
DIFFERENTIAL METHOD BLD: ABNORMAL
EOSINOPHIL # BLD AUTO: 0.1 K/UL (ref 0–0.5)
EOSINOPHIL NFR BLD: 1 % (ref 0–8)
ERYTHROCYTE [DISTWIDTH] IN BLOOD BY AUTOMATED COUNT: 15.1 % (ref 11.5–14.5)
EST. GFR  (NO RACE VARIABLE): >60 ML/MIN/1.73 M^2
EST. GFR  (NO RACE VARIABLE): >60 ML/MIN/1.73 M^2
FINAL PATHOLOGIC DIAGNOSIS: NORMAL
GLUCOSE SERPL-MCNC: 120 MG/DL (ref 70–110)
GLUCOSE SERPL-MCNC: 122 MG/DL (ref 70–110)
GROSS: NORMAL
HCT VFR BLD AUTO: 31.3 % (ref 37–48.5)
HGB BLD-MCNC: 9.9 G/DL (ref 12–16)
IMM GRANULOCYTES # BLD AUTO: 0.05 K/UL (ref 0–0.04)
IMM GRANULOCYTES NFR BLD AUTO: 0.5 % (ref 0–0.5)
LYMPHOCYTES # BLD AUTO: 0.7 K/UL (ref 1–4.8)
LYMPHOCYTES NFR BLD: 7.2 % (ref 18–48)
Lab: NORMAL
MAGNESIUM SERPL-MCNC: 2.1 MG/DL (ref 1.6–2.6)
MCH RBC QN AUTO: 29 PG (ref 27–31)
MCHC RBC AUTO-ENTMCNC: 31.6 G/DL (ref 32–36)
MCV RBC AUTO: 92 FL (ref 82–98)
MICROSCOPIC EXAM: NORMAL
MONOCYTES # BLD AUTO: 1.1 K/UL (ref 0.3–1)
MONOCYTES NFR BLD: 10.7 % (ref 4–15)
NEUTROPHILS # BLD AUTO: 8.2 K/UL (ref 1.8–7.7)
NEUTROPHILS NFR BLD: 80.3 % (ref 38–73)
NRBC BLD-RTO: 0 /100 WBC
PHOSPHATE SERPL-MCNC: 1.8 MG/DL (ref 2.7–4.5)
PHOSPHATE SERPL-MCNC: 1.9 MG/DL (ref 2.7–4.5)
PLATELET # BLD AUTO: 322 K/UL (ref 150–450)
PMV BLD AUTO: 9.9 FL (ref 9.2–12.9)
POCT GLUCOSE: 117 MG/DL (ref 70–110)
POCT GLUCOSE: 152 MG/DL (ref 70–110)
POCT GLUCOSE: 170 MG/DL (ref 70–110)
POCT GLUCOSE: 180 MG/DL (ref 70–110)
POTASSIUM SERPL-SCNC: 4.3 MMOL/L (ref 3.5–5.1)
POTASSIUM SERPL-SCNC: 4.5 MMOL/L (ref 3.5–5.1)
RBC # BLD AUTO: 3.41 M/UL (ref 4–5.4)
SODIUM SERPL-SCNC: 135 MMOL/L (ref 136–145)
SODIUM SERPL-SCNC: 136 MMOL/L (ref 136–145)
WBC # BLD AUTO: 10.24 K/UL (ref 3.9–12.7)

## 2025-02-12 PROCEDURE — 83735 ASSAY OF MAGNESIUM: CPT | Performed by: INTERNAL MEDICINE

## 2025-02-12 PROCEDURE — 25000003 PHARM REV CODE 250: Performed by: INTERNAL MEDICINE

## 2025-02-12 PROCEDURE — 80069 RENAL FUNCTION PANEL: CPT | Performed by: INTERNAL MEDICINE

## 2025-02-12 PROCEDURE — 85025 COMPLETE CBC W/AUTO DIFF WBC: CPT | Performed by: INTERNAL MEDICINE

## 2025-02-12 PROCEDURE — 63600175 PHARM REV CODE 636 W HCPCS: Performed by: INTERNAL MEDICINE

## 2025-02-12 PROCEDURE — 36415 COLL VENOUS BLD VENIPUNCTURE: CPT

## 2025-02-12 PROCEDURE — 84100 ASSAY OF PHOSPHORUS: CPT

## 2025-02-12 PROCEDURE — 80048 BASIC METABOLIC PNL TOTAL CA: CPT

## 2025-02-12 PROCEDURE — 25000003 PHARM REV CODE 250

## 2025-02-12 PROCEDURE — 94761 N-INVAS EAR/PLS OXIMETRY MLT: CPT

## 2025-02-12 PROCEDURE — 25000003 PHARM REV CODE 250: Performed by: FAMILY MEDICINE

## 2025-02-12 RX ORDER — DIGOXIN 125 MCG
0.12 TABLET ORAL DAILY
Status: CANCELLED | OUTPATIENT
Start: 2025-02-13

## 2025-02-12 RX ORDER — ACETAMINOPHEN 650 MG/20.3ML
650 LIQUID ORAL EVERY 6 HOURS PRN
Status: CANCELLED | OUTPATIENT
Start: 2025-02-12

## 2025-02-12 RX ORDER — ATORVASTATIN CALCIUM 40 MG/1
80 TABLET, FILM COATED ORAL DAILY
Status: CANCELLED | OUTPATIENT
Start: 2025-02-13

## 2025-02-12 RX ORDER — INSULIN ASPART 100 [IU]/ML
0-5 INJECTION, SOLUTION INTRAVENOUS; SUBCUTANEOUS EVERY 6 HOURS PRN
Status: CANCELLED | OUTPATIENT
Start: 2025-02-12

## 2025-02-12 RX ORDER — FAMOTIDINE 20 MG/1
20 TABLET, FILM COATED ORAL DAILY
Status: DISCONTINUED | OUTPATIENT
Start: 2025-02-13 | End: 2025-02-12 | Stop reason: HOSPADM

## 2025-02-12 RX ORDER — CALCIUM GLUCONATE 20 MG/ML
3 INJECTION, SOLUTION INTRAVENOUS
Status: CANCELLED | OUTPATIENT
Start: 2025-02-12

## 2025-02-12 RX ORDER — SODIUM CHLORIDE 0.9 % (FLUSH) 0.9 %
10 SYRINGE (ML) INJECTION
Status: CANCELLED | OUTPATIENT
Start: 2025-02-12

## 2025-02-12 RX ORDER — FUROSEMIDE 20 MG/1
20 TABLET ORAL DAILY
Status: CANCELLED | OUTPATIENT
Start: 2025-02-13

## 2025-02-12 RX ORDER — FAMOTIDINE 20 MG/1
20 TABLET, FILM COATED ORAL DAILY
Status: CANCELLED | OUTPATIENT
Start: 2025-02-13

## 2025-02-12 RX ORDER — GLUCAGON 1 MG
1 KIT INJECTION
Status: CANCELLED | OUTPATIENT
Start: 2025-02-12

## 2025-02-12 RX ORDER — HYDROXYZINE PAMOATE 25 MG/1
25 CAPSULE ORAL EVERY 6 HOURS PRN
Status: CANCELLED | OUTPATIENT
Start: 2025-02-12

## 2025-02-12 RX ORDER — DILTIAZEM HYDROCHLORIDE 60 MG/1
60 TABLET, FILM COATED ORAL EVERY 6 HOURS
Status: CANCELLED | OUTPATIENT
Start: 2025-02-12

## 2025-02-12 RX ORDER — HYDROXYCHLOROQUINE SULFATE 200 MG/1
200 TABLET, FILM COATED ORAL 2 TIMES DAILY
Status: CANCELLED | OUTPATIENT
Start: 2025-02-12

## 2025-02-12 RX ORDER — NAPROXEN SODIUM 220 MG/1
81 TABLET, FILM COATED ORAL DAILY
Status: CANCELLED | OUTPATIENT
Start: 2025-02-13

## 2025-02-12 RX ORDER — CALCIUM GLUCONATE 20 MG/ML
2 INJECTION, SOLUTION INTRAVENOUS
Status: CANCELLED | OUTPATIENT
Start: 2025-02-12

## 2025-02-12 RX ORDER — MAGNESIUM SULFATE HEPTAHYDRATE 40 MG/ML
2 INJECTION, SOLUTION INTRAVENOUS
Status: CANCELLED | OUTPATIENT
Start: 2025-02-12

## 2025-02-12 RX ORDER — ONDANSETRON HYDROCHLORIDE 2 MG/ML
4 INJECTION, SOLUTION INTRAVENOUS EVERY 8 HOURS PRN
Status: CANCELLED | OUTPATIENT
Start: 2025-02-12

## 2025-02-12 RX ORDER — METOPROLOL TARTRATE 1 MG/ML
5 INJECTION, SOLUTION INTRAVENOUS EVERY 6 HOURS PRN
Status: CANCELLED | OUTPATIENT
Start: 2025-02-12

## 2025-02-12 RX ORDER — SODIUM CHLORIDE 0.9 % (FLUSH) 0.9 %
10 SYRINGE (ML) INJECTION EVERY 12 HOURS PRN
Status: CANCELLED | OUTPATIENT
Start: 2025-02-12

## 2025-02-12 RX ORDER — SCOPOLAMINE 1 MG/3D
1 PATCH, EXTENDED RELEASE TRANSDERMAL
Status: CANCELLED | OUTPATIENT
Start: 2025-02-15

## 2025-02-12 RX ORDER — SODIUM,POTASSIUM PHOSPHATES 280-250MG
2 POWDER IN PACKET (EA) ORAL ONCE
Status: COMPLETED | OUTPATIENT
Start: 2025-02-12 | End: 2025-02-12

## 2025-02-12 RX ORDER — HALOPERIDOL 5 MG/ML
1 INJECTION INTRAMUSCULAR EVERY 6 HOURS PRN
Status: CANCELLED | OUTPATIENT
Start: 2025-02-12

## 2025-02-12 RX ORDER — CALCIUM GLUCONATE 20 MG/ML
1 INJECTION, SOLUTION INTRAVENOUS
Status: CANCELLED | OUTPATIENT
Start: 2025-02-12

## 2025-02-12 RX ORDER — LISINOPRIL 20 MG/1
40 TABLET ORAL DAILY
Status: CANCELLED | OUTPATIENT
Start: 2025-02-13

## 2025-02-12 RX ORDER — BISACODYL 10 MG/1
10 SUPPOSITORY RECTAL DAILY PRN
Status: CANCELLED | OUTPATIENT
Start: 2025-02-12

## 2025-02-12 RX ORDER — AMOXICILLIN 250 MG
1 CAPSULE ORAL 2 TIMES DAILY
Status: CANCELLED | OUTPATIENT
Start: 2025-02-12

## 2025-02-12 RX ADMIN — DIGOXIN 0.12 MG: 125 TABLET ORAL at 09:02

## 2025-02-12 RX ADMIN — APIXABAN 2.5 MG: 2.5 TABLET, FILM COATED ORAL at 09:02

## 2025-02-12 RX ADMIN — DILTIAZEM HYDROCHLORIDE 60 MG: 60 TABLET, FILM COATED ORAL at 05:02

## 2025-02-12 RX ADMIN — FAMOTIDINE 20 MG: 10 INJECTION, SOLUTION INTRAVENOUS at 09:02

## 2025-02-12 RX ADMIN — Medication 200 ML: at 10:02

## 2025-02-12 RX ADMIN — ATORVASTATIN CALCIUM 80 MG: 40 TABLET, FILM COATED ORAL at 09:02

## 2025-02-12 RX ADMIN — LISINOPRIL 40 MG: 20 TABLET ORAL at 09:02

## 2025-02-12 RX ADMIN — Medication 200 ML: at 01:02

## 2025-02-12 RX ADMIN — METOPROLOL TARTRATE 75 MG: 50 TABLET, FILM COATED ORAL at 09:02

## 2025-02-12 RX ADMIN — FUROSEMIDE 20 MG: 20 TABLET ORAL at 09:02

## 2025-02-12 RX ADMIN — SENNOSIDES AND DOCUSATE SODIUM 1 TABLET: 50; 8.6 TABLET ORAL at 08:02

## 2025-02-12 RX ADMIN — HYDROXYCHLOROQUINE SULFATE 200 MG: 200 TABLET ORAL at 08:02

## 2025-02-12 RX ADMIN — METOPROLOL TARTRATE 75 MG: 50 TABLET, FILM COATED ORAL at 12:02

## 2025-02-12 RX ADMIN — POTASSIUM & SODIUM PHOSPHATES POWDER PACK 280-160-250 MG 2 PACKET: 280-160-250 PACK at 12:02

## 2025-02-12 RX ADMIN — APIXABAN 2.5 MG: 2.5 TABLET, FILM COATED ORAL at 08:02

## 2025-02-12 RX ADMIN — ASPIRIN 81 MG CHEWABLE TABLET 81 MG: 81 TABLET CHEWABLE at 09:02

## 2025-02-12 RX ADMIN — Medication 200 ML: at 05:02

## 2025-02-12 RX ADMIN — HYDROXYCHLOROQUINE SULFATE 200 MG: 200 TABLET ORAL at 09:02

## 2025-02-12 RX ADMIN — METOPROLOL TARTRATE 75 MG: 50 TABLET, FILM COATED ORAL at 05:02

## 2025-02-12 RX ADMIN — SENNOSIDES AND DOCUSATE SODIUM 1 TABLET: 50; 8.6 TABLET ORAL at 09:02

## 2025-02-12 RX ADMIN — DILTIAZEM HYDROCHLORIDE 60 MG: 60 TABLET, FILM COATED ORAL at 11:02

## 2025-02-12 RX ADMIN — HALOPERIDOL LACTATE 1 MG: 5 INJECTION, SOLUTION INTRAMUSCULAR at 05:02

## 2025-02-12 RX ADMIN — SCOPOLAMINE 1 PATCH: 1.5 PATCH, EXTENDED RELEASE TRANSDERMAL at 02:02

## 2025-02-12 NOTE — CONSULTS
Palliative Medicine  Consult Note     Patient Name: Eneida Majano   MRN: [id number]   Admission Date: 2/3/2025   Hospital Length of Stay: 9   Attending Provider: Fozia Rush MD   Consulting Provider: Olamide Wheeler MD  Primary Care Physician: Paula Cooper NP   Principal Problem: Cerebrovascular accident (CVA) due to embolism of right middle cerebral artery     Patient information was obtained from relative(s), past medical records, and ER records.      Inpatient consult to Palliative Care  Consult performed by: Olamide Wheeler MD  Consult ordered by: Fozia Rush MD  Reason for consult: goals of care and symptom management  Assessment/Recommendations:     - visited Ms. Majano at bedside, she is unfortunately unable to participate in history due to her underlying medical conditions.  - called pt's daughter Adelina to introduce palliative care, hoping to set up a time to speak with both of the patient's daughters about where we go from here  - after concluding call with Adelina, heard from case management that LTAC was unfortunately denied  - I do think that hospice services would best serve Ms. Majano, but have yet to discuss this with her family  - Thankfully, her symptom burden is low today, appears comfortable, no updates to medication recs        Assessment/Plan:      Palliative Care Encounter:    Impression:  Ms. Eneida Majano is a 83yo woman with hx RA, osteoporosis, HTN, T2DM, Afib on Eliquis, initially presenting to [facility] ED for aphasia, dysarthria, left side weakness and right lateral gaze, found to have a new right MCA stroke. She was determined to not be a candidate for thrombolysis at that time and was transferred to [facility]. She had improved somewhat per notes by Jan 31, but then on Feb 1 had increased confusion and tachycardia requiring esmolol drip. She was transferred then [place] [facility] [place]. Ultimately she achieved rate control on diltiazem and digoxin via PEG  tube which was placed on 2/7 after being evaluated by speech therapy for diet and being found to be unable to tolerate any PO intake. She received treatment for a urinary tract infection. Primary team has initiated transfer back to [facility] today.    She is unfortunately minimally responsive at this point and dependent in all ADLs and on permanent artificial nutrition. While there is a small chance she may experience limited improvement in her mental status over the coming months, I believe the most likely outcome is that she will remain dependent on others for her ADLs and not be able to eat again by mouth. I was able to reach Adelina via telephone today to briefly introduce palliative care and that regardless of whether LTAC was approved, that our team could be a good resource for her mom and family. After I got off the phone with her, I learned that LTAC was denied and that patient was transferring back to [facility]. At this stage, I believe the most helpful resource would be the services of a hospice care agency either at home or with placement in a nursing facility, but I have not been able to discuss this with Ms. Majano' family yet.    Palliative care consulted for assistance with goals of care planning.     Advance Care Planning   Advance Directives:   [place]: No    Do Not Resuscitate Status: Yes    Agent's Name:  Next of kin daughters Kayley   Agent's Contact Number:  [phone]    Decision Making:  Family answered questions  Goals of Care: The family endorses that what is most important right now is to focus on symptom control and figuring out a plan for where we go from here. Unfortunately was not able to speak for very long with family and they will not be able to be in person for another few days. Primary team is planning transfer to [facility] today.     Accordingly, we have decided that the best plan to meet the patient's goals includes continuing with treatment and discussion  about next steps.    - Prior experience with serious illness: no  -The patient's family has previously engaged in advance care planning or GOC discussions  - Insight/Understanding of illness: Based on our limited conversation today, Adelina is aware of her mom's diagnosis and the discussion of where to go from here.     Life Limiting Diagnosis:  New MCA stroke with resulting significant debility, total ADL dependence, PEG tube dependence  -Prognosis-Time and potential for recovery: Unfortunately I believe prognosis is very poor with her imaging findings and based on her mental status now several days out from her stroke. I believe that her prognosis if goals are entirely comfort focused is less than 6 months and that she is eligible for hospice care at routine home care level either at home or in a nursing facility.  -Functional status: fair.  Pt was able to manage ADLs prior to this stroke and hospital stay  -Dementia diagnosis no    Symptom Management:  Unable to fully assess due to patient's mental status.     Summary of recommendations and follow up plan:  -Most important goals at this time: continued discussions about care goals.   -Code status: DNR/DNI  -Disposition: Back to [facility] for now.    The above recommendations communicated directly to primary team on 2/12/2025.     Thank you for your consult. I will follow-up with patient. Please contact us if you have any additional questions.       Subjective:     HPI/Hospital Course: See above.    Review of Symptoms      Symptom Assessment (ESAS 0-10 Scale)  Unable to complete assessment due to Patient nonverbal     Constipation:  Negative  Diarrhea:  Negative      Pain Assessment in Advanced Demential Scale (PAINAD)   Breathing - Independent of vocalization:  1 (Pt does not carry dementia diagnosis, but is non-verbal.)  Negative vocalization:  1  Facial expression:  0  Body language:  0  Consolability:  0  Total:  2    Performance Status:   10    Psychosocial/Cultural:   See Palliative Psychosocial Note: No  Social Issues Identified: New Diagnosis/Trauma  Bereavement Risk: No  Caregiver Needs Discussed. Caregiver Distress: No: Caregiver support and community resources discussed.    Cultural: no needs identified today.  **Primary  to Follow**  Palliative Care  Consult: No     Time-Based Charting:  Yes  Chart Review: 30 minutes  Face to Face: 20 minutes  Advance Care Plannin minutes    Total Time Spent: 70 minutes      ROS:  Review of Systems   Unable to perform ROS    Past Medical History:   Diagnosis Date    Acid reflux     Anemia     Arthritis     Atrial fibrillation     Carpal tunnel syndrome     Cataract     Dry mouth     GERD (gastroesophageal reflux disease)     Hyperlipidemia     Hypertension     Insomnia     Other osteoporosis without current pathological fracture 3/14/2018    PONV (postoperative nausea and vomiting)     short term     Past Surgical History:   Procedure Laterality Date    APPENDECTOMY      CARDIOVERSION N/A 2019    Procedure: Cardioversion;  Surgeon: Emmanuel Matthew MD;  Location: [facility] CATH LAB;  Service: Cardiology;  Laterality: N/A;    COLONOSCOPY      COLONOSCOPY N/A 2020    Procedure: COLONOSCOPY;  Surgeon: Gopi Brooke MD;  [facility];  Service: Endoscopy;  Laterality: N/A;    COLONOSCOPY N/A 2020    Procedure: COLONOSCOPY;  Surgeon: Gopi Brooke MD;  Location: [facility] hospital;  Service: Endoscopy;  Laterality: N/A;    ESOPHAGOGASTRODUODENOSCOPY N/A 2020    Procedure: EGD (ESOPHAGOGASTRODUODENOSCOPY);  Surgeon: Gopi Brooke MD;  [facility];  Service: Endoscopy;  Laterality: N/A;    ESOPHAGOGASTRODUODENOSCOPY W/ PEG N/A 2025    Procedure: EGD, WITH PEG TUBE INSERTION;  Surgeon: Sidney Mason MD;  Location: [facility];  Service: Endoscopy;  Laterality: N/A;    FOOT SURGERY Left     HAND SURGERY      HERNIA REPAIR      x2     HYSTERECTOMY  age 32    fibroids    OOPHORECTOMY       Family History   Problem Relation Name Age of Onset    Arthritis Father      Cancer Father          liver    Leukemia Mother      Diabetes Sister      Cancer Sister          throat    Heart disease Sister      Heart disease Brother      Asthma Daughter      Colon cancer Neg Hx       Review of patient's allergies indicates:   Allergen Reactions    Asa [aspirin] Nausea Only     Medications:    Current Facility-Administered Medications:     acetaminophen oral solution 650 mg, 650 mg, Per G Tube, Q6H PRN, Andre Tucker NP, 650 mg at 02/08/25 2202    apixaban tablet 2.5 mg, 2.5 mg, Per G Tube, BID, Chhaya Vilchis MD, 2.5 mg at 02/12/25 0956    aspirin chewable tablet 81 mg, 81 mg, Per G Tube, Daily, Chhaya Vilchis MD, 81 mg at 02/12/25 0956    atorvastatin tablet 80 mg, 80 mg, Per G Tube, Daily, Chhaya Vilchis MD, 80 mg at 02/12/25 0956    bisacodyL suppository 10 mg, 10 mg, Rectal, Daily PRN, Marco Fiore MD    calcium gluconate 1 g in NS IVPB (premixed), 1 g, Intravenous, PRN, Fozia Rush MD    calcium gluconate 1 g in NS IVPB (premixed), 2 g, Intravenous, PRN, Fozia Rush MD    calcium gluconate 1 g in NS IVPB (premixed), 3 g, Intravenous, PRN, Fozia Rush MD    dextrose 50% injection 12.5 g, 12.5 g, Intravenous, PRN, Fozia Rush MD, 12.5 g at 02/08/25 1328    digoxin tablet 0.125 mg, 0.125 mg, Per G Tube, Daily, Chhaya Vilchis MD, 0.125 mg at 02/12/25 0956    diltiaZEM tablet 60 mg, 60 mg, Per G Tube, Q6H, Fozia Rush MD, 60 mg at 02/12/25 1157    [START ON 2/13/2025] famotidine tablet 20 mg, 20 mg, Per G Tube, Daily, Fozia Rush MD    furosemide tablet 20 mg, 20 mg, Per G Tube, Daily, Innocent-ItuaChhaya barnes MD, 20 mg at 02/12/25 0956    glucagon (human recombinant) injection 1 mg, 1 mg, Intramuscular, PRN, Fozia Rush MD    haloperidol lactate injection 1  mg, 1 mg, Intravenous, Q6H PRN, Marco Fiore MD, 1 mg at 02/10/25 0308    hydroxychloroquine tablet 200 mg, 200 mg, Per G Tube, BID, Chhaya Vilchis MD, 200 mg at 02/12/25 0956    hydrOXYzine pamoate capsule 25 mg, 25 mg, Per G Tube, Q6H PRN, Chhaya Vilchis MD    insulin aspart U-100 pen 0-5 Units, 0-5 Units, Subcutaneous, Q6H PRN, Fozia Rush MD    lisinopriL tablet 40 mg, 40 mg, Per G Tube, Daily, Fozia Rush MD, 40 mg at 02/12/25 0955    magnesium sulfate 2g in water 50mL IVPB (premix), 2 g, Intravenous, PRN, Fozia Rush MD    magnesium sulfate 2g in water 50mL IVPB (premix), 2 g, Intravenous, PRN, Fozia Rush MD    metoprolol injection 10 mg, 10 mg, Intravenous, Once, Chhaya Vilchis MD    metoprolol injection 5 mg, 5 mg, Intravenous, Q6H PRN, Caitlin Anton MD, 5 mg at 02/08/25 0604    metoprolol tartrate tablet 75 mg, 75 mg, Per G Tube, QID, Chhaya Vilchis MD, 75 mg at 02/12/25 1200    ondansetron injection 4 mg, 4 mg, Intravenous, Q8H PRN, Fozia Rush MD    scopolamine 1.3-1.5 mg (1 mg over 3 days) 1 patch, 1 patch, Transdermal, Q3 Days, Zion Tijerina MD, 1 patch at 02/12/25 0248    senna-docusate 8.6-50 mg per tablet 1 tablet, 1 tablet, Per G Tube, BID, Chhaya Vilchis MD, 1 tablet at 02/12/25 0956    sodium chloride 0.9% flush 10 mL, 10 mL, Intravenous, PRN, Fozia Rush MD    Flushing PICC/Midline Protocol, , , Until Discontinued **AND** sodium chloride 0.9% flush 10 mL, 10 mL, Intravenous, Q12H PRN, Jaden Merritt MD    sodium phosphate 15 mmol in D5W 250 mL IVPB, 15 mmol, Intravenous, PRN, Fozia Rush MD    sodium phosphate 20.01 mmol in D5W 250 mL IVPB, 20.01 mmol, Intravenous, PRN, Fozia Rush MD    sodium phosphate 30 mmol in D5W 250 mL IVPB, 30 mmol, Intravenous, PRN, Fozia Rush MD    Objective:      Physical Exam:  Vitals: Temp: 97.6 °F (36.4 °C) (02/12/25 1122)  Pulse: 69  (02/12/25 1122)  Resp: 20 (02/12/25 1122)  BP: (!) 155/72 (02/12/25 1122)  SpO2: 98 % (02/12/25 1122)    Physical Exam  Constitutional:       Comments: Elderly woman lying in bed, minimally responsive. Mittens on.   HENT:      Head: Normocephalic and atraumatic.      Mouth/Throat:      Mouth: Mucous membranes are moist.      Pharynx: No oropharyngeal exudate.   Eyes:      Comments: Does not open eyes to voice or touch.   Cardiovascular:      Rate and Rhythm: Normal rate and regular rhythm.   Pulmonary:      Effort: Pulmonary effort is normal.      Breath sounds: Normal breath sounds.      Comments: On room air.  Abdominal:      Palpations: Abdomen is soft.      Comments: Mildly distended abdomen, PEG tube in place with feeds running 40cc/hr.    Skin:     General: Skin is warm and dry.   Neurological:      Comments: Minimally responsive. Groans when touched, does not speak. Unable to participate in therapy disciplines.         Labs:   Creatinine   Date Value Ref Range Status   02/12/2025 0.8 0.5 - 1.4 mg/dL Final      Hemoglobin   Date Value Ref Range Status   02/12/2025 9.9 (L) 12.0 - 16.0 g/dL Final      Albumin   Date Value Ref Range Status   02/12/2025 2.5 (L) 3.5 - 5.2 g/dL Final   02/10/2025 2.6 (L) 3.5 - 5.2 g/dL Final   02/08/2025 2.3 (L) 3.5 - 5.2 g/dL Final      Imaging: Right side MCA stroke, evidence of chronic microvascular ischemia and watershed infarct in left WARREN/PCA territory. No hemorrhagic conversion on MRI 2/4.     Thank you for the opportunity to care for this patient and family.       Olamide Wheeler MD

## 2025-02-12 NOTE — PLAN OF CARE
Outside Transfer Acceptance Note / [facility]erral [facility]    Referring facility: [facility]   Referring provider: SHERLEY SOOD  Accepting facility: [facility]  Accepting provider: JOSEPH MASTERS  Admitting provider: YAW  Reason for transfer: Higher Level of Care   Transfer diagnosis: Afib with RVR  Transfer specialty requested: Hospital Medicine  Transfer specialty notified: Yes  Transfer level: NUMBER 1-5: 2  Bed type requested: Med-tele  Isolation status: No active isolations   Admission class or status: IP- Inpatient  IP- Inpatient  IP- Inpatient      Narrative     84F AF/AFL (on Eliquis), RA, HTN, HLD, DM2, GERD initially presenting to [facility] ED and being transferred to [facility] on January 28 with aphasia/dysarthria, right lateral gaze, and left-sided weakness.  CT head showed evidence of a right MCA distribution stroke along with other areas of infarct.  CTA of the head and neck showed occlusion of the right M2 branch.  She had Vascular Neurology tele-consultation and was felt not to be a thrombolytic candidate.  She was also not a thrombectomy candidate with a large core infarct on imaging.  MRI brain also showed the areas of infarct.  She was admitted with right MCA stroke, atrial flutter/fibrillation with rapid ventricular response, and encephalopathy.  Anticoagulation was held due to concern for potential hemorrhagic conversion.  Of note, troponin was elevated and she had echocardiogram that showed EF 28% with decreased right ventricular systolic function and moderate pulmonary hypertension.  Mentation improved somewhat by January 31.  She was placed on Plavix with plans to resume anticoagulation after 7 days.  By February 1, she had increased confusion.  She was more tachycardic.  On the morning of February 3, tachycardia persisted and she was upgraded in status and started on esmolol infusion.  She was seen by Cardiology.  On the morning of February 3 she was awake but  confused and not following commands.  She was unable to swallow oral medicines.  Blood pressure remained stable.  [facility] does not have capability to continue management with esmolol infusion.  Case discussed with Cardiology.  Will plan transfer to Hospital Medicine at [facility] in ICU status for continued rate control with esmolol.  She will need Cardiology consultation for additional management.  She would also benefit neurology consultation.     Patient ultimately transferred to [place] on 2/3. On 2/12 [place] is initiating return transfer to [facility]. The patient has been started back on A/C and rate is now controlled on diltiazem via PEG as well as digoxin. The patient is on GDMT for HFrEF. The patient's encephalopathy did not improve despite treatment for acute cystitis, which was suspected to be contributing. The patient had SLP evaluation and deemed not appropriate for PO and thus PEG tube placement was done 2/7. Transfer is being sought for placement and to guide further [facility] discussion.    Objective     Vitals: Temp: 98.5 °F (36.9 °C) (02/12/25 0737)  Pulse: 84 (02/12/25 0738)  Resp: 18 (02/12/25 0737)  BP: 132/69 (02/12/25 0737)  SpO2: 98 % (02/12/25 0748)  Recent Labs: All pertinent labs within the past 24 hours have been reviewed.  Recent imaging: See above   Airway:     Vent settings:         IV access:        Midline Catheter - Single Lumen 02/04/25 2140 Left brachial vein other (see comments) (Active)   $ Midline Charges (Upon insertion) Pt 3+ Years Old - Bedside Insertion Performed;Midline Catheter (Supply) 02/04/25 2153   Site Assessment Clean;Dry;Intact 02/12/25 0914   IV Device Securement catheter securement device 02/12/25 0914   Line Status Saline locked 02/12/25 0914   Dressing Type CHG impregnated dressing/sponge 02/12/25 0914   Dressing Status Clean;Dry;Intact 02/12/25 0914   Dressing Intervention Integrity maintained 02/12/25 0914   Dressing Change Due 02/19/25 02/12/25 0914   Site  Change Due 02/19/25 02/12/25 0914   Reason Not Rotated Not due 02/12/25 0914     Infusions: See above  Allergies:   Review of patient's allergies indicates:   Allergen Reactions    Asa [aspirin] Nausea Only      NPO: Yes    Anticoagulation:   Anticoagulants       Ordered     Route Frequency Start Stop    02/10/25 1358  Eliquis         PER G TUBE 2 times daily 02/10/25 2100 --             Instructions      [facility]  Admit to Hospital Medicine  Upon patient arrival to floor, please contact Hospital Medicine on call.

## 2025-02-12 NOTE — ASSESSMENT & PLAN NOTE
Presented on January 28, 2025 with large stroke, patient was not a candidate for tPA versus thrombectomy  Tele stroke was consulted.  The all anticoagulants to be started after in 7 days on February 4th  Neurology consulted at [facility] after arrival on 02/03       Antithrombotics for secondary stroke prevention: Antiplatelets: None:  Large size stroke on 01/28 patient to wait total of 7 days to restart anticoagulants on 2/for    Statins for secondary stroke prevention and hyperlipidemia, if present:   Statins: Atorvastatin- 40 mg daily    Aggressive risk factor modification: HTN, DM, A-Fib, CAD     Rehab efforts: The patient has been evaluated by a stroke team provider and the therapy needs have been fully considered based off the presenting complaints and exam findings. The following therapy evaluations are needed: PT evaluate and treat, OT evaluate and treat, SLP evaluate and treat    Diagnostics ordered/pending: Carotid ultrasound to assess vasculature, CT scan of head without contrast to asses brain parenchyma, CTA Head to assess vasculature , HgbA1C to assess blood glucose levels, Lipid Profile to assess cholesterol levels, MRA head to assess vasculature, MRA neck/arch to assess vasculature, MRI head without contrast to assess brain parenchyma, TTE to assess cardiac function/status , TSH to assess thyroid function    MRA brain on 01/29  Areas of acute infarction right MCA distribution largest involving the right perisylvian and lateral right frontal regions measuring 4.9 x 2.9 cm and smaller lateral right parietooccipital cortical infarct measuring 2.6 x 2.1 cm with associated localized mass effect including effacement of adjacent portions right sylvian fissure and overlying cortical sulci.     Nonacute posterior left frontal subcortical white matter infarct with small focus overlying cortical involvement.  Moderate presumed microvascular ischemic change white matter.    CT head on 02/03  Evolving right  MCA vascular territory infarcts    MRI brain on 2/4  Stable bilateral acute/subacute supratentorial infarcts without significant extension or hemorrhagic conversion.     Background of prominent periventricular white matter changes of chronic microvascular ischemia.    EEG    This is an abnormal EEG during stupor state.  The overall degree of disorganization and slowing for given age is suggestive of a moderate to severe encephalopathy, likely a toxic metabolic encephalopathy.  There is no evidence of an epileptic process on this recording.  No seizures were recorded during this study.   VTE prophylaxis: None: Reason for No Pharmacological VTE Prophylaxis:  Due to large stroke per Neurology recommendation had [facility] patient to be started after 7 days (2/4    BP parameters: Infarct:  Out of the window at this moment to maintain blood pressure within normal values      Awaiting placement and transfer back to [facility],   denied by LTAC .

## 2025-02-12 NOTE — PROGRESS NOTES
Pharmacist Intervention IV to PO Note    Eneida Majano is a 84 y.o. female being treated with IV medication famotidine    Patient Data:    Vital Signs (Most Recent):  Temp: 98.5 °F (36.9 °C) (02/12/25 0737)  Pulse: (!) 114 (02/12/25 1025)  Resp: 18 (02/12/25 0737)  BP: 132/69 (02/12/25 0737)  SpO2: 98 % (02/12/25 0748) Vital Signs (72h Range):  Temp:  [97 °F (36.1 °C)-100.5 °F (38.1 °C)]   Pulse:  []   Resp:  [16-22]   BP: (127-194)/()   SpO2:  [92 %-100 %]      CBC:  Recent Labs   Lab 02/08/25  0420 02/10/25  0421 02/12/25  0459   WBC 6.62 8.35 10.24   RBC 4.22 3.99* 3.41*   HGB 11.9* 11.2* 9.9*   HCT 38.3 36.2* 31.3*    308 322   MCV 91 91 92   MCH 28.2 28.1 29.0   MCHC 31.1* 30.9* 31.6*     CMP:     Recent Labs   Lab 02/08/25  0420 02/08/25  1345 02/10/25  0421 02/11/25  0438 02/12/25  0458 02/12/25  0459   *  262*   < > 123*  122* 131* 122* 120*   CALCIUM 8.5*  8.5*   < > 9.1  9.1 9.4 9.6 9.6   ALBUMIN 2.3*  --  2.6*  --   --  2.5*     135*   < > 139  138 136 135* 136   K 4.2  4.1   < > 4.5  4.4 4.6 4.3 4.5   CO2 23  25   < > 28  28 29 32* 30*     103   < > 101  100 98 96 97   BUN 15  14   < > 12  12 14 16 16   CREATININE 0.9  0.9   < > 0.7  0.7 0.8 0.8 0.8    < > = values in this interval not displayed.       Dietary Orders:  Diet Orders            Tube Feedings/Formulas [facility]; Isosource 1.5 Lexa; 40; Gastrostomy; Tube Feeding Bag: Tube Feeding (I) starting at 02/08 1240    Diet NPO: NPO starting at 02/07 0001    Dietary nutrition supplements By Mouth; BID; Boost Plus Nutritional Drink - Any flavor starting at 02/03 1312            Based on the following criteria, this patient qualifies for intravenous to oral conversion:  [x] The patients gastrointestinal tract is functioning (tolerating medications via oral or enteral route for 24 hours and tolerating food or enteral feeds for 24 hours).  [x] The patient is hemodynamically stable for 24  hours (heart rate <100 beats per minute, systolic blood pressure >99 mm Hg, and respiratory rate <20 breaths per minute).  [x] The patient shows clinical improvement (afebrile for at least 24 hours and white blood cell count downtrending or normalized). Additionally, the patient must be non-neutropenic (absolute neutrophil count >500 cells/mm3).  [x] For antimicrobials, the patient has received IV therapy for at least 24 hours.    IV medication pepcid will be changed to oral medication pepcid    Pharmacist's Name: Jeff Bradley  Pharmacist's Extension: [phone]

## 2025-02-12 NOTE — ASSESSMENT & PLAN NOTE
Patient's blood pressure range in the last 24 hours was: BP  Min: 127/69  Max: 162/91.The patient's inpatient anti-hypertensive regimen is listed below:  Current Antihypertensives  metoprolol injection 5 mg, Every 6 hours PRN, Intravenous  metoprolol injection 10 mg, Once, Intravenous  metoprolol tartrate tablet 75 mg, 4 times daily, Per G Tube  furosemide tablet 20 mg, Daily, Per G Tube  diltiaZEM tablet 60 mg, Every 6 hours, Per G Tube  lisinopriL tablet 40 mg, Daily, Per G Tube    Plan  - BP is uncontrolled, changes made to the regimen  - controlled current medications are to control AFib RVR

## 2025-02-12 NOTE — PLAN OF CARE
Problem: Adult Inpatient Plan of Care  Goal: Plan of Care Review  Outcome: Progressing  Goal: Patient-Specific Goal (Individualized)  Outcome: Progressing  Goal: Absence of Hospital-Acquired Illness or Injury  Outcome: Progressing  Goal: Readiness for Transition of Care  Outcome: Progressing     Problem: Diabetes Comorbidity  Goal: Blood Glucose Level Within Targeted Range  Outcome: Progressing     Problem: Wound  Goal: Absence of Infection Signs and Symptoms  Outcome: Progressing  Goal: Optimal Pain Control and Function  Outcome: Progressing  Goal: Skin Health and Integrity  Outcome: Progressing     Problem: Fall Injury Risk  Goal: Absence of Fall and Fall-Related Injury  Outcome: Progressing     Problem: Skin Injury Risk Increased  Goal: Skin Health and Integrity  Outcome: Progressing     Problem: Stroke, Ischemic (Includes Transient Ischemic Attack)  Goal: Effective Bowel Elimination  Outcome: Progressing  Goal: Optimal Cerebral Tissue Perfusion  Outcome: Progressing  Goal: Optimal Cognitive Function  Outcome: Progressing  Goal: Improved Communication Skills  Outcome: Progressing  Goal: Optimal Functional Ability  Outcome: Progressing  Goal: Improved Sensorimotor Function  Outcome: Progressing  Goal: Effective Urinary Elimination  Outcome: Progressing     Problem: Comorbidity Management  Goal: Maintenance of Behavioral Health Symptom Control  Outcome: Progressing  Goal: Blood Pressure in Desired Range  Outcome: Progressing

## 2025-02-12 NOTE — HOSPITAL COURSE
Pt was a transfer to [facility] from [facility] for mainly mgx of afib RVR that required esmolol drip s/p post stroke on 1/28 , echo showed EF of 28% , cardiology was consulted pt was weaned off the drip to po meds currently rate controlled on Eliquis , pt had PEG tube placed on 2/7, tolerating tube feeding well , family decided on DNR/DNI but refusing hospice , no changes of  new decline of mental s/pt the stroke, pt to be transferred back to [facility] for placement.

## 2025-02-12 NOTE — ASSESSMENT & PLAN NOTE
Patient's FSGs are controlled on current medication regimen.  Last A1c reviewed-   Lab Results   Component Value Date    HGBA1C 5.7 (H) 01/29/2025     Most recent fingerstick glucose reviewed-   Recent Labs   Lab 02/11/25  1818 02/12/25  0112 02/12/25  0608   POCTGLUCOSE 133* 180* 170*       Current correctional scale  Low  Maintain anti-hyperglycemic dose as follows-   Antihyperglycemics (From admission, onward)    Start     Stop Route Frequency Ordered    02/03/25 1413  insulin aspart U-100 pen 0-5 Units         -- SubQ Every 6 hours PRN 02/03/25 1313        Hold Oral hypoglycemics while patient is in the hospital.

## 2025-02-12 NOTE — ASSESSMENT & PLAN NOTE
Presented on January 28, 2025 with large stroke, patient was not a candidate for tPA versus thrombectomy  Tele stroke was consulted.  The all anticoagulants to be started after in 7 days on February 4th  Neurology consulted at [facility] after arrival on 02/03       Antithrombotics for secondary stroke prevention: Antiplatelets: None:  Large size stroke on 01/28 patient to wait total of 7 days to restart anticoagulants on 2/for    Statins for secondary stroke prevention and hyperlipidemia, if present:   Statins: Atorvastatin- 40 mg daily    Aggressive risk factor modification: HTN, DM, A-Fib, CAD     Rehab efforts: The patient has been evaluated by a stroke team provider and the therapy needs have been fully considered based off the presenting complaints and exam findings. The following therapy evaluations are needed: PT evaluate and treat, OT evaluate and treat, SLP evaluate and treat    Diagnostics ordered/pending: Carotid ultrasound to assess vasculature, CT scan of head without contrast to asses brain parenchyma, CTA Head to assess vasculature , HgbA1C to assess blood glucose levels, Lipid Profile to assess cholesterol levels, MRA head to assess vasculature, MRA neck/arch to assess vasculature, MRI head without contrast to assess brain parenchyma, TTE to assess cardiac function/status , TSH to assess thyroid function    VTE prophylaxis: None: Reason for No Pharmacological VTE Prophylaxis:  Due to large stroke per Neurology recommendation had [facility] patient to be started after 7 days (2/4    BP parameters: Infarct:  Out of the window at this moment to maintain blood pressure within normal values

## 2025-02-12 NOTE — DISCHARGE SUMMARY
[facility] Medicine  Discharge Summary      Patient Name: Eneida Majano  MRN: [id number]  TRUMAN: [id number]  Patient Class: IP- Inpatient  Admission Date: 2/3/2025  Hospital Length of Stay: 9 days  Discharge Date and Time:  02/12/2025 11:32 AM  Attending Physician: Fozia Rush MD   Discharging Provider: Fozia Avila MD  Primary Care Provider: Paula Cooper NP    Primary Care Team: Networked reference to record PCT     HPI:   84-year-old female with a history of anemia, atrial fibrillation/flutter (on Eliquis), rheumatoid arthritis, diabetes, gastroesophageal reflux disease, hypertension, hyperlipidemia, and insomnia , pt was transferred to [facility] on January 28 with aphasia/dysarthria, right lateral gaze, and left-sided weakness. CT head showed evidence of a right MCA distribution stroke along with other areas of infarct. CTA of the head and neck showed occlusion of the right M2 branch. She had Vascular Neurology tele-consultation and was felt not to be a thrombolytic candidate. She was also not a thrombectomy candidate with a large core infarct on imaging. MRI brain on 1/29 also showed the areas of infarct. She was admitted with right MCA stroke, atrial flutter/fibrillation with rapid ventricular response, and encephalopathy. Anticoagulation was held due to concern for potential hemorrhagic conversion. Also troponin was elevated and she had echocardiogram that showed EF 28% with decreased right ventricular systolic function and moderate pulmonary hypertension. Mentation improved somewhat by January 31. She was placed on Plavix with plans to resume anticoagulation after 7 days. By February 1, she had increased confusion. She was more tachycardic. On the morning of February 3, tachycardia persisted and she was upgraded in status and started on esmolol infusion. She was seen by Cardiology. On the morning of February 3 she was awake but confused and not following  commands. She was unable to swallow oral medicines. Blood pressure remained stable. Pt was transfer to Hospital Medicine at [facility] in ICU status for continued rate control with esmolol.       On day of transfer to [facility] :  February 3: Sodium 138, potassium 4.1, chloride 108, CO2 19, BUN 13, creatinine 0.8, glucose 125, magnesium 1.9, white blood cells 7.04, hemoglobin 12.8, hematocrit 39.8, platelets 256  -EKG showed atrial fibrillation with ventricular rate 123. T-wave abnormality.    January 31: AST 23, ALT 20  -abdominal ultrasound had no acute findings    January 30: CT head showed redemonstration of acute right MCA infarct. No acute intracranial hemorrhage.  -INR 1.1  -echocardiogram had EF 28%. Unable to assess diastolic function due to atrial fibrillation. Decreased right ventricular systolic function. Right atrium is dilated. Moderate mitral regurgitation. Moderate tricuspid regurgitation. Moderate pulmonary hypertension with estimated PA systolic pressure 60 mmHg. Intermediate venous pressure at mmHg.    January 29: MRI brain had areas of acute infarction in the right MCA distribution largest involving the right perisylvian and lateral right frontal regions and smaller lateral right parieto-occipital cortical infarct with associated localized mass effect including effacement of adjacent portions of the right sylvian fissure and overlying cortical sulci. Non acute posterior left frontal subcortical white matter infarct. Moderate presumed microvascular ischemic change     Procedure(s) (LRB):  EGD, WITH PEG TUBE INSERTION (N/A)      Hospital Course:   Pt was a transfer to [facility] from [facility] for mainly mgx of afib RVR that required esmolol drip s/p post stroke on 1/28 , echo showed EF of 28% , cardiology was consulted pt was weaned off the drip to po meds currently rate controlled on Eliquis , pt had PEG tube placed on 2/7, tolerating tube feeding well , family decided on  DNR/DNI but refusing hospice , no changes of  new decline of mental s/pt the stroke, pt to be transferred back to [facility] for placement.     Goals of Care Treatment Preferences:  Code Status: DNR      SDOH Screening:  The patient was unable to be screened for utility difficulties, food insecurity, transport difficulties, housing insecurity, and interpersonal safety, so no concerns could be identified this admission.     Consults:   Consults (From admission, onward)          Status Ordering Provider     Inpatient consult to Palliative Care  Once        Provider:  Olamide Wheeler MD    Acknowledged AL ALLAWI, LEPE     Inpatient consult to Social Work  Once        Provider:  (Not yet assigned)    Completed INNOCENT-ITUAH, RENETTA N.     Inpatient consult to Registered Dietitian/Nutritionist  Once        Provider:  (Not yet assigned)    Completed INNOCENT-ITUAH, RENETTA N.     Inpatient consult to G[facility]enterology-[facility]  Once        Provider:  (Not yet assigned)    Completed INNOCENT-ITUAH, RENETTA N.     Inpatient consult to Midline team  Once        Provider:  (Not yet assigned)    Acknowledged MIGUE NY     Inpatient consult to Pulmonology  Once        Provider:  Amilcar Flores MD    Completed AL ALLAWI, LEPE     Inpatient consult to Social Work  Once        Provider:  (Not yet assigned)    Completed PARAM LENNON A.     Inpatient consult to Cardiology  Once        Provider:  Luca Alejo NP    Completed LENNON, MARLON.     Inpatient consult to Cardiology  Once        Provider:  Brenda Roth MD    Completed PARAM LENNON A.     Inpatient Consult to [facility] (General Neurology)  Once        Provider:  Andre Batista MD    Completed AL ALLAWI, LEPE     Inpatient consult to Registered Dietitian/Nutritionist  Once        Provider:  (Not yet assigned)    Completed AL ALLAWI, LEPE     Inpatient consult to [facility]  Once        Provider:  Gonzalez  Brenda PATINO MD    Completed SHERLEY SOOD            * Cerebrovascular accident (CVA) due to embolism of right middle cerebral artery  Presented on January 28, 2025 with large stroke, patient was not a candidate for tPA versus thrombectomy  Tele stroke was consulted.  The all anticoagulants to be started after in 7 days on February 4th  Neurology consulted at \[facility]\"" after arrival on 02/03       Antithrombotics for secondary stroke prevention: Antiplatelets: None:  Large size stroke on 01/28 patient to wait total of 7 days to restart anticoagulants on 2/for    Statins for secondary stroke prevention and hyperlipidemia, if present:   Statins: Atorvastatin- 40 mg daily    Aggressive risk factor modification: HTN, DM, A-Fib, CAD     Rehab efforts: The patient has been evaluated by a stroke team provider and the therapy needs have been fully considered based off the presenting complaints and exam findings. The following therapy evaluations are needed: PT evaluate and treat, OT evaluate and treat, SLP evaluate and treat    Diagnostics ordered/pending: Carotid ultrasound to assess vasculature, CT scan of head without contrast to asses brain parenchyma, CTA Head to assess vasculature , HgbA1C to assess blood glucose levels, Lipid Profile to assess cholesterol levels, MRA head to assess vasculature, MRA neck/arch to assess vasculature, MRI head without contrast to assess brain parenchyma, TTE to assess cardiac function/status , TSH to assess thyroid function    MRA brain on 01/29  Areas of acute infarction right MCA distribution largest involving the right perisylvian and lateral right frontal regions measuring 4.9 x 2.9 cm and smaller lateral right parietooccipital cortical infarct measuring 2.6 x 2.1 cm with associated localized mass effect including effacement of adjacent portions right sylvian fissure and overlying cortical sulci.     Nonacute posterior left frontal subcortical white matter infarct with small  focus overlying cortical involvement.  Moderate presumed microvascular ischemic change white matter.    CT head on 02/03  Evolving right MCA vascular territory infarcts    MRI brain on 2/4  Stable bilateral acute/subacute supratentorial infarcts without significant extension or hemorrhagic conversion.     Background of prominent periventricular white matter changes of chronic microvascular ischemia.    EEG    This is an abnormal EEG during stupor state.  The overall degree of disorganization and slowing for given age is suggestive of a moderate to severe encephalopathy, likely a toxic metabolic encephalopathy.  There is no evidence of an epileptic process on this recording.  No seizures were recorded during this study.   VTE prophylaxis: None: Reason for No Pharmacological VTE Prophylaxis:  Due to large stroke per Neurology recommendation had [facility] patient to be started after 7 days (2/4    BP parameters: Infarct:  Out of the window at this moment to maintain blood pressure within normal values      Awaiting placement and transfer back to [facility],   denied by LTAC .    Hypophosphatemia  Patient's most recent phosphorus results are listed below.   Recent Labs     02/11/25  0438 02/12/25  0458 02/12/25  0459   PHOS 1.6* 1.8* 1.9*       Plan  - Will treat hypophosphatemia with replacement protocol  - Patient's hypophosphatemia is  monitored       UTI (urinary tract infection)  UA positive  Urine cx-   Urine culture with multiple organism non predominance-deescalate soon    Ceftriaxone deescalate soon      ACP (advance care planning)  Advance Care Planning    Date: 02/04/2025    Code Status  Code status unknown. we agreed to leave patient as full code until code status is confirmed  by family./     A total of 20 min was spent on advance care planning, goals of care discussion, emotional support, formulating and communicating prognosis and exploring burden/benefit of various approaches of treatment. This  discussion occurred on a fully voluntary basis with the verbal consent of the patient and/or family.     Advance Care Planning    Date: 02/05/2025  Primary team and pulmonary crit met with patient family at the bedside, updated on clinical status and imaging reports. Also discussed possible disease trajectory, family open to supportive treatment for now to include peg tube placement and possible LTAC placement. Code status also discussed and family wants to continue present treament regimen but make patient DNR in the event she decline clinically.  Questions and concerns were addressed          Code Status  In light of the patients advanced and life limiting illness,I engaged the the family in a voluntary conversation about the patient's preferences for care  at the very end of life. The patient wishes to have a natural, peaceful death.  Along those lines, the patient does not wish to have CPR or other invasive treatments performed when her heart and/or breathing stops. I communicated to the family that a DNR order would be placed in her medical record to reflect this preference.    A total of 15 min was spent on advance care planning, goals of care discussion, emotional support, formulating and communicating prognosis and exploring burden/benefit of various approaches of treatment. This discussion occurred on a fully voluntary basis with the verbal consent of the patient and/or family.           Acute on chronic heart failure  Cardiology was consulted currently patient is on esmolol drip.  Beta blockers scheduled  Patient is a euvolemic  Patient received loading dose of digoxin for AFib with RVR uncontrolled  Echo Saline Bubble? Yes; Ultrasound enhancing contrast? Yes    Result Date: 1/30/2025    Left Ventricle: The left ventricle is normal in size. Mildly increased   wall thickness. Global hypokinesis present. There is severely reduced   systolic function. Quantitated ejection fraction is 28%. Unable to assess    diastolic function due to atrial fibrillation.    Right Ventricle: Right ventricular enlargement. Systolic function is   reduced.    Left Atrium: Left atrium is severely dilated. Agitated saline study of   the atrial septum is negative after vasalva maneuver, suggesting absence   of intracardiac shunt at the atrial level.    Right Atrium: Right atrium is dilated.    Mitral Valve: There is moderate regurgitation.  EROA 0.31 cm2    Tricuspid Valve: There is moderate regurgitation.    Pulmonary Artery: There is moderate pulmonary hypertension. The   estimated pulmonary artery systolic pressure is 60 mmHg.    IVC/SVC: Intermediate venous pressure at 8 mmHg.         Acute encephalopathy  2/3 at [facility] sent for stat CT head  -TSH T4 ammonia levels  -a neurology consulted-appreciate recs    Repeat MRI brain  unchanged   EEG   This is an abnormal EEG during stupor state.  The overall degree of disorganization and slowing for given age is suggestive of a moderate to severe encephalopathy, likely a toxic metabolic encephalopathy.  There is no evidence of an epileptic process on this recording.  No seizures were recorded during this study.   Consult GI for PEG tube placement- planned for 2/7    Stroke due to occlusion of right middle cerebral artery  Presented on January 28, 2025 with large stroke, patient was not a candidate for tPA versus thrombectomy  Tele stroke was consulted.  The all anticoagulants to be started after in 7 days on February 4th  Neurology consulted at [facility] after arrival on 02/03       Antithrombotics for secondary stroke prevention: Antiplatelets: None:  Large size stroke on 01/28 patient to wait total of 7 days to restart anticoagulants on 2/for    Statins for secondary stroke prevention and hyperlipidemia, if present:   Statins: Atorvastatin- 40 mg daily    Aggressive risk factor modification: HTN, DM, A-Fib, CAD     Rehab efforts: The patient has been evaluated by a stroke team  provider and the therapy needs have been fully considered based off the presenting complaints and exam findings. The following therapy evaluations are needed: PT evaluate and treat, OT evaluate and treat, SLP evaluate and treat    Diagnostics ordered/pending: Carotid ultrasound to assess vasculature, CT scan of head without contrast to asses brain parenchyma, CTA Head to assess vasculature , HgbA1C to assess blood glucose levels, Lipid Profile to assess cholesterol levels, MRA head to assess vasculature, MRA neck/arch to assess vasculature, MRI head without contrast to assess brain parenchyma, TTE to assess cardiac function/status , TSH to assess thyroid function    VTE prophylaxis: None: Reason for No Pharmacological VTE Prophylaxis:  Due to large stroke per Neurology recommendation had [facility] patient to be started after 7 days (2/4    BP parameters: Infarct:  Out of the window at this moment to maintain blood pressure within normal values        Dysphagia  S/p stroke   Consult SLP-appreciate recs   Consult GI for PEG tube placement   S/p peg tube placed on 2/7  Resume tube feed      Atrial fibrillation with RVR  Patient has paroxysmal (<7 days) atrial fibrillation. Patient is currently in atrial fibrillation. [id number] Score: 5. The patients heart rate in the last 24 hours is as follows:  Pulse  Min: 49  Max: 114     Antiarrhythmics  metoprolol injection 5 mg, Every 6 hours PRN, Intravenous  metoprolol injection 10 mg, Once, Intravenous  metoprolol tartrate tablet 75 mg, 4 times daily, Per G Tube  diltiaZEM tablet 60 mg, Every 6 hours, Per G Tube    Anticoagulants  apixaban tablet 2.5 mg, 2 times daily, Per G Tube    Plan  - Replete lytes with a goal of K>4, Mg >2  - Patient is not anticoagulated due to recent history of massive stroke, after clearance from Neurology can consider starting patient on anticoagulated on 2/for  - Patient's afib is currently uncontrolled. Will adjust treatment as  follows:  Cardiology consulted currently on esmolol drip, digoxin level than load with digoxin    Wean esmolol drip to metoprolol      Essential hypertension  Patient's blood pressure range in the last 24 hours was: BP  Min: 127/69  Max: 162/91.The patient's inpatient anti-hypertensive regimen is listed below:  Current Antihypertensives  metoprolol injection 5 mg, Every 6 hours PRN, Intravenous  metoprolol injection 10 mg, Once, Intravenous  metoprolol tartrate tablet 75 mg, 4 times daily, Per G Tube  furosemide tablet 20 mg, Daily, Per G Tube  diltiaZEM tablet 60 mg, Every 6 hours, Per G Tube  lisinopriL tablet 40 mg, Daily, Per G Tube    Plan  - BP is uncontrolled, changes made to the regimen  - controlled current medications are to control AFib RVR    Gastroesophageal reflux disease  On famotidine       T2DM (type 2 diabetes mellitus)  Patient's FSGs are controlled on current medication regimen.  Last A1c reviewed-   Lab Results   Component Value Date    HGBA1C 5.7 (H) 01/29/2025     Most recent fingerstick glucose reviewed-   Recent Labs   Lab 02/11/25  1818 02/12/25  0112 02/12/25  0608   POCTGLUCOSE 133* 180* 170*       Current correctional scale  Low  Maintain anti-hyperglycemic dose as follows-   Antihyperglycemics (From admission, onward)      Start     Stop Route Frequency Ordered    02/03/25 1413  insulin aspart U-100 pen 0-5 Units         -- SubQ Every 6 hours PRN 02/03/25 1313          Hold Oral hypoglycemics while patient is in the hospital.      Final Active Diagnoses:    Diagnosis Date Noted POA    PRINCIPAL PROBLEM:  Cerebrovascular accident (CVA) due to embolism of right middle cerebral artery [I63.411] 01/29/2025 Yes    Hypophosphatemia [E83.39] 02/11/2025 Yes    UTI (urinary tract infection) [N39.0] 02/06/2025 Yes    ACP (advance care planning) [Z71.89] 02/04/2025 Not Applicable    Acute on chronic heart failure [I50.9] 02/01/2025 Yes    Acute encephalopathy [G93.40] 01/29/2025 Yes    Stroke due  to occlusion of right middle cerebral artery [I63.511] 01/28/2025 Yes    Dysphagia [R13.10] 06/16/2020 Yes    Atrial fibrillation with RVR [I48.91] 03/08/2019 Yes    Essential hypertension [I10] 01/12/2016 Yes    Gastroesophageal reflux disease [K21.9] 12/29/2015 Yes    T2DM (type 2 diabetes mellitus) [E11.9] 07/23/2014 Yes      Problems Resolved During this Admission:       Discharged Condition: poor    Disposition: TRANSFER TO [facility] TO [facility]    Follow Up:    Patient Instructions:   No discharge procedures on file.    Significant Diagnostic Studies: Labs: All labs within the past 24 hours have been reviewed    Pending Diagnostic Studies:       Procedure Component Value Units Date/Time    Specimen to Pathology, Surgery Gastrointestinal tract [[id number]] Collected: 02/07/25 1426    Order Status: Sent Lab Status: In process Updated: 02/10/25 0904    Specimen: Tissue            Medications:  Transfer Medications (for Discharge Readmit only):   Current Facility-Administered Medications   Medication Dose Route Frequency Provider Last Rate Last Admin    acetaminophen oral solution 650 mg  650 mg Per G Tube Q6H PRN Andre Tucker NP   650 mg at 02/08/25 2202    apixaban tablet 2.5 mg  2.5 mg Per G Tube BID Chhaya Vilchis MD   2.5 mg at 02/12/25 0956    aspirin chewable tablet 81 mg  81 mg Per G Tube Daily [facility]   81 mg at 02/12/25 0956    atorvastatin tablet 80 mg  80 mg Per G Tube Daily Chhaya Vilchis MD   80 mg at 02/12/25 0956    bisacodyL suppository 10 mg  10 mg Rectal Daily PRN Marco Fiore MD        calcium gluconate 1 g in NS IVPB (premixed)  1 g Intravenous PRN Fozia Rush MD        calcium gluconate 1 g in NS IVPB (premixed)  2 g Intravenous PRN Fozia Rush MD        calcium gluconate 1 g in NS IVPB (premixed)  3 g Intravenous PRN Fozia Rush MD        dextrose 50% injection 12.5 g  12.5 g Intravenous PRN Fozia Rush MD   12.5  g at 02/08/25 1328    digoxin tablet 0.125 mg  0.125 mg Per G Tube Daily Chhaya Vilchis MD   0.125 mg at 02/12/25 0956    diltiaZEM tablet 60 mg  60 mg Per G Tube Q6H Fozia Rush MD   60 mg at 02/12/25 0535    [START ON 2/13/2025] famotidine tablet 20 mg  20 mg Per G Tube Daily Fozia Rush MD        furosemide tablet 20 mg  20 mg Per G Tube Daily Chhaya Vilchis MD   20 mg at 02/12/25 0956    glucagon (human recombinant) injection 1 mg  1 mg Intramuscular PRN Fozia Rush MD        haloperidol lactate injection 1 mg  1 mg Intravenous Q6H PRN Marco Fiore MD   1 mg at 02/10/25 0308    hydroxychloroquine tablet 200 mg  200 mg Per G Tube BID Chhaya Vilchis MD   200 mg at 02/12/25 0956    hydrOXYzine pamoate capsule 25 mg  25 mg Per G Tube Q6H PRN Chhaya Vilchis MD        insulin aspart U-100 pen 0-5 Units  0-5 Units Subcutaneous Q6H PRN Fozia Rush MD        lisinopriL tablet 40 mg  40 mg Per G Tube Daily Fozia Rush MD   40 mg at 02/12/25 0955    magnesium sulfate 2g in water 50mL IVPB (premix)  2 g Intravenous PRN Fozia Rush MD        magnesium sulfate 2g in water 50mL IVPB (premix)  2 g Intravenous PRN Fozia Rush MD        metoprolol injection 10 mg  10 mg Intravenous Once Chhaya Vilchis MD        metoprolol injection 5 mg  5 mg Intravenous Q6H PRN Caitlin Anton MD   5 mg at 02/08/25 0604    metoprolol tartrate tablet 75 mg  75 mg Per G Tube QID Chhaya Vilchis MD   75 mg at 02/12/25 0956    ondansetron injection 4 mg  4 mg Intravenous Q8H PRN Fozia Rush MD        scopolamine 1.3-1.5 mg (1 mg over 3 days) 1 patch  1 patch Transdermal Q3 Days Zion Tijerina MD   1 patch at 02/12/25 0248    senna-docusate 8.6-50 mg per tablet 1 tablet  1 tablet Per G Tube BID Chhaya Vilchis MD   1 tablet at 02/12/25 0956    sodium chloride 0.9% flush 10 mL  10 mL Intravenous PRN Fozia Rush,  MD        sodium chloride 0.9% flush 10 mL  10 mL Intravenous Q12H PRN Jaden Merritt MD        sodium phosphate 15 mmol in D5W 250 mL IVPB  15 mmol Intravenous PRN Fozia Rush MD        sodium phosphate 20.01 mmol in D5W 250 mL IVPB  20.01 mmol Intravenous PRN Fozia Rush MD        sodium phosphate 30 mmol in D5W 250 mL IVPB  30 mmol Intravenous PRN Fozia Rush MD           Indwelling Lines/Drains at time of discharge:   Lines/Drains/Airways       Drain  Duration                  Gastrostomy/Enterostomy 02/07/25 LUQ feeding 5 days    Female External Urinary Catheter w/ Suction 02/06/25 7784 5 days                    Time spent on the discharge of patient: 45 minutes         Fozia Avila MD  Department of [facility] Medicine  [facility]

## 2025-02-12 NOTE — ASSESSMENT & PLAN NOTE
2/3 at [facility] sent for stat CT head  -TSH T4 ammonia levels  -a neurology consulted-appreciate recs    Repeat MRI brain  unchanged   EEG   This is an abnormal EEG during stupor state.  The overall degree of disorganization and slowing for given age is suggestive of a moderate to severe encephalopathy, likely a toxic metabolic encephalopathy.  There is no evidence of an epileptic process on this recording.  No seizures were recorded during this study.   Consult GI for PEG tube placement- planned for 2/7

## 2025-02-12 NOTE — ASSESSMENT & PLAN NOTE
Patient's most recent phosphorus results are listed below.   Recent Labs     02/11/25  0438 02/12/25  0458 02/12/25  0459   PHOS 1.6* 1.8* 1.9*       Plan  - Will treat hypophosphatemia with replacement protocol  - Patient's hypophosphatemia is  monitored

## 2025-02-12 NOTE — PLAN OF CARE
PAYTON spoke with the MD and pt's drt Adelina [phone], the pt's plan is to dc back to [facility] later today. MD already has set up transfer with the [facility]. Adelina understands that pt will dc back to [facility] once a bed is available. Rounds completed on pt. All questions addressed. Bedside nurse to discuss d/c medications. Discussed importance to attend all f/u appts and take medications as prescribed. Verbalized understanding. Case Management to sign off.     Christian Christopher, MSW  [phone]    Future Appointments   Date Time Provider Department Center   2/18/2025 11:00 AM Paula Cooper NP [facility] MED [place] [facility]   2/20/2025 11:15 AM OPHTHALMOLOGY TESTING, [facility] O[facility]THAL [facility]   2/20/2025 12:30 PM JOANNE Kimbrough IV, MD [facility] O[facility]AL [facility]   4/3/2025  7:50 AM [facility]   4/10/2025  9:00 AM Paula Cooper NP [facility] MED [place] [facility]   6/16/2025  9:00 AM Pepe Izaguirre MD [facility] DERM [facility]        02/12/25 1239   Final Note   Assessment Type Final Discharge Note   What phone number can be called within the next 1-3 days to see how you are doing after discharge? [phone]   Post-Acute Status   Post-Acute Placement Status Set-up Complete/Auth obtained   Coverage phn   Discharge Delays None known at this time

## 2025-02-12 NOTE — PLAN OF CARE
LTAC denied by PHN.       02/12/25 1356   Post-Acute Status   Post-Acute Authorization Placement       Ender Hernandez, RN   Supervisor Case Management-[facility]  [phone]

## 2025-02-12 NOTE — NURSING
Pt had BM which got onto mitten restraints. Mittens removed to apply new ones and pt became agitated and started to swing, bite and scratch. Pt then scratched herself causing skin tear to R wrist, foam dressing applied. New mittens reapplied per order.  PRN haldol to be given per mar. Vitals stable.

## 2025-02-12 NOTE — ASSESSMENT & PLAN NOTE
Patient has paroxysmal (<7 days) atrial fibrillation. Patient is currently in atrial fibrillation. [id number] Score: 5. The patients heart rate in the last 24 hours is as follows:  Pulse  Min: 49  Max: 114     Antiarrhythmics  metoprolol injection 5 mg, Every 6 hours PRN, Intravenous  metoprolol injection 10 mg, Once, Intravenous  metoprolol tartrate tablet 75 mg, 4 times daily, Per G Tube  diltiaZEM tablet 60 mg, Every 6 hours, Per G Tube    Anticoagulants  apixaban tablet 2.5 mg, 2 times daily, Per G Tube    Plan  - Replete lytes with a goal of K>4, Mg >2  - Patient is not anticoagulated due to recent history of massive stroke, after clearance from Neurology can consider starting patient on anticoagulated on 2/for  - Patient's afib is currently uncontrolled. Will adjust treatment as follows:  Cardiology consulted currently on esmolol drip, digoxin level than load with digoxin    Wean esmolol drip to metoprolol

## 2025-02-16 ENCOUNTER — RESULTS FOLLOW-UP (OUTPATIENT)
Dept: GASTROENTEROLOGY | Facility: HOSPITAL | Age: 85
End: 2025-02-16
Payer: MEDICARE

## 2025-02-16 PROBLEM — I82.A11 ACUTE DEEP VEIN THROMBOSIS (DVT) OF AXILLARY VEIN OF RIGHT UPPER EXTREMITY: Status: ACTIVE | Noted: 2025-02-16

## 2025-02-17 ENCOUNTER — EXTERNAL HOME HEALTH (OUTPATIENT)
Dept: HOME HEALTH SERVICES | Facility: HOSPITAL | Age: 85
End: 2025-02-17
Payer: MEDICARE

## 2025-02-17 ENCOUNTER — TELEPHONE (OUTPATIENT)
Dept: GASTROENTEROLOGY | Facility: CLINIC | Age: 85
End: 2025-02-17
Payer: MEDICARE

## 2025-02-17 NOTE — TELEPHONE ENCOUNTER
Spoke with patient daughter to discuss results. V/U    ----- Message from Sean sent at 2/17/2025  8:48 AM CST -----  .Type:  Needs Medical AdviceWho Called: pt daughter Farhadould the patient rather a call back or a response via [facility]? Call [facility] Call Back Number: [phone] Information: Adelina stated she missed a call and would like a call back please

## 2025-02-18 PROBLEM — I48.91 ATRIAL FIBRILLATION WITH RVR: Status: RESOLVED | Noted: 2019-03-08 | Resolved: 2025-02-18
